# Patient Record
Sex: FEMALE | Race: BLACK OR AFRICAN AMERICAN | Employment: FULL TIME | URBAN - METROPOLITAN AREA
[De-identification: names, ages, dates, MRNs, and addresses within clinical notes are randomized per-mention and may not be internally consistent; named-entity substitution may affect disease eponyms.]

---

## 2018-01-23 ENCOUNTER — OFFICE VISIT (OUTPATIENT)
Dept: FAMILY MEDICINE CLINIC | Age: 33
End: 2018-01-23

## 2018-01-23 VITALS
OXYGEN SATURATION: 99 % | TEMPERATURE: 97.2 F | DIASTOLIC BLOOD PRESSURE: 82 MMHG | BODY MASS INDEX: 32.28 KG/M2 | RESPIRATION RATE: 14 BRPM | HEART RATE: 66 BPM | WEIGHT: 213 LBS | SYSTOLIC BLOOD PRESSURE: 128 MMHG | HEIGHT: 68 IN

## 2018-01-23 DIAGNOSIS — Z00.00 WELL WOMAN EXAM (NO GYNECOLOGICAL EXAM): ICD-10-CM

## 2018-01-23 DIAGNOSIS — Z23 ENCOUNTER FOR IMMUNIZATION: ICD-10-CM

## 2018-01-23 DIAGNOSIS — Z00.00 ROUTINE GENERAL MEDICAL EXAMINATION AT A HEALTH CARE FACILITY: Primary | ICD-10-CM

## 2018-01-23 DIAGNOSIS — E66.9 OBESITY (BMI 30.0-34.9): ICD-10-CM

## 2018-01-23 DIAGNOSIS — R53.82 CHRONIC FATIGUE: ICD-10-CM

## 2018-01-23 PROBLEM — H53.129 CONCENTRIC FADING: Status: ACTIVE | Noted: 2018-01-23

## 2018-01-23 RX ORDER — PHENTERMINE HYDROCHLORIDE 37.5 MG/1
37.5 TABLET ORAL
Qty: 30 TAB | Refills: 0 | Status: SHIPPED | OUTPATIENT
Start: 2018-01-23 | End: 2018-04-11 | Stop reason: SDUPTHER

## 2018-01-23 NOTE — MR AVS SNAPSHOT
[address] [facility] 7 [id number] [phone] Patient: Ruben Abraham MRN: [id number] OND:[id number] Visit Information Date & Time Provider Department Dept. Phone Encounter #  
 1/23/2018  9:00 AM Michelle Buck MD [address] [place] [address] OFFICE-ANNEX [phone] [id number] Follow-up Instructions Return in about 4 weeks (around 2/20/2018), or if symptoms worsen or fail to improve. Upcoming Health Maintenance Date Due  
 PAP AKA CERVICAL CYTOLOGY 4/24/2018 DTaP/Tdap/Td series (2 - Td) 6/2/2026 Allergies as of 1/23/2018  Review Complete On: 1/23/2018 By: Lisa Hernandez LPN No Known Allergies Current Immunizations  Reviewed on 10/20/2016 Name Date Influenza Vaccine (Quad) PF  Incomplete Influenza Vaccine PF 10/9/2013 Pneumococcal Polysaccharide (PPSV-23) 6/2/2016 Tdap 6/2/2016,  Deferred (Patient Refused) Not reviewed this visit You Were Diagnosed With   
  
 Codes Comments Routine general medical examination at a health care facility    -  Primary ICD-10-CM: Z00.00 ICD-9-CM: V70.0 Well woman exam (no gynecological exam)     ICD-10-CM: Z00.00 ICD-9-CM: V70.0 [V70.0] Encounter for immunization     ICD-10-CM: F97 ICD-9-CM: V03.89 Obesity (BMI 30.0-34.9)     ICD-10-CM: Y65.3 ICD-9-CM: 278.00 Chronic fatigue     ICD-10-CM: R53.82 
ICD-9-CM: 780.79 Vitals BP Pulse Temp Resp Height(growth percentile) Weight(growth percentile) 128/82 (BP 1 Location: Left arm, BP Patient Position: At rest) 66 97.2 °F (36.2 °C) (Oral) 14 5' 8\" (1.727 m) 213 lb (96.6 kg) SpO2 BMI OB Status Smoking Status 99% 32.39 kg/m2 Injection Never Smoker BMI and BSA Data Body Mass Index Body Surface Area  
 32.39 kg/m 2 2.15 m 2 Preferred Pharmacy Pharmacy Name Phone [id number] [place], [place] - [address] [facility] Cristina Valverde [phone] Your Updated Medication List  
  
   
This list is accurate as of: 1/23/18 10:34 AM.  Always use your most recent med list.  
  
  
  
  
 phentermine 37.5 mg tablet Commonly known as:  ADIPEX-P Take 1 Tab by mouth every morning. Max Daily Amount: 37.5 mg.  
  
  
  
  
Prescriptions Printed Refills  
 phentermine (ADIPEX-P) 37.5 mg tablet 0 Sig: Take 1 Tab by mouth every morning. Max Daily Amount: 37.5 mg.  
 Class: Print Route: Oral  
  
We Performed the Following CBC W/O DIFF [60009 CPT(R)] INFLUENZA VIRUS VAC QUAD,SPLIT,PRESV FREE SYRINGE IM [id number] CPT(R)] LIPID PANEL [23973 CPT(R)] METABOLIC PANEL, COMPREHENSIVE [79953 CPT(R)] TSH 3RD GENERATION [89984 CPT(R)] Follow-up Instructions Return in about 4 weeks (around 2/20/2018), or if symptoms worsen or fail to improve. Patient Instructions Well Visit, Ages 25 to 48: Care Instructions Your Care Instructions Physical exams can help you stay healthy. Your doctor has checked your overall health and may have suggested ways to take good care of yourself. He or she also may have recommended tests. At home, you can help prevent illness with healthy eating, regular exercise, and other steps. Follow-up care is a key part of your treatment and safety. Be sure to make and go to all appointments, and call your doctor if you are having problems. It's also a good idea to know your test results and keep a list of the medicines you take. How can you care for yourself at home? · Reach and stay at a healthy weight. This will lower your risk for many problems, such as obesity, diabetes, heart disease, and high blood pressure. · Get at least 30 minutes of physical activity on most days of the week. Walking is a good choice. You also may want to do other activities, such as running, swimming, cycling, or playing tennis or team sports.  Discuss any changes in your exercise program with your doctor. · Do not smoke or allow others to smoke around you. If you need help quitting, talk to your doctor about stop-smoking programs and medicines. These can increase your chances of quitting for good. · Talk to your doctor about whether you have any risk factors for sexually transmitted infections (STIs). Having one sex partner (who does not have STIs and does not have sex with anyone else) is a good way to avoid these infections. · Use birth control if you do not want to have children at this time. Talk with your doctor about the choices available and what might be best for you. · Protect your skin from too much sun. When you're outdoors from 10 a.m. to 4 p.m., stay in the shade or cover up with clothing and a hat with a wide brim. Wear sunglasses that block UV rays. Even when it's cloudy, put broad-spectrum sunscreen (SPF 30 or higher) on any exposed skin. · See a dentist one or two times a year for checkups and to have your teeth cleaned. · Wear a seat belt in the car. · Drink alcohol in moderation, if at all. That means no more than 2 drinks a day for men and 1 drink a day for women. Follow your doctor's advice about when to have certain tests. These tests can spot problems early. For everyone · Cholesterol. Have the fat (cholesterol) in your blood tested after age 21. Your doctor will tell you how often to have this done based on your age, family history, or other things that can increase your risk for heart disease. · Blood pressure. Have your blood pressure checked during a routine doctor visit. Your doctor will tell you how often to check your blood pressure based on your age, your blood pressure results, and other factors. · Vision. Talk with your doctor about how often to have a glaucoma test. 
· Diabetes. Ask your doctor whether you should have tests for diabetes. · Colon cancer. Have a test for colon cancer at age 48.  You may have one of several tests. If you are younger than 48, you may need a test earlier if you have any risk factors. Risk factors include whether you already had a precancerous polyp removed from your colon or whether your parent, brother, sister, or child has had colon cancer. For women · Breast exam and mammogram. Talk to your doctor about when you should have a clinical breast exam and a mammogram. Medical experts differ on whether and how often women under 50 should have these tests. Your doctor can help you decide what is right for you. · Pap test and pelvic exam. Begin Pap tests at age 24. A Pap test is the best way to find cervical cancer. The test often is part of a pelvic exam. Ask how often to have this test. 
· Tests for sexually transmitted infections (STIs). Ask whether you should have tests for STIs. You may be at risk if you have sex with more than one person, especially if your partners do not wear condoms. For men · Tests for sexually transmitted infections (STIs). Ask whether you should have tests for STIs. You may be at risk if you have sex with more than one person, especially if you do not wear a condom. · Testicular cancer exam. Ask your doctor whether you should check your testicles regularly. · Prostate exam. Talk to your doctor about whether you should have a blood test (called a PSA test) for prostate cancer. Experts differ on whether and when men should have this test. Some experts suggest it if you are older than 39 and are -American or have a father or brother who got prostate cancer when he was younger than 72. When should you call for help? Watch closely for changes in your health, and be sure to contact your doctor if you have any problems or symptoms that concern you. Where can you learn more? Go to http://caleb-bassam.info/. Enter P072 in the search box to learn more about \"Well Visit, Ages 25 to 48: Care Instructions. \" Current as of: May 12, 2017 Content Version: 11.4 © 2695-0224 [employer]. Care instructions adapted under license by [employer] (which disclaims liability or warranty for this information). If you have questions about a medical condition or this instruction, always ask your healthcare professional. [address] any warranty or liability for your use of this information. Learning About Low-Carbohydrate Diets for Weight Loss What is a low-carbohydrate diet? Low-carb diets avoid foods that are high in carbohydrate. These high-carb foods include pasta, bread, rice, cereal, fruits, and starchy vegetables. Instead, these diets usually have you eat foods that are high in fat and protein. Many people lose weight quickly on a low-carb diet. But the early weight loss is water. People on this diet often gain the weight back after they start eating carbs again. Not all diet plans are safe or work well. A lot of the evidence shows that low-carb diets aren't healthy. That's because these diets often don't include healthy foods like fruits and vegetables. Losing weight safely means balancing protein, fat, and carbs with every meal and snack. And low-carb diets don't always provide the vitamins, minerals, and fiber you need. If you have a serious medical condition, talk to your doctor before you try any diet. These conditions include kidney disease, heart disease, type 2 diabetes, high cholesterol, and high blood pressure. If you are pregnant, it may not be safe for your baby if you are on a low-carb diet. How can you lose weight safely? You might have heard that a diet plan helped another person lose weight. But that doesn't mean that it will work for you. It is very hard to stay on a diet that includes lots of big changes in your eating habits. If you want to get to a healthy weight and stay there, making healthy lifestyle changes will often work better than dieting. These steps can help. · Make a plan for change. Work with your doctor to create a plan that is right for you. · See a dietitian. He or she can show you how to make healthy changes in your eating habits. · Manage stress. If you have a lot of stress in your life, it can be hard to focus on making healthy changes to your daily habits. · Track your food and activity. You are likely to do better at losing weight if you keep track of what you eat and what you do. Follow-up care is a key part of your treatment and safety. Be sure to make and go to all appointments, and call your doctor if you are having problems. It's also a good idea to know your test results and keep a list of the medicines you take. Where can you learn more? Go to http://caleb-bassam.info/. Enter A121 in the search box to learn more about \"Learning About Low-Carbohydrate Diets for Weight Loss. \" Current as of: May 12, 2017 Content Version: 11.4 © 2843-0901 KelBillet. Care instructions adapted under license by [employer]wolf (which disclaims liability or warranty for this information). If you have questions about a medical condition or this instruction, always ask your healthcare professional. [address] any warranty or liability for your use of this information. Body Mass Index: Care Instructions Your Care Instructions Body mass index (BMI) can help you see if your weight is raising your risk for health problems. It uses a formula to compare how much you weigh with how tall you are. · A BMI lower than 18.5 is considered underweight. · A BMI between 18.5 and 24.9 is considered healthy. · A BMI between 25 and 29.9 is considered overweight. A BMI of 30 or higher is considered obese. If your BMI is in the normal range, it means that you have a lower risk for weight-related health problems.  If your BMI is in the overweight or obese range, you may be at increased risk for weight-related health problems, such as high blood pressure, heart disease, stroke, arthritis or joint pain, and diabetes. If your BMI is in the underweight range, you may be at increased risk for health problems such as fatigue, lower protection (immunity) against illness, muscle loss, bone loss, hair loss, and hormone problems. BMI is just one measure of your risk for weight-related health problems. You may be at higher risk for health problems if you are not active, you eat an unhealthy diet, or you drink too much alcohol or use tobacco products. Follow-up care is a key part of your treatment and safety. Be sure to make and go to all appointments, and call your doctor if you are having problems. It's also a good idea to know your test results and keep a list of the medicines you take. How can you care for yourself at home? · Practice healthy eating habits. This includes eating plenty of fruits, vegetables, whole grains, lean protein, and low-fat dairy. · If your doctor recommends it, get more exercise. Walking is a good choice. Bit by bit, increase the amount you walk every day. Try for at least 30 minutes on most days of the week. · Do not smoke. Smoking can increase your risk for health problems. If you need help quitting, talk to your doctor about stop-smoking programs and medicines. These can increase your chances of quitting for good. · Limit alcohol to 2 drinks a day for men and 1 drink a day for women. Too much alcohol can cause health problems. If you have a BMI higher than 25 · Your doctor may do other tests to check your risk for weight-related health problems. This may include measuring the distance around your waist. A waist measurement of more than 40 inches in men or 35 inches in women can increase the risk of weight-related health problems. · Talk with your doctor about steps you can take to stay healthy or improve your health.  You may need to make lifestyle changes to lose weight and stay healthy, such as changing your diet and getting regular exercise. If you have a BMI lower than 18.5 · Your doctor may do other tests to check your risk for health problems. · Talk with your doctor about steps you can take to stay healthy or improve your health. You may need to make lifestyle changes to gain or maintain weight and stay healthy, such as getting more healthy foods in your diet and doing exercises to build muscle. Where can you learn more? Go to http://caleb-bassam.info/. Enter S176 in the search box to learn more about \"Body Mass Index: Care Instructions. \" Current as of: October 13, 2016 Content Version: 11.4 © 8754-2050 [email or web address]. Care instructions adapted under license by [employer] (which disclaims liability or warranty for this information). If you have questions about a medical condition or this instruction, always ask your healthcare professional. [place] [address] any warranty or liability for your use of this information. Learning About Carbohydrates What are carbohydrates? Carbohydrates are an important nutrient you get from food. It's a great source of energy for your body and helps your brain and nervous system work properly. How does your body use carbohydrates? After you eat food with carbs in it, your body digests the carbohydrates and turns them into a kind of sugar that goes into your blood. The blood carries this sugar to the cells in your body. The cells use the sugar to give you energy. Extra sugar is stored in the cells for later use. If it isn't used, it turns into fat. Where do carbohydrates come from? The healthiest carbohydrate choices are breads, cereals, and pastas made with whole grains; brown rice; low-fat dairy products; vegetables; legumes such as peas, lentils, and beans; and fruits.  
Foods made from refined flour, including bread, pasta, doughnuts, cookies, and desserts, also contain carbohydrates. So do sweets such as candy and soda. How can you get the right kind and amount of carbs? Eating too much of anything can lead to weight gain. And that can lead to other health problems. Here are some tips to help you eat the right amount of the right kind of carbs so you have the nutrition and the energy you need: 
· Eat 3 to 8 servings of grains (breads, cereals, rice, pasta) each day. For example, a serving is 1 slice of bread, 1 cup of boxed cereal, or ½ cup of cooked rice, cooked pasta, or cooked cereal. Go to www. choosemyplate.gov to learn how many servings you need. ¨ Buy bread that lists whole wheat (or other whole grains), stone-ground wheat, or cracked wheat as the first ingredient. ¨ Eat brown rice, bulgur, or millet instead of white rice. ¨ Eat pasta and cereals made from whole grain flour instead of refined flour. · Eat several servings a day of fresh fruits and vegetables. These include raspberries, apples, figs, oranges, pears, prunes, broccoli, brussels sprouts, carrots, corn, peas, and beans. And there are lots of other fruits and vegetables to choose from. · Limit the amount of candy, desserts, and soda in your diet. Where can you learn more? Go to http://caleb-bassam.info/. Enter F199 in the search box to learn more about \"Learning About Carbohydrates. \" Current as of: May 12, 2017 Content Version: 11.4 © 2775-0246 [employer] Care instructions adapted under license by SceneShot (which disclaims liability or warranty for this information). If you have questions about a medical condition or this instruction, always ask your healthcare professional. [address] any warranty or liability for your use of this information. Learning About Low-Carbohydrate Diets for Weight Loss What is a low-carbohydrate diet? Low-carb diets avoid foods that are high in carbohydrate. These high-carb foods include pasta, bread, rice, cereal, fruits, and starchy vegetables. Instead, these diets usually have you eat foods that are high in fat and protein. Many people lose weight quickly on a low-carb diet. But the early weight loss is water. People on this diet often gain the weight back after they start eating carbs again. Not all diet plans are safe or work well. A lot of the evidence shows that low-carb diets aren't healthy. That's because these diets often don't include healthy foods like fruits and vegetables. Losing weight safely means balancing protein, fat, and carbs with every meal and snack. And low-carb diets don't always provide the vitamins, minerals, and fiber you need. If you have a serious medical condition, talk to your doctor before you try any diet. These conditions include kidney disease, heart disease, type 2 diabetes, high cholesterol, and high blood pressure. If you are pregnant, it may not be safe for your baby if you are on a low-carb diet. How can you lose weight safely? You might have heard that a diet plan helped another person lose weight. But that doesn't mean that it will work for you. It is very hard to stay on a diet that includes lots of big changes in your eating habits. If you want to get to a healthy weight and stay there, making healthy lifestyle changes will often work better than dieting. These steps can help. · Make a plan for change. Work with your doctor to create a plan that is right for you. · See a dietitian. He or she can show you how to make healthy changes in your eating habits. · Manage stress. If you have a lot of stress in your life, it can be hard to focus on making healthy changes to your daily habits. · Track your food and activity. You are likely to do better at losing weight if you keep track of what you eat and what you do. Follow-up care is a key part of your treatment and safety. Be sure to make and go to all appointments, and call your doctor if you are having problems. It's also a good idea to know your test results and keep a list of the medicines you take. Where can you learn more? Go to http://caleb-bassam.info/. Enter A121 in the search box to learn more about \"Learning About Low-Carbohydrate Diets for Weight Loss. \" Current as of: May 12, 2017 Content Version: 11.4 © 4899-2583 "[employer]". Care instructions adapted under license by [employer] (which disclaims liability or warranty for this information). If you have questions about a medical condition or this instruction, always ask your healthcare professional. [address] any warranty or liability for your use of this information. Introducing [facility]! [employer] Fisher introduces inDplay patient portal. Now you can access parts of your medical record, email your doctor's office, and request medication refills online. 1. In your internet browser, go to [email or web address]. Material [email or web address] Security 2. Click on the First Time User? Click Here link in the Sign In box. You will see the New Member Sign Up page. 3. Enter your inDplay Access Code exactly as it appears below. You will not need to use this code after youve completed the sign-up process. If you do not sign up before the expiration date, you must request a new code. · inDplay Access Code: [email or web address] Expires: 4/23/2018 10:34 AM 
 
4. Enter the last four digits of your Social Security Number (xxxx) and Date of Birth (mm/dd/yyyy) as indicated and click Submit. You will be taken to the next sign-up page. 5. Create a miLibrist ID. This will be your inDplay login ID and cannot be changed, so think of one that is secure and easy to remember. 6. Create a miLibrist password. You can change your password at any time. 7. Enter your Password Reset Question and Answer. This can be used at a later time if you forget your password. 8. Enter your e-mail address. You will receive e-mail notification when new information is available in [address] 9. Click Sign Up. You can now view and download portions of your medical record. 10. Click the Download Summary menu link to download a portable copy of your medical information. If you have questions, please visit the Frequently Asked Questions section of the Keoghs website. Remember, Keoghs is NOT to be used for urgent needs. For medical emergencies, dial 911. Now available from your iPhone and Android! Please provide this summary of care documentation to your next provider. Your primary care clinician is listed as Yolis Mehta. If you have any questions after today's visit, please call [phone].

## 2018-01-23 NOTE — PROGRESS NOTES
Subjective:   34 y.o. female for Well Woman Check. Her gyne and breast care is done elsewhere by her Ob-Gyne physician. Present for CPE, last Complete Physical exam was few years ago    , patient currently is not up todate w/ all vaccination, except for last tetanus vaccine was in   less than 5 years ago. last mammog was n never last pap smear normal and was in 2 years ago   . last colonoscopy was never  No past surgical hx,  last bone dexa scan was never     No family hx of breast cancer     no family hx of colon cancer, unfortunately mother and father with diabetic state parent healthy,++ sexaully active and uses Safe sex, has not been recently physically active,  compliant w/ meds, ++Rf needed for today for her meds. Current Outpatient Prescriptions   Medication Sig Dispense Refill    phentermine (ADIPEX-P) 37.5 mg tablet Take 1 Tab by mouth every morning. Max Daily Amount: 37.5 mg. 30 Tab 0     No Known Allergies  Past Medical History:   Diagnosis Date    ADHD (attention deficit hyperactivity disorder), combined type 2016    Bipolar 2 disorder ([facility] [address] 10/25/2016    Chlamydia infection 2014    Chronic fatigue 2018    Concentric fading 2018    Depressed 2015    Disability examination 2015    Encounter for completion of form with patient 2016    Exposure to sexually transmitted disease (STD) 12/3/2014    Fatigue 2014    Hypertension     During pregnacy    Intractable migraine with aura with status migrainosus 11/10/2016    Obesity (BMI 30.0-34. 9) 2014    Poor concentration 2014     Past Surgical History:   Procedure Laterality Date    HX APPENDECTOMY  13    Laparoscopic Appendectomy    HX  SECTION      HX DILATION AND CURETTAGE       Family History   Problem Relation Age of Onset    Cancer Mother     Neuropathy Mother     Stroke Mother      Social History   Substance Use Topics    Smoking status: Never Smoker    Smokeless tobacco: Never Used    Alcohol use No        Lab Results  Component Value Date/Time   Hemoglobin A1c 6.2 11/05/2014 05:52 PM   Hemoglobin A1c 6.2 01/20/2014 01:21 PM   Glucose 82 10/20/2016 01:25 PM   Glucose (POC) 119 10/20/2016 10:56 AM   Creatinine (POC) 0.9 08/25/2010 11:22 AM   Creatinine 0.85 10/20/2016 01:25 PM      Lab Results  Component Value Date/Time   TSH 3.150 10/25/2016 01:12 AM         Review of Systems    Constitutional: Negative for chills and fever, not obese okay body mass index for his age. HENT: Negative for ear head pain and nosebleeds. Eyes: Negative for blurred vision, pain and discharge. Respiratory: Negative for shortness of breath, wheezing cough sore throat. Cardiovascular: Negative for chest pain and leg swelling, racing heart . Gastrointestinal: Negative for constipation, diarrhea, nausea and vomiting. Genitourinary: Negative for frequency. Musculoskeletal: Negative for joint pain. Skin: Negative for itching, pimples or acne rash. Neurological: Negative for headaches. Psychiatric/Behavioral: Negative for depression has normal interest to do things and not depressed the patient is not nervous/anxious. Specific concerns today: her increased bmi and her wt needs meds. Objective: The patient appears well, alert, oriented x 3, in no distress. Visit Vitals    /82 (BP 1 Location: Left arm, BP Patient Position: At rest)    Pulse 66    Temp 97.2 °F (36.2 °C) (Oral)    Resp 14    Ht 5' 8\" (1.727 m)    Wt 213 lb (96.6 kg)    SpO2 99%    BMI 32.39 kg/m2     ENT normal.  Neck supple. No adenopathy or thyromegaly. JAZMYNE. Lungs are clear, good air entry, no wheezes, rhonchi or rales. S1 and S2 normal, no murmurs, regular rate and rhythm. Abdomen soft without tenderness, guarding, mass or organomegaly. Extremities show no edema, normal peripheral pulses. Neurological is normal, no focal findings.   Breast and Pelvic exams are deferred. Assessment/Plan:   Well Woman  lose weight, increase physical activity, follow low fat diet, follow low salt diet, continue present plan, routine labs ordered, call if any problems  Diagnoses and all orders for this visit:    1. Routine general medical examination at a health care facility  -     CBC W/O DIFF  -     METABOLIC PANEL, COMPREHENSIVE  -     TSH 3RD GENERATION    2. Well woman exam (no gynecological exam)  Comments:  [V70.0]  Orders:  -     CBC W/O DIFF  -     METABOLIC PANEL, COMPREHENSIVE  -     TSH 3RD GENERATION  -     LIPID PANEL  -     phentermine (ADIPEX-P) 37.5 mg tablet; Take 1 Tab by mouth every morning. Max Daily Amount: 37.5 mg.    3. Encounter for immunization  -     Influenza virus vaccine (QUADRIVALENT PF SYRINGE) ([id number])    4. Obesity (BMI 30.0-34.9)  -     CBC W/O DIFF  -     METABOLIC PANEL, COMPREHENSIVE  -     TSH 3RD GENERATION  -     LIPID PANEL  -     phentermine (ADIPEX-P) 37.5 mg tablet; Take 1 Tab by mouth every morning. Max Daily Amount: 37.5 mg.    5. Chronic fatigue  -     CBC W/O DIFF  -     METABOLIC PANEL, COMPREHENSIVE  -     TSH 3RD GENERATION  -     LIPID PANEL  -     phentermine (ADIPEX-P) 37.5 mg tablet; Take 1 Tab by mouth every morning. Max Daily Amount: 37.5 mg.       At this time patient was told to lose weight, so that her body mass index would get into a normal level between 20-25,  increase physical activity, limit alcohol consumption, stop secondhand tobacco exposure    In addition the patient was told to start an active life style modifications, for which includes creating a an interesting delightful to do list,  such as start of a light physical activity with a brisk daily walking 30 minutes most days of the week, most likely to total of 150 minutes per week, then the patient was told to try to avoid fatty fast foods, have a low-fat low-cholesterol diet, include seafood such as adding fatty fish such as [place], [place], [place] to the diet, increase vegetables and fruits, nuts 3-4 times per week and finally have a low-salt and K rich food intake for a good 4-6 months possibly for ever for the best outcome,   All mentioned recommendations, have to be done at least most days of the weeks for the best result,  Routine labs ordered, and the needed abnormal labs will be discussed soon and they can be repeated in 3-6 months. In addition relevant handouts were given to the patient for a better understanding,    patient was told to call if any problems.   Patient acknowledged understanding and     Patient agreed with today's recommendation

## 2018-01-23 NOTE — PATIENT INSTRUCTIONS
Well Visit, Ages 25 to 48: Care Instructions  Your Care Instructions    Physical exams can help you stay healthy. Your doctor has checked your overall health and may have suggested ways to take good care of yourself. He or she also may have recommended tests. At home, you can help prevent illness with healthy eating, regular exercise, and other steps. Follow-up care is a key part of your treatment and safety. Be sure to make and go to all appointments, and call your doctor if you are having problems. It's also a good idea to know your test results and keep a list of the medicines you take. How can you care for yourself at home? · Reach and stay at a healthy weight. This will lower your risk for many problems, such as obesity, diabetes, heart disease, and high blood pressure. · Get at least 30 minutes of physical activity on most days of the week. Walking is a good choice. You also may want to do other activities, such as running, swimming, cycling, or playing tennis or team sports. Discuss any changes in your exercise program with your doctor. · Do not smoke or allow others to smoke around you. If you need help quitting, talk to your doctor about stop-smoking programs and medicines. These can increase your chances of quitting for good. · Talk to your doctor about whether you have any risk factors for sexually transmitted infections (STIs). Having one sex partner (who does not have STIs and does not have sex with anyone else) is a good way to avoid these infections. · Use birth control if you do not want to have children at this time. Talk with your doctor about the choices available and what might be best for you. · Protect your skin from too much sun. When you're outdoors from 10 a.m. to 4 p.m., stay in the shade or cover up with clothing and a hat with a wide brim. Wear sunglasses that block UV rays. Even when it's cloudy, put broad-spectrum sunscreen (SPF 30 or higher) on any exposed skin.   · See a dentist one or two times a year for checkups and to have your teeth cleaned. · Wear a seat belt in the car. · Drink alcohol in moderation, if at all. That means no more than 2 drinks a day for men and 1 drink a day for women. Follow your doctor's advice about when to have certain tests. These tests can spot problems early. For everyone  · Cholesterol. Have the fat (cholesterol) in your blood tested after age 21. Your doctor will tell you how often to have this done based on your age, family history, or other things that can increase your risk for heart disease. · Blood pressure. Have your blood pressure checked during a routine doctor visit. Your doctor will tell you how often to check your blood pressure based on your age, your blood pressure results, and other factors. · Vision. Talk with your doctor about how often to have a glaucoma test.  · Diabetes. Ask your doctor whether you should have tests for diabetes. · Colon cancer. Have a test for colon cancer at age 48. You may have one of several tests. If you are younger than 48, you may need a test earlier if you have any risk factors. Risk factors include whether you already had a precancerous polyp removed from your colon or whether your parent, brother, sister, or child has had colon cancer. For women  · Breast exam and mammogram. Talk to your doctor about when you should have a clinical breast exam and a mammogram. Medical experts differ on whether and how often women under 50 should have these tests. Your doctor can help you decide what is right for you. · Pap test and pelvic exam. Begin Pap tests at age 24. A Pap test is the best way to find cervical cancer. The test often is part of a pelvic exam. Ask how often to have this test.  · Tests for sexually transmitted infections (STIs). Ask whether you should have tests for STIs. You may be at risk if you have sex with more than one person, especially if your partners do not wear condoms.   For men  · Tests for sexually transmitted infections (STIs). Ask whether you should have tests for STIs. You may be at risk if you have sex with more than one person, especially if you do not wear a condom. · Testicular cancer exam. Ask your doctor whether you should check your testicles regularly. · Prostate exam. Talk to your doctor about whether you should have a blood test (called a PSA test) for prostate cancer. Experts differ on whether and when men should have this test. Some experts suggest it if you are older than 39 and are -American or have a father or brother who got prostate cancer when he was younger than 72. When should you call for help? Watch closely for changes in your health, and be sure to contact your doctor if you have any problems or symptoms that concern you. Where can you learn more? Go to http://caleb-bassam.info/. Enter P072 in the search box to learn more about \"Well Visit, Ages 25 to 48: Care Instructions. \"  Current as of: May 12, 2017  Content Version: 11.4  © 2513-5758 [employer]. Care instructions adapted under license by [employer] (which disclaims liability or warranty for this information). If you have questions about a medical condition or this instruction, always ask your healthcare professional. [address] any warranty or liability for your use of this information. Learning About Low-Carbohydrate Diets for Weight Loss  What is a low-carbohydrate diet? Low-carb diets avoid foods that are high in carbohydrate. These high-carb foods include pasta, bread, rice, cereal, fruits, and starchy vegetables. Instead, these diets usually have you eat foods that are high in fat and protein. Many people lose weight quickly on a low-carb diet. But the early weight loss is water. People on this diet often gain the weight back after they start eating carbs again. Not all diet plans are safe or work well.  A lot of the evidence shows that low-carb diets aren't healthy. That's because these diets often don't include healthy foods like fruits and vegetables. Losing weight safely means balancing protein, fat, and carbs with every meal and snack. And low-carb diets don't always provide the vitamins, minerals, and fiber you need. If you have a serious medical condition, talk to your doctor before you try any diet. These conditions include kidney disease, heart disease, type 2 diabetes, high cholesterol, and high blood pressure. If you are pregnant, it may not be safe for your baby if you are on a low-carb diet. How can you lose weight safely? You might have heard that a diet plan helped another person lose weight. But that doesn't mean that it will work for you. It is very hard to stay on a diet that includes lots of big changes in your eating habits. If you want to get to a healthy weight and stay there, making healthy lifestyle changes will often work better than dieting. These steps can help. · Make a plan for change. Work with your doctor to create a plan that is right for you. · See a dietitian. He or she can show you how to make healthy changes in your eating habits. · Manage stress. If you have a lot of stress in your life, it can be hard to focus on making healthy changes to your daily habits. · Track your food and activity. You are likely to do better at losing weight if you keep track of what you eat and what you do. Follow-up care is a key part of your treatment and safety. Be sure to make and go to all appointments, and call your doctor if you are having problems. It's also a good idea to know your test results and keep a list of the medicines you take. Where can you learn more? Go to http://caleb-bassam.info/. Enter A121 in the search box to learn more about \"Learning About Low-Carbohydrate Diets for Weight Loss. \"  Current as of:  May 12, 2017  Content Version: 11.4  © 3881-1844 [employer]. Care instructions adapted under license by [employer] (which disclaims liability or warranty for this information). If you have questions about a medical condition or this instruction, always ask your healthcare professional. [address] any warranty or liability for your use of this information. Body Mass Index: Care Instructions  Your Care Instructions    Body mass index (BMI) can help you see if your weight is raising your risk for health problems. It uses a formula to compare how much you weigh with how tall you are. · A BMI lower than 18.5 is considered underweight. · A BMI between 18.5 and 24.9 is considered healthy. · A BMI between 25 and 29.9 is considered overweight. A BMI of 30 or higher is considered obese. If your BMI is in the normal range, it means that you have a lower risk for weight-related health problems. If your BMI is in the overweight or obese range, you may be at increased risk for weight-related health problems, such as high blood pressure, heart disease, stroke, arthritis or joint pain, and diabetes. If your BMI is in the underweight range, you may be at increased risk for health problems such as fatigue, lower protection (immunity) against illness, muscle loss, bone loss, hair loss, and hormone problems. BMI is just one measure of your risk for weight-related health problems. You may be at higher risk for health problems if you are not active, you eat an unhealthy diet, or you drink too much alcohol or use tobacco products. Follow-up care is a key part of your treatment and safety. Be sure to make and go to all appointments, and call your doctor if you are having problems. It's also a good idea to know your test results and keep a list of the medicines you take. How can you care for yourself at home? · Practice healthy eating habits.  This includes eating plenty of fruits, vegetables, whole grains, lean protein, and low-fat dairy. · If your doctor recommends it, get more exercise. Walking is a good choice. Bit by bit, increase the amount you walk every day. Try for at least 30 minutes on most days of the week. · Do not smoke. Smoking can increase your risk for health problems. If you need help quitting, talk to your doctor about stop-smoking programs and medicines. These can increase your chances of quitting for good. · Limit alcohol to 2 drinks a day for men and 1 drink a day for women. Too much alcohol can cause health problems. If you have a BMI higher than 25  · Your doctor may do other tests to check your risk for weight-related health problems. This may include measuring the distance around your waist. A waist measurement of more than 40 inches in men or 35 inches in women can increase the risk of weight-related health problems. · Talk with your doctor about steps you can take to stay healthy or improve your health. You may need to make lifestyle changes to lose weight and stay healthy, such as changing your diet and getting regular exercise. If you have a BMI lower than 18.5  · Your doctor may do other tests to check your risk for health problems. · Talk with your doctor about steps you can take to stay healthy or improve your health. You may need to make lifestyle changes to gain or maintain weight and stay healthy, such as getting more healthy foods in your diet and doing exercises to build muscle. Where can you learn more? Go to http://caleb-bassam.info/. Enter S176 in the search box to learn more about \"Body Mass Index: Care Instructions. \"  Current as of: October 13, 2016  Content Version: 11.4  © 5108-4558 Healthwise, Incorporated. Care instructions adapted under license by [employer] (which disclaims liability or warranty for this information).  If you have questions about a medical condition or this instruction, always ask your healthcare professional. Wanda Dennis, [facility] disclaims any warranty or liability for your use of this information. Learning About Carbohydrates  What are carbohydrates? Carbohydrates are an important nutrient you get from food. It's a great source of energy for your body and helps your brain and nervous system work properly. How does your body use carbohydrates? After you eat food with carbs in it, your body digests the carbohydrates and turns them into a kind of sugar that goes into your blood. The blood carries this sugar to the cells in your body. The cells use the sugar to give you energy. Extra sugar is stored in the cells for later use. If it isn't used, it turns into fat. Where do carbohydrates come from? The healthiest carbohydrate choices are breads, cereals, and pastas made with whole grains; brown rice; low-fat dairy products; vegetables; legumes such as peas, lentils, and beans; and fruits. Foods made from refined flour, including bread, pasta, doughnuts, cookies, and desserts, also contain carbohydrates. So do sweets such as candy and soda. How can you get the right kind and amount of carbs? Eating too much of anything can lead to weight gain. And that can lead to other health problems. Here are some tips to help you eat the right amount of the right kind of carbs so you have the nutrition and the energy you need:  · Eat 3 to 8 servings of grains (breads, cereals, rice, pasta) each day. For example, a serving is 1 slice of bread, 1 cup of boxed cereal, or ½ cup of cooked rice, cooked pasta, or cooked cereal. Go to www. choosemyplate.gov to learn how many servings you need. ¨ Buy bread that lists whole wheat (or other whole grains), stone-ground wheat, or cracked wheat as the first ingredient. ¨ Eat brown rice, bulgur, or millet instead of white rice. ¨ Eat pasta and cereals made from whole grain flour instead of refined flour. · Eat several servings a day of fresh fruits and vegetables.  These include raspberries, apples, figs, oranges, pears, prunes, broccoli, brussels sprouts, carrots, corn, peas, and beans. And there are lots of other fruits and vegetables to choose from. · Limit the amount of candy, desserts, and soda in your diet. Where can you learn more? Go to http://caleb-bassam.info/. Enter F199 in the search box to learn more about \"Learning About Carbohydrates. \"  Current as of: May 12, 2017  Content Version: 11.4  © 4712-2398 [employer]. Care instructions adapted under license by [employer] (which disclaims liability or warranty for this information). If you have questions about a medical condition or this instruction, always ask your healthcare professional. [address] any warranty or liability for your use of this information. Learning About Low-Carbohydrate Diets for Weight Loss  What is a low-carbohydrate diet? Low-carb diets avoid foods that are high in carbohydrate. These high-carb foods include pasta, bread, rice, cereal, fruits, and starchy vegetables. Instead, these diets usually have you eat foods that are high in fat and protein. Many people lose weight quickly on a low-carb diet. But the early weight loss is water. People on this diet often gain the weight back after they start eating carbs again. Not all diet plans are safe or work well. A lot of the evidence shows that low-carb diets aren't healthy. That's because these diets often don't include healthy foods like fruits and vegetables. Losing weight safely means balancing protein, fat, and carbs with every meal and snack. And low-carb diets don't always provide the vitamins, minerals, and fiber you need. If you have a serious medical condition, talk to your doctor before you try any diet. These conditions include kidney disease, heart disease, type 2 diabetes, high cholesterol, and high blood pressure.   If you are pregnant, it may not be safe for your baby if you are on a low-carb diet. How can you lose weight safely? You might have heard that a diet plan helped another person lose weight. But that doesn't mean that it will work for you. It is very hard to stay on a diet that includes lots of big changes in your eating habits. If you want to get to a healthy weight and stay there, making healthy lifestyle changes will often work better than dieting. These steps can help. · Make a plan for change. Work with your doctor to create a plan that is right for you. · See a dietitian. He or she can show you how to make healthy changes in your eating habits. · Manage stress. If you have a lot of stress in your life, it can be hard to focus on making healthy changes to your daily habits. · Track your food and activity. You are likely to do better at losing weight if you keep track of what you eat and what you do. Follow-up care is a key part of your treatment and safety. Be sure to make and go to all appointments, and call your doctor if you are having problems. It's also a good idea to know your test results and keep a list of the medicines you take. Where can you learn more? Go to http://caleb-bassam.info/. Enter A121 in the search box to learn more about \"Learning About Low-Carbohydrate Diets for Weight Loss. \"  Current as of: May 12, 2017  Content Version: 11.4  © 1949-4848 Healthwise, Incorporated. Care instructions adapted under license by [employer] (which disclaims liability or warranty for this information). If you have questions about a medical condition or this instruction, always ask your healthcare professional. [address] any warranty or liability for your use of this information.

## 2018-01-24 LAB
ALBUMIN SERPL-MCNC: 4 G/DL (ref 3.5–5.5)
ALBUMIN/GLOB SERPL: 1.4 {RATIO} (ref 1.2–2.2)
ALP SERPL-CCNC: 56 IU/L (ref 39–117)
ALT SERPL-CCNC: 17 IU/L (ref 0–32)
AST SERPL-CCNC: 25 IU/L (ref 0–40)
BILIRUB SERPL-MCNC: 0.2 MG/DL (ref 0–1.2)
BUN SERPL-MCNC: 12 MG/DL (ref 6–20)
BUN/CREAT SERPL: 15 (ref 9–23)
CALCIUM SERPL-MCNC: 9.2 MG/DL (ref 8.7–10.2)
CHLORIDE SERPL-SCNC: 102 MMOL/L (ref 96–106)
CHOLEST SERPL-MCNC: 169 MG/DL (ref 100–199)
CO2 SERPL-SCNC: 25 MMOL/L (ref 18–29)
CREAT SERPL-MCNC: 0.8 MG/DL (ref 0.57–1)
ERYTHROCYTE [DISTWIDTH] IN BLOOD BY AUTOMATED COUNT: 15.3 % (ref 12.3–15.4)
GLOBULIN SER CALC-MCNC: 2.8 G/DL (ref 1.5–4.5)
GLUCOSE SERPL-MCNC: 109 MG/DL (ref 65–99)
HCT VFR BLD AUTO: 35.5 % (ref 34–46.6)
HDLC SERPL-MCNC: 33 MG/DL
HGB BLD-MCNC: 11.5 G/DL (ref 11.1–15.9)
LDLC SERPL CALC-MCNC: 102 MG/DL (ref 0–99)
MCH RBC QN AUTO: 26.7 PG (ref 26.6–33)
MCHC RBC AUTO-ENTMCNC: 32.4 G/DL (ref 31.5–35.7)
MCV RBC AUTO: 83 FL (ref 79–97)
PLATELET # BLD AUTO: 341 X10E3/UL (ref 150–379)
POTASSIUM SERPL-SCNC: 4.7 MMOL/L (ref 3.5–5.2)
PROT SERPL-MCNC: 6.8 G/DL (ref 6–8.5)
RBC # BLD AUTO: 4.3 X10E6/UL (ref 3.77–5.28)
SODIUM SERPL-SCNC: 141 MMOL/L (ref 134–144)
TRIGL SERPL-MCNC: 168 MG/DL (ref 0–149)
TSH SERPL DL<=0.005 MIU/L-ACNC: 2.92 UIU/ML (ref 0.45–4.5)
VLDLC SERPL CALC-MCNC: 34 MG/DL (ref 5–40)
WBC # BLD AUTO: 5.9 X10E3/UL (ref 3.4–10.8)

## 2018-04-11 ENCOUNTER — OFFICE VISIT (OUTPATIENT)
Dept: FAMILY MEDICINE CLINIC | Age: 33
End: 2018-04-11

## 2018-04-11 VITALS
OXYGEN SATURATION: 100 % | DIASTOLIC BLOOD PRESSURE: 87 MMHG | HEART RATE: 78 BPM | WEIGHT: 208.8 LBS | BODY MASS INDEX: 31.64 KG/M2 | TEMPERATURE: 98.7 F | HEIGHT: 68 IN | RESPIRATION RATE: 18 BRPM | SYSTOLIC BLOOD PRESSURE: 145 MMHG

## 2018-04-11 DIAGNOSIS — E66.9 OBESITY (BMI 30.0-34.9): Primary | ICD-10-CM

## 2018-04-11 DIAGNOSIS — R53.82 CHRONIC FATIGUE: ICD-10-CM

## 2018-04-11 DIAGNOSIS — R41.840 POOR CONCENTRATION: ICD-10-CM

## 2018-04-11 RX ORDER — PHENTERMINE HYDROCHLORIDE 37.5 MG/1
37.5 TABLET ORAL
Qty: 30 TAB | Refills: 0 | Status: SHIPPED | OUTPATIENT
Start: 2018-04-11 | End: 2019-01-18

## 2018-04-11 RX ORDER — PHENTERMINE HYDROCHLORIDE 37.5 MG/1
37.5 TABLET ORAL
Qty: 30 TAB | Refills: 0 | Status: SHIPPED | OUTPATIENT
Start: 2018-06-11 | End: 2019-01-18

## 2018-04-11 RX ORDER — PHENTERMINE HYDROCHLORIDE 37.5 MG/1
37.5 TABLET ORAL
Qty: 30 TAB | Refills: 0 | Status: SHIPPED | OUTPATIENT
Start: 2018-05-11 | End: 2019-01-18

## 2018-04-11 NOTE — MR AVS SNAPSHOT
[address] [facility] 7 [id number] [phone] Patient: Femi Beach MRN: [id number] Visit Information Date & Time Provider Department Dept. Phone Encounter #  
 4/11/2018  2:45 PM Jovon Steinberg MD [address] [place] [address] OFFICE-ANNEX [phone] [id number] Follow-up Instructions Return in about 3 months (around 7/11/2018), or if symptoms worsen or fail to improve. Upcoming Health Maintenance Date Due  
 PAP AKA CERVICAL CYTOLOGY 4/24/2018 DTaP/Tdap/Td series (2 - Td) 6/2/2026 Allergies as of 4/11/2018  Review Complete On: 4/11/2018 By: Jovon Steinberg MD  
 No Known Allergies Current Immunizations  Reviewed on 1/30/2018 Name Date Influenza Vaccine (Quad) PF  Deferred (Patient Refused) Influenza Vaccine PF 10/9/2013 Pneumococcal Polysaccharide (PPSV-23) 6/2/2016 Tdap 6/2/2016,  Deferred (Patient Refused) Not reviewed this visit You Were Diagnosed With   
  
 Codes Comments Obesity (BMI 30.0-34.9)    -  Primary ICD-10-CM: X78.1 ICD-9-CM: 278.00 Chronic fatigue     ICD-10-CM: R53.82 
ICD-9-CM: 780.79 Poor concentration     ICD-10-CM: R41.840 ICD-9-CM: 799.51 Vitals BP Pulse Temp Resp Height(growth percentile) Weight(growth percentile) 145/87 (BP 1 Location: Right arm, BP Patient Position: Sitting) 78 98.7 °F (37.1 °C) (Oral) 18 5' 8\" (1.727 m) 208 lb 12.8 oz (94.7 kg) SpO2 BMI OB Status Smoking Status 100% 31.75 kg/m2 Injection Never Smoker BMI and BSA Data Body Mass Index Body Surface Area 31.75 kg/m 2 2.13 m 2 Preferred Pharmacy Pharmacy Name Phone [id number] [address] [phone] Your Updated Medication List  
  
   
This list is accurate as of 4/11/18  3:54 PM.  Always use your most recent med list.  
  
  
  
 * phentermine 37.5 mg tablet Commonly known as:  ADIPEX-P Take 1 Tab by mouth every morning. Max Daily Amount: 37.5 mg.  
  
 * phentermine 37.5 mg tablet Commonly known as:  ADIPEX-P Take 1 Tab by mouth every morning. Max Daily Amount: 37.5 mg.  
Start taking on:  5/11/2018 * phentermine 37.5 mg tablet Commonly known as:  ADIPEX-P Take 1 Tab by mouth every morning. Max Daily Amount: 37.5 mg.  
Start taking on:  6/11/2018 * Notice: This list has 3 medication(s) that are the same as other medications prescribed for you. Read the directions carefully, and ask your doctor or other care provider to review them with you. Prescriptions Printed Refills  
 phentermine (ADIPEX-P) 37.5 mg tablet 0 Starting on: 6/11/2018 Sig: Take 1 Tab by mouth every morning. Max Daily Amount: 37.5 mg.  
 Class: Print Route: Oral  
 phentermine (ADIPEX-P) 37.5 mg tablet 0 Starting on: 5/11/2018 Sig: Take 1 Tab by mouth every morning. Max Daily Amount: 37.5 mg.  
 Class: Print Route: Oral  
 phentermine (ADIPEX-P) 37.5 mg tablet 0 Sig: Take 1 Tab by mouth every morning. Max Daily Amount: 37.5 mg.  
 Class: Print Route: Oral  
  
Follow-up Instructions Return in about 3 months (around 7/11/2018), or if symptoms worsen or fail to improve. Patient Instructions Learning About Low-Carbohydrate Diets for Weight Loss What is a low-carbohydrate diet? Low-carb diets avoid foods that are high in carbohydrate. These high-carb foods include pasta, bread, rice, cereal, fruits, and starchy vegetables. Instead, these diets usually have you eat foods that are high in fat and protein. Many people lose weight quickly on a low-carb diet. But the early weight loss is water. People on this diet often gain the weight back after they start eating carbs again. Not all diet plans are safe or work well.  A lot of the evidence shows that low-carb diets aren't healthy. That's because these diets often don't include healthy foods like fruits and vegetables. Losing weight safely means balancing protein, fat, and carbs with every meal and snack. And low-carb diets don't always provide the vitamins, minerals, and fiber you need. If you have a serious medical condition, talk to your doctor before you try any diet. These conditions include kidney disease, heart disease, type 2 diabetes, high cholesterol, and high blood pressure. If you are pregnant, it may not be safe for your baby if you are on a low-carb diet. How can you lose weight safely? You might have heard that a diet plan helped another person lose weight. But that doesn't mean that it will work for you. It is very hard to stay on a diet that includes lots of big changes in your eating habits. If you want to get to a healthy weight and stay there, making healthy lifestyle changes will often work better than dieting. These steps can help. · Make a plan for change. Work with your doctor to create a plan that is right for you. · See a dietitian. He or she can show you how to make healthy changes in your eating habits. · Manage stress. If you have a lot of stress in your life, it can be hard to focus on making healthy changes to your daily habits. · Track your food and activity. You are likely to do better at losing weight if you keep track of what you eat and what you do. Follow-up care is a key part of your treatment and safety. Be sure to make and go to all appointments, and call your doctor if you are having problems. It's also a good idea to know your test results and keep a list of the medicines you take. Where can you learn more? Go to http://caleb-bassam.info/. Enter A121 in the search box to learn more about \"Learning About Low-Carbohydrate Diets for Weight Loss. \" Current as of: May 12, 2017 Content Version: 11.4 © 5636-6109 Healthwise, Incorporated. Care instructions adapted under license by [employer] (which disclaims liability or warranty for this information). If you have questions about a medical condition or this instruction, always ask your healthcare professional. [address] any warranty or liability for your use of this information. Learning About Carbohydrates What are carbohydrates? Carbohydrates are an important nutrient you get from food. It's a great source of energy for your body and helps your brain and nervous system work properly. How does your body use carbohydrates? After you eat food with carbs in it, your body digests the carbohydrates and turns them into a kind of sugar that goes into your blood. The blood carries this sugar to the cells in your body. The cells use the sugar to give you energy. Extra sugar is stored in the cells for later use. If it isn't used, it turns into fat. Where do carbohydrates come from? The healthiest carbohydrate choices are breads, cereals, and pastas made with whole grains; brown rice; low-fat dairy products; vegetables; legumes such as peas, lentils, and beans; and fruits. Foods made from refined flour, including bread, pasta, doughnuts, cookies, and desserts, also contain carbohydrates. So do sweets such as candy and soda. How can you get the right kind and amount of carbs? Eating too much of anything can lead to weight gain. And that can lead to other health problems. Here are some tips to help you eat the right amount of the right kind of carbs so you have the nutrition and the energy you need: 
· Eat 3 to 8 servings of grains (breads, cereals, rice, pasta) each day. For example, a serving is 1 slice of bread, 1 cup of boxed cereal, or ½ cup of cooked rice, cooked pasta, or cooked cereal. Go to www. choosemyplate.gov to learn how many servings you need. ¨ Buy bread that lists whole wheat (or other whole grains), stone-ground wheat, or cracked wheat as the first ingredient. ¨ Eat brown rice, bulgur, or millet instead of white rice. ¨ Eat pasta and cereals made from whole grain flour instead of refined flour. · Eat several servings a day of fresh fruits and vegetables. These include raspberries, apples, figs, oranges, pears, prunes, broccoli, brussels sprouts, carrots, corn, peas, and beans. And there are lots of other fruits and vegetables to choose from. · Limit the amount of candy, desserts, and soda in your diet. Where can you learn more? Go to http://caleb-bassam.info/. Enter F199 in the search box to learn more about \"Learning About Carbohydrates. \" Current as of: May 12, 2017 Content Version: 11.4 © 8522-9290 [employer]. Care instructions adapted under license by [employer] (which disclaims liability or warranty for this information). If you have questions about a medical condition or this instruction, always ask your healthcare professional. [place] [address] any warranty or liability for your use of this information. Introducing [facility]! Pavithra Simms introduces [employer] patient portal. Now you can access parts of your medical record, email your doctor's office, and request medication refills online. 1. In your internet browser, go to [email or web address]t 2. Click on the First Time User? Click Here link in the Sign In box. You will see the New Member Sign Up page. 3. Enter your [employer] Access Code exactly as it appears below. You will not need to use this code after youve completed the sign-up process. If you do not sign up before the expiration date, you must request a new code. · [employer] Access Code: [email or web address] Expires: 4/23/2018 11:34 AM 
 
4.  Enter the last four digits of your Social Security Number (xxxx) and Date of Birth (mm/dd/yyyy) as indicated and click Submit. You will be taken to the next sign-up page. 5. Create a AnonymAsk ID. This will be your AnonymAsk login ID and cannot be changed, so think of one that is secure and easy to remember. 6. Create a AnonymAsk password. You can change your password at any time. 7. Enter your Password Reset Question and Answer. This can be used at a later time if you forget your password. 8. Enter your e-mail address. You will receive e-mail notification when new information is available in [address] 9. Click Sign Up. You can now view and download portions of your medical record. 10. Click the Download Summary menu link to download a portable copy of your medical information. If you have questions, please visit the Frequently Asked Questions section of the AnonymAsk website. Remember, AnonymAsk is NOT to be used for urgent needs. For medical emergencies, dial 911. Now available from your iPhone and Android! Please provide this summary of care documentation to your next provider. Your primary care clinician is listed as Godfrey Cornell. If you have any questions after today's visit, please call [phone].

## 2018-04-11 NOTE — PATIENT INSTRUCTIONS
Learning About Low-Carbohydrate Diets for Weight Loss  What is a low-carbohydrate diet? Low-carb diets avoid foods that are high in carbohydrate. These high-carb foods include pasta, bread, rice, cereal, fruits, and starchy vegetables. Instead, these diets usually have you eat foods that are high in fat and protein. Many people lose weight quickly on a low-carb diet. But the early weight loss is water. People on this diet often gain the weight back after they start eating carbs again. Not all diet plans are safe or work well. A lot of the evidence shows that low-carb diets aren't healthy. That's because these diets often don't include healthy foods like fruits and vegetables. Losing weight safely means balancing protein, fat, and carbs with every meal and snack. And low-carb diets don't always provide the vitamins, minerals, and fiber you need. If you have a serious medical condition, talk to your doctor before you try any diet. These conditions include kidney disease, heart disease, type 2 diabetes, high cholesterol, and high blood pressure. If you are pregnant, it may not be safe for your baby if you are on a low-carb diet. How can you lose weight safely? You might have heard that a diet plan helped another person lose weight. But that doesn't mean that it will work for you. It is very hard to stay on a diet that includes lots of big changes in your eating habits. If you want to get to a healthy weight and stay there, making healthy lifestyle changes will often work better than dieting. These steps can help. · Make a plan for change. Work with your doctor to create a plan that is right for you. · See a dietitian. He or she can show you how to make healthy changes in your eating habits. · Manage stress. If you have a lot of stress in your life, it can be hard to focus on making healthy changes to your daily habits. · Track your food and activity.  You are likely to do better at losing weight if you keep track of what you eat and what you do. Follow-up care is a key part of your treatment and safety. Be sure to make and go to all appointments, and call your doctor if you are having problems. It's also a good idea to know your test results and keep a list of the medicines you take. Where can you learn more? Go to http://caleb-bassam.info/. Enter A121 in the search box to learn more about \"Learning About Low-Carbohydrate Diets for Weight Loss. \"  Current as of: May 12, 2017  Content Version: 11.4  © 4811-0356 [employer]. Care instructions adapted under license by [employer] (which disclaims liability or warranty for this information). If you have questions about a medical condition or this instruction, always ask your healthcare professional. [address] any warranty or liability for your use of this information. Learning About Carbohydrates  What are carbohydrates? Carbohydrates are an important nutrient you get from food. It's a great source of energy for your body and helps your brain and nervous system work properly. How does your body use carbohydrates? After you eat food with carbs in it, your body digests the carbohydrates and turns them into a kind of sugar that goes into your blood. The blood carries this sugar to the cells in your body. The cells use the sugar to give you energy. Extra sugar is stored in the cells for later use. If it isn't used, it turns into fat. Where do carbohydrates come from? The healthiest carbohydrate choices are breads, cereals, and pastas made with whole grains; brown rice; low-fat dairy products; vegetables; legumes such as peas, lentils, and beans; and fruits. Foods made from refined flour, including bread, pasta, doughnuts, cookies, and desserts, also contain carbohydrates. So do sweets such as candy and soda. How can you get the right kind and amount of carbs?   Eating too much of anything can lead to weight gain. And that can lead to other health problems. Here are some tips to help you eat the right amount of the right kind of carbs so you have the nutrition and the energy you need:  · Eat 3 to 8 servings of grains (breads, cereals, rice, pasta) each day. For example, a serving is 1 slice of bread, 1 cup of boxed cereal, or ½ cup of cooked rice, cooked pasta, or cooked cereal. Go to www. choosemyplate.gov to learn how many servings you need. ¨ Buy bread that lists whole wheat (or other whole grains), stone-ground wheat, or cracked wheat as the first ingredient. ¨ Eat brown rice, bulgur, or millet instead of white rice. ¨ Eat pasta and cereals made from whole grain flour instead of refined flour. · Eat several servings a day of fresh fruits and vegetables. These include raspberries, apples, figs, oranges, pears, prunes, broccoli, brussels sprouts, carrots, corn, peas, and beans. And there are lots of other fruits and vegetables to choose from. · Limit the amount of candy, desserts, and soda in your diet. Where can you learn more? Go to http://caleb-bassam.info/. Enter F199 in the search box to learn more about \"Learning About Carbohydrates. \"  Current as of: May 12, 2017  Content Version: 11.4  © 0824-5624 [employer]. Care instructions adapted under license by [employer] (which disclaims liability or warranty for this information). If you have questions about a medical condition or this instruction, always ask your healthcare professional. [place] [address] any warranty or liability for your use of this information.

## 2018-04-11 NOTE — PROGRESS NOTES
HISTORY OF PRESENT ILLNESS  Maranda Johnson is a 34 y.o. female. HPI     Obesity  The pt is feeling very good on this meds, feeling less tired and fatigued, becoming to be more concentrated at work and at home, getting along very well with family member and the work place, in addition the pt is becoming to be more active and having daily multiple healthy different diets,  Is more involved with the weekly routine exercises, not a fast fooder, states that the pt does not eat too much as used to do and the portion are very well controlled, likes very much to continue on this medication despite knowing that it is not a safe meds, and  needs to be monitored q3 months and if any thing of unusual, the pt was told if any needs to call us and let us know of any  concern regarding the current meds, hopefully has not had any concern so far,       Current Outpatient Prescriptions   Medication Sig Dispense Refill    [START ON 6/11/2018] phentermine (ADIPEX-P) 37.5 mg tablet Take 1 Tab by mouth every morning. Max Daily Amount: 37.5 mg. 30 Tab 0    [START ON 5/11/2018] phentermine (ADIPEX-P) 37.5 mg tablet Take 1 Tab by mouth every morning. Max Daily Amount: 37.5 mg. 30 Tab 0    phentermine (ADIPEX-P) 37.5 mg tablet Take 1 Tab by mouth every morning. Max Daily Amount: 37.5 mg. 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 9) 8/13/2014    Poor concentration 2014     Past Surgical History:   Procedure Laterality Date    HX APPENDECTOMY  13    Laparoscopic Appendectomy    HX  SECTION      HX DILATION AND CURETTAGE       Family History   Problem Relation Age of Onset    Cancer Mother     Neuropathy Mother     Stroke Mother      Social History   Substance Use Topics    Smoking status: Never Smoker    Smokeless tobacco: Never Used    Alcohol use No      Lab Results  Component Value Date/Time   WBC 5.9 2018 10:51 AM   HGB 11.5 2018 10:51 AM   Hemoglobin (POC) 14.3 2010 11:22 AM   HCT 35.5 2018 10:51 AM   Hematocrit (POC) 42 2010 11:22 AM   PLATELET 073  10:51 AM   MCV 83 2018 10:51 AM     Lab Results  Component Value Date/Time   Hemoglobin A1c 6.2 (H) 2014 05:52 PM   Hemoglobin A1c 6.2 (H) 2014 01:21 PM   Glucose 109 (H) 2018 10:51 AM   Glucose (POC) 119 (H) 10/20/2016 10:56 AM   LDL, calculated 102 (H) 2018 10:51 AM   Creatinine (POC) 0.9 2010 11:22 AM   Creatinine 0.80 2018 10:51 AM         Review of Systems   Constitutional: Positive for malaise/fatigue. Negative for chills and fever. HENT: Negative for congestion and nosebleeds. Eyes: Negative for blurred vision and pain. Respiratory: Negative for shortness of breath and wheezing. Cardiovascular: Negative for chest pain, palpitations and leg swelling. Gastrointestinal: Negative for constipation, diarrhea, nausea and vomiting. Genitourinary: Negative for dysuria and frequency. Musculoskeletal: Negative for joint pain and myalgias. Skin: Negative for itching and rash. Neurological: Positive for weakness. Negative for dizziness, loss of consciousness and headaches. Endo/Heme/Allergies: Does not bruise/bleed easily. Psychiatric/Behavioral: Positive for memory loss. Negative for depression. The patient is not nervous/anxious and does not have insomnia.     All other systems reviewed and are negative. Physical Exam   Constitutional: She is oriented to person, place, and time. She appears well-developed and well-nourished. HENT:   Head: Normocephalic and atraumatic. Eyes: Conjunctivae and EOM are normal. Pupils are equal, round, and reactive to light. Neck: No JVD present. No thyromegaly present. Cardiovascular: Normal rate, regular rhythm, normal heart sounds and intact distal pulses. Exam reveals no gallop and no friction rub. No murmur heard. Pulmonary/Chest: Effort normal and breath sounds normal. No stridor. No respiratory distress. She has no wheezes. She has no rales. Abdominal: Soft. Bowel sounds are normal. She exhibits no distension and no mass. There is no tenderness. Musculoskeletal: Normal range of motion. She exhibits no edema or tenderness. Lymphadenopathy:     She has no cervical adenopathy. Neurological: She is alert and oriented to person, place, and time. She has normal reflexes. No cranial nerve deficit. Skin: No rash noted. No erythema. Psychiatric: She has a normal mood and affect. Her behavior is normal.   Nursing note and vitals reviewed. ASSESSMENT and PLAN  Diagnoses and all orders for this visit:    1. Obesity (BMI 30.0-34.9)  -     phentermine (ADIPEX-P) 37.5 mg tablet; Take 1 Tab by mouth every morning. Max Daily Amount: 37.5 mg.  -     phentermine (ADIPEX-P) 37.5 mg tablet; Take 1 Tab by mouth every morning. Max Daily Amount: 37.5 mg.  -     phentermine (ADIPEX-P) 37.5 mg tablet; Take 1 Tab by mouth every morning. Max Daily Amount: 37.5 mg.    2. Chronic fatigue  -     phentermine (ADIPEX-P) 37.5 mg tablet; Take 1 Tab by mouth every morning. Max Daily Amount: 37.5 mg.  -     phentermine (ADIPEX-P) 37.5 mg tablet; Take 1 Tab by mouth every morning. Max Daily Amount: 37.5 mg.  -     phentermine (ADIPEX-P) 37.5 mg tablet; Take 1 Tab by mouth every morning.  Max Daily Amount: 37.5 mg.    3. Poor concentration      At this time patient was told to lose weight, so that the current body mass index would get into a close to 25 or between 20-25, patient was told that the patient can achieve this by starting an active life style modifications, working on the diet, increase physical activity, limit alcohol consumption, stop secondhand tobacco exposure,    for which includes creating a an interesting delightful to do list,  such as start of a light physical activity with a brisk daily walking 30 minutes most days of the week, most likely to total of 150 minutes per week, then the patient was told to try to avoid fatty fast foods, have a low-fat low-cholesterol diet, include seafood such as adding fatty fish such as Emiliano Hock, Mackerel, [place] to the diet, increase vegetables and fruits, nuts 3-4 times per week and finally have a low-salt and K rich food intake for a good 4-6 months possibly for ever for the best outcome, patient was told that either a DASH diet or Mediterranean diet but satisfies the need     Routine labs ordered, and the needed abnormal labs will be discussed soon and they can be repeated in 3-6 months. In addition relevant handouts were given to the patient for a better understanding,    patient was told to call if any problems. Patient acknowledged understanding and  agreed with today's recommendations.

## 2018-04-11 NOTE — PROGRESS NOTES
Kimmie Agosto is a 34 y.o. female    Chief Complaint   Patient presents with    Obesity     follow up     1. Have you been to the ER, urgent care clinic since your last visit? Hospitalized since your last visit? No    2. Have you seen or consulted any other health care providers outside of the [facility] since your last visit? Include any pap smears or colon screening.  No      Health Maintenance Due   Topic Date Due    PAP AKA CERVICAL CYTOLOGY  04/24/2018

## 2018-05-24 LAB
HBSAG, EXTERNAL: NEGATIVE
HIV, EXTERNAL: NON REACTIVE
RUBELLA, EXTERNAL: NORMAL
T. PALLIDUM, EXTERNAL: NEGATIVE

## 2018-11-13 LAB — TYPE, ABO & RH, EXTERNAL: NORMAL

## 2018-12-04 LAB — ANTIBODY SCREEN, EXTERNAL: NEGATIVE

## 2019-01-02 LAB — GRBS, EXTERNAL: POSITIVE

## 2019-01-16 ENCOUNTER — HOSPITAL ENCOUNTER (INPATIENT)
Age: 34
LOS: 2 days | Discharge: HOME | End: 2019-01-18
Attending: OBSTETRICS & GYNECOLOGY | Admitting: OBSTETRICS & GYNECOLOGY
Payer: COMMERCIAL

## 2019-01-16 ENCOUNTER — ANESTHESIA (OUTPATIENT)
Dept: LABOR AND DELIVERY | Age: 34
End: 2019-01-16
Payer: COMMERCIAL

## 2019-01-16 ENCOUNTER — ANESTHESIA EVENT (OUTPATIENT)
Dept: LABOR AND DELIVERY | Age: 34
End: 2019-01-16
Payer: COMMERCIAL

## 2019-01-16 DIAGNOSIS — G89.18 POSTOPERATIVE PAIN: Primary | ICD-10-CM

## 2019-01-16 PROBLEM — Z34.90 PREGNANCY: Status: ACTIVE | Noted: 2019-01-16

## 2019-01-16 LAB
ABO + RH BLD: NORMAL
ALBUMIN SERPL-MCNC: 2.8 G/DL (ref 3.5–5)
ALBUMIN/GLOB SERPL: 0.8 {RATIO} (ref 1.1–2.2)
ALP SERPL-CCNC: 150 U/L (ref 45–117)
ALT SERPL-CCNC: 28 U/L (ref 12–78)
ANION GAP SERPL CALC-SCNC: 12 MMOL/L (ref 5–15)
AST SERPL-CCNC: 23 U/L (ref 15–37)
BILIRUB SERPL-MCNC: 0.3 MG/DL (ref 0.2–1)
BLOOD GROUP ANTIBODIES SERPL: NORMAL
BUN SERPL-MCNC: 7 MG/DL (ref 6–20)
BUN/CREAT SERPL: 11 (ref 12–20)
CALCIUM SERPL-MCNC: 8.5 MG/DL (ref 8.5–10.1)
CHLORIDE SERPL-SCNC: 107 MMOL/L (ref 97–108)
CO2 SERPL-SCNC: 20 MMOL/L (ref 21–32)
CREAT SERPL-MCNC: 0.66 MG/DL (ref 0.55–1.02)
ERYTHROCYTE [DISTWIDTH] IN BLOOD BY AUTOMATED COUNT: 14.3 % (ref 11.5–14.5)
GLOBULIN SER CALC-MCNC: 3.7 G/DL (ref 2–4)
GLUCOSE SERPL-MCNC: 129 MG/DL (ref 65–100)
HCT VFR BLD AUTO: 37.7 % (ref 35–47)
HGB BLD-MCNC: 12.5 G/DL (ref 11.5–16)
LDH SERPL L TO P-CCNC: 184 U/L (ref 81–246)
MCH RBC QN AUTO: 28.6 PG (ref 26–34)
MCHC RBC AUTO-ENTMCNC: 33.2 G/DL (ref 30–36.5)
MCV RBC AUTO: 86.3 FL (ref 80–99)
NRBC # BLD: 0 K/UL (ref 0–0.01)
NRBC BLD-RTO: 0 PER 100 WBC
PLATELET # BLD AUTO: 233 K/UL (ref 150–400)
PMV BLD AUTO: 12.5 FL (ref 8.9–12.9)
POTASSIUM SERPL-SCNC: 4 MMOL/L (ref 3.5–5.1)
PROT SERPL-MCNC: 6.5 G/DL (ref 6.4–8.2)
RBC # BLD AUTO: 4.37 M/UL (ref 3.8–5.2)
SODIUM SERPL-SCNC: 139 MMOL/L (ref 136–145)
SPECIMEN EXP DATE BLD: NORMAL
URATE SERPL-MCNC: 3.9 MG/DL (ref 2.6–6)
WBC # BLD AUTO: 6.3 K/UL (ref 3.6–11)

## 2019-01-16 PROCEDURE — 77030007866 HC KT SPN ANES BBMI -B: Performed by: ANESTHESIOLOGY

## 2019-01-16 PROCEDURE — 77030031139 HC SUT VCRL2 J&J -A

## 2019-01-16 PROCEDURE — 77030018836 HC SOL IRR NACL ICUM -A

## 2019-01-16 PROCEDURE — 80053 COMPREHEN METABOLIC PANEL: CPT

## 2019-01-16 PROCEDURE — 83615 LACTATE (LD) (LDH) ENZYME: CPT

## 2019-01-16 PROCEDURE — 86900 BLOOD TYPING SEROLOGIC ABO: CPT

## 2019-01-16 PROCEDURE — 76060000078 HC EPIDURAL ANESTHESIA: Performed by: OBSTETRICS & GYNECOLOGY

## 2019-01-16 PROCEDURE — 77030032490 HC SLV COMPR SCD KNE COVD -B

## 2019-01-16 PROCEDURE — 84550 ASSAY OF BLOOD/URIC ACID: CPT

## 2019-01-16 PROCEDURE — 74011250636 HC RX REV CODE- 250/636

## 2019-01-16 PROCEDURE — 74011250636 HC RX REV CODE- 250/636: Performed by: OBSTETRICS & GYNECOLOGY

## 2019-01-16 PROCEDURE — 77030011255 HC DSG AQUACEL AG BMS -A

## 2019-01-16 PROCEDURE — 77030018846 HC SOL IRR STRL H20 ICUM -A

## 2019-01-16 PROCEDURE — 65410000002 HC RM PRIVATE OB

## 2019-01-16 PROCEDURE — 36415 COLL VENOUS BLD VENIPUNCTURE: CPT

## 2019-01-16 PROCEDURE — 76010000391 HC C SECN FIRST 1 HR: Performed by: OBSTETRICS & GYNECOLOGY

## 2019-01-16 PROCEDURE — 77030008467 HC STPLR SKN COVD -B

## 2019-01-16 PROCEDURE — 85027 COMPLETE CBC AUTOMATED: CPT

## 2019-01-16 PROCEDURE — 75410000003 HC RECOV DEL/VAG/CSECN EA 0.5 HR: Performed by: OBSTETRICS & GYNECOLOGY

## 2019-01-16 PROCEDURE — 76010000392 HC C SECN EA ADDL 0.5 HR: Performed by: OBSTETRICS & GYNECOLOGY

## 2019-01-16 PROCEDURE — 74011000250 HC RX REV CODE- 250

## 2019-01-16 RX ORDER — SODIUM CHLORIDE, SODIUM LACTATE, POTASSIUM CHLORIDE, CALCIUM CHLORIDE 600; 310; 30; 20 MG/100ML; MG/100ML; MG/100ML; MG/100ML
INJECTION, SOLUTION INTRAVENOUS
Status: DISCONTINUED | OUTPATIENT
Start: 2019-01-16 | End: 2019-01-16 | Stop reason: HOSPADM

## 2019-01-16 RX ORDER — SODIUM CHLORIDE, SODIUM LACTATE, POTASSIUM CHLORIDE, CALCIUM CHLORIDE 600; 310; 30; 20 MG/100ML; MG/100ML; MG/100ML; MG/100ML
125 INJECTION, SOLUTION INTRAVENOUS CONTINUOUS
Status: DISCONTINUED | OUTPATIENT
Start: 2019-01-16 | End: 2019-01-18 | Stop reason: HOSPADM

## 2019-01-16 RX ORDER — MORPHINE SULFATE 0.5 MG/ML
INJECTION, SOLUTION EPIDURAL; INTRATHECAL; INTRAVENOUS AS NEEDED
Status: DISCONTINUED | OUTPATIENT
Start: 2019-01-16 | End: 2019-01-16 | Stop reason: HOSPADM

## 2019-01-16 RX ORDER — NALOXONE HYDROCHLORIDE 0.4 MG/ML
0.4 INJECTION, SOLUTION INTRAMUSCULAR; INTRAVENOUS; SUBCUTANEOUS AS NEEDED
Status: DISCONTINUED | OUTPATIENT
Start: 2019-01-16 | End: 2019-01-18 | Stop reason: HOSPADM

## 2019-01-16 RX ORDER — PROPOFOL 10 MG/ML
INJECTION, EMULSION INTRAVENOUS AS NEEDED
Status: DISCONTINUED | OUTPATIENT
Start: 2019-01-16 | End: 2019-01-16 | Stop reason: HOSPADM

## 2019-01-16 RX ORDER — ONDANSETRON 4 MG/1
4 TABLET, ORALLY DISINTEGRATING ORAL
Status: DISCONTINUED | OUTPATIENT
Start: 2019-01-16 | End: 2019-01-18 | Stop reason: HOSPADM

## 2019-01-16 RX ORDER — ONDANSETRON 2 MG/ML
4 INJECTION INTRAMUSCULAR; INTRAVENOUS
Status: ACTIVE | OUTPATIENT
Start: 2019-01-16 | End: 2019-01-17

## 2019-01-16 RX ORDER — DIPHENHYDRAMINE HCL 25 MG
50 CAPSULE ORAL
Status: DISCONTINUED | OUTPATIENT
Start: 2019-01-16 | End: 2019-01-18 | Stop reason: HOSPADM

## 2019-01-16 RX ORDER — CEFAZOLIN SODIUM/WATER 2 G/20 ML
2 SYRINGE (ML) INTRAVENOUS ONCE
Status: DISCONTINUED | OUTPATIENT
Start: 2019-01-16 | End: 2019-01-16 | Stop reason: HOSPADM

## 2019-01-16 RX ORDER — OXYTOCIN/RINGER'S LACTATE 20/1000 ML
999 PLASTIC BAG, INJECTION (ML) INTRAVENOUS AS NEEDED
Status: DISCONTINUED | OUTPATIENT
Start: 2019-01-16 | End: 2019-01-18 | Stop reason: HOSPADM

## 2019-01-16 RX ORDER — CEFAZOLIN SODIUM 1 G/3ML
INJECTION, POWDER, FOR SOLUTION INTRAMUSCULAR; INTRAVENOUS AS NEEDED
Status: DISCONTINUED | OUTPATIENT
Start: 2019-01-16 | End: 2019-01-16 | Stop reason: HOSPADM

## 2019-01-16 RX ORDER — HYDROCORTISONE 1 %
CREAM (GRAM) TOPICAL AS NEEDED
Status: DISCONTINUED | OUTPATIENT
Start: 2019-01-16 | End: 2019-01-18 | Stop reason: HOSPADM

## 2019-01-16 RX ORDER — SODIUM CHLORIDE 0.9 % (FLUSH) 0.9 %
5-40 SYRINGE (ML) INJECTION AS NEEDED
Status: DISCONTINUED | OUTPATIENT
Start: 2019-01-16 | End: 2019-01-18 | Stop reason: HOSPADM

## 2019-01-16 RX ORDER — MORPHINE SULFATE 10 MG/ML
6 INJECTION, SOLUTION INTRAMUSCULAR; INTRAVENOUS
Status: ACTIVE | OUTPATIENT
Start: 2019-01-16 | End: 2019-01-17

## 2019-01-16 RX ORDER — AMMONIA 15 % (W/V)
1 AMPUL (EA) INHALATION AS NEEDED
Status: DISCONTINUED | OUTPATIENT
Start: 2019-01-16 | End: 2019-01-18 | Stop reason: HOSPADM

## 2019-01-16 RX ORDER — ZOLPIDEM TARTRATE 5 MG/1
5 TABLET ORAL
Status: DISCONTINUED | OUTPATIENT
Start: 2019-01-16 | End: 2019-01-18 | Stop reason: HOSPADM

## 2019-01-16 RX ORDER — BUPIVACAINE HYDROCHLORIDE 7.5 MG/ML
INJECTION, SOLUTION EPIDURAL; RETROBULBAR AS NEEDED
Status: DISCONTINUED | OUTPATIENT
Start: 2019-01-16 | End: 2019-01-16 | Stop reason: HOSPADM

## 2019-01-16 RX ORDER — KETOROLAC TROMETHAMINE 30 MG/ML
30 INJECTION, SOLUTION INTRAMUSCULAR; INTRAVENOUS
Status: DISPENSED | OUTPATIENT
Start: 2019-01-16 | End: 2019-01-17

## 2019-01-16 RX ORDER — OXYCODONE AND ACETAMINOPHEN 5; 325 MG/1; MG/1
2 TABLET ORAL
Status: DISCONTINUED | OUTPATIENT
Start: 2019-01-16 | End: 2019-01-18 | Stop reason: HOSPADM

## 2019-01-16 RX ORDER — SIMETHICONE 80 MG
80 TABLET,CHEWABLE ORAL
Status: DISCONTINUED | OUTPATIENT
Start: 2019-01-16 | End: 2019-01-18 | Stop reason: HOSPADM

## 2019-01-16 RX ORDER — OXYTOCIN/RINGER'S LACTATE 20/1000 ML
125-1000 PLASTIC BAG, INJECTION (ML) INTRAVENOUS
Status: DISCONTINUED | OUTPATIENT
Start: 2019-01-16 | End: 2019-01-18 | Stop reason: HOSPADM

## 2019-01-16 RX ORDER — OXYTOCIN/RINGER'S LACTATE 20/1000 ML
PLASTIC BAG, INJECTION (ML) INTRAVENOUS
Status: DISCONTINUED | OUTPATIENT
Start: 2019-01-16 | End: 2019-01-16

## 2019-01-16 RX ORDER — IBUPROFEN 400 MG/1
800 TABLET ORAL EVERY 8 HOURS
Status: DISCONTINUED | OUTPATIENT
Start: 2019-01-16 | End: 2019-01-18 | Stop reason: HOSPADM

## 2019-01-16 RX ORDER — KETOROLAC TROMETHAMINE 30 MG/ML
INJECTION, SOLUTION INTRAMUSCULAR; INTRAVENOUS AS NEEDED
Status: DISCONTINUED | OUTPATIENT
Start: 2019-01-16 | End: 2019-01-16 | Stop reason: HOSPADM

## 2019-01-16 RX ORDER — OXYCODONE AND ACETAMINOPHEN 5; 325 MG/1; MG/1
1 TABLET ORAL
Status: DISCONTINUED | OUTPATIENT
Start: 2019-01-16 | End: 2019-01-18 | Stop reason: HOSPADM

## 2019-01-16 RX ORDER — SODIUM CHLORIDE 0.9 % (FLUSH) 0.9 %
5-40 SYRINGE (ML) INJECTION EVERY 8 HOURS
Status: DISCONTINUED | OUTPATIENT
Start: 2019-01-16 | End: 2019-01-18 | Stop reason: HOSPADM

## 2019-01-16 RX ORDER — ACETAMINOPHEN 325 MG/1
650 TABLET ORAL
Status: DISCONTINUED | OUTPATIENT
Start: 2019-01-16 | End: 2019-01-18 | Stop reason: HOSPADM

## 2019-01-16 RX ORDER — NALBUPHINE HYDROCHLORIDE 10 MG/ML
2.5 INJECTION, SOLUTION INTRAMUSCULAR; INTRAVENOUS; SUBCUTANEOUS
Status: ACTIVE | OUTPATIENT
Start: 2019-01-16 | End: 2019-01-17

## 2019-01-16 RX ORDER — ACETAMINOPHEN 10 MG/ML
INJECTION, SOLUTION INTRAVENOUS AS NEEDED
Status: DISCONTINUED | OUTPATIENT
Start: 2019-01-16 | End: 2019-01-16 | Stop reason: HOSPADM

## 2019-01-16 RX ORDER — DOCUSATE SODIUM 100 MG/1
100 CAPSULE, LIQUID FILLED ORAL
Status: DISCONTINUED | OUTPATIENT
Start: 2019-01-16 | End: 2019-01-18 | Stop reason: HOSPADM

## 2019-01-16 RX ORDER — KETAMINE HYDROCHLORIDE 10 MG/ML
INJECTION, SOLUTION INTRAMUSCULAR; INTRAVENOUS AS NEEDED
Status: DISCONTINUED | OUTPATIENT
Start: 2019-01-16 | End: 2019-01-16 | Stop reason: HOSPADM

## 2019-01-16 RX ORDER — MORPHINE SULFATE 10 MG/ML
10 INJECTION, SOLUTION INTRAMUSCULAR; INTRAVENOUS
Status: ACTIVE | OUTPATIENT
Start: 2019-01-16 | End: 2019-01-17

## 2019-01-16 RX ADMIN — PROPOFOL 20 MG: 10 INJECTION, EMULSION INTRAVENOUS at 13:50

## 2019-01-16 RX ADMIN — PROPOFOL 30 MG: 10 INJECTION, EMULSION INTRAVENOUS at 13:41

## 2019-01-16 RX ADMIN — PROPOFOL 30 MG: 10 INJECTION, EMULSION INTRAVENOUS at 13:52

## 2019-01-16 RX ADMIN — PROPOFOL 30 MG: 10 INJECTION, EMULSION INTRAVENOUS at 13:29

## 2019-01-16 RX ADMIN — MORPHINE SULFATE 200 MCG: 0.5 INJECTION, SOLUTION EPIDURAL; INTRATHECAL; INTRAVENOUS at 13:13

## 2019-01-16 RX ADMIN — ACETAMINOPHEN 1000 MG: 10 INJECTION, SOLUTION INTRAVENOUS at 13:45

## 2019-01-16 RX ADMIN — PROPOFOL 20 MG: 10 INJECTION, EMULSION INTRAVENOUS at 13:43

## 2019-01-16 RX ADMIN — PROPOFOL 20 MG: 10 INJECTION, EMULSION INTRAVENOUS at 13:40

## 2019-01-16 RX ADMIN — BUPIVACAINE HYDROCHLORIDE 12 MG: 7.5 INJECTION, SOLUTION EPIDURAL; RETROBULBAR at 13:13

## 2019-01-16 RX ADMIN — PROPOFOL 20 MG: 10 INJECTION, EMULSION INTRAVENOUS at 13:28

## 2019-01-16 RX ADMIN — PROPOFOL 30 MG: 10 INJECTION, EMULSION INTRAVENOUS at 13:44

## 2019-01-16 RX ADMIN — CEFAZOLIN SODIUM 2 G: 1 INJECTION, POWDER, FOR SOLUTION INTRAMUSCULAR; INTRAVENOUS at 13:24

## 2019-01-16 RX ADMIN — SODIUM CHLORIDE, SODIUM LACTATE, POTASSIUM CHLORIDE, CALCIUM CHLORIDE: 600; 310; 30; 20 INJECTION, SOLUTION INTRAVENOUS at 13:04

## 2019-01-16 RX ADMIN — Medication: at 13:42

## 2019-01-16 RX ADMIN — SODIUM CHLORIDE, SODIUM LACTATE, POTASSIUM CHLORIDE, AND CALCIUM CHLORIDE 125 ML/HR: 600; 310; 30; 20 INJECTION, SOLUTION INTRAVENOUS at 15:02

## 2019-01-16 RX ADMIN — KETAMINE HYDROCHLORIDE 20 MG: 10 INJECTION, SOLUTION INTRAMUSCULAR; INTRAVENOUS at 13:29

## 2019-01-16 RX ADMIN — KETOROLAC TROMETHAMINE 30 MG: 30 INJECTION, SOLUTION INTRAMUSCULAR; INTRAVENOUS at 13:52

## 2019-01-16 NOTE — H&P
[address] [place], [address] Phone: [phone], Fax: [phone] Date: 2019 Pregnancy Problems: 
?  rupture of membranes ? Group B Streptococcus carrier - rx in labor ? Genital herpes simplex ? Threatened premature labor - not delivered - U/S cervical length__ 
? Finding related to pregnancy - Advanced paternal age_repeat u/s at 28wks efw 61%, nl growth_ rpt 32wk EFW 56th%ile ? Uterine scar from previous surgery affecting pregnancy - Prev LTCS desires repeat/desires BTL at [facility], consent received ? Pre-existing type 2 diabetes mellitus in pregnancy - AODM;fetalscnw/echo_, mo growth_, weekly bpp>32wks_. Needs diabetic counseling, start 4-6u NPH at night () ? Genital herpes simplex in mother complicating pregnancy - HSV-Valtrex @ 39 wks___declines rx 18 ? Chronic hypertension complicating AND/OR reason for care during pregnancy - CHTN on baby aspirin growth & surv___. 24hr prot () 415; repeat prot__ 
? High risk pregnancy - Start: 2018 History of Present Illness: 
patient presented to office this am with ? LOF. ROM confirmed by sterile spec exam. 
 
pregnancy c/b type 2 DM - no meds. +/- control CHTN- taking ASA HSV- declined taking valtrex for suppression 
 
no contractions at this time Assessment/Plan 1. Type 2 diabetes mellitus - 
no meds E11.9: Type 2 diabetes mellitus without complications 2. High risk pregnancy O09.93: Supervision of high risk pregnancy, unspecified, third trimester 3. Genital herpes simplex in mother complicating pregnancy A60.09: Herpesviral infection of other urogenital tract 4. Uterine scar from previous surgery affecting pregnancy - 
discussed w patient, desires repeat CS, NOT TOLAC 
- risks of CS reviewed - bleeding/transfusion, infection, injury to internal organs, need for further surgery to repair, all future deliveries should be CS. questions answered. O34.29: Maternal care due to uterine scar from other previous surgery 5. Chronic hypertension complicating AND/OR reason for care during pregnancy - 
no meds O16.9: Unspecified maternal hypertension, unspecified trimester Return to Office ? [id number] NST [address] 1 [facility] [facility] on 01/16/2019 at 09:30 AM 
? for Return OB at [facility] [facility] on or around 01/21/2019 
? for Return OB at [facility] [facility] on or around 01/21/2019 
? for Ultrasound Testing at [facility] [facility] on or around 01/21/2019 ? Temo Espinoza MD for Surgery at [facility] [place] () on 01/24/2019 at 07:00 AM 
? for Return OB at [facility] [facility] on or around 01/28/2019 
? for Ultrasound Testing at [facility] [facility] on or around 01/28/2019 Prenatal Flowsheet: 
Fundus Pres FHR FM PLS Cervix Exam BP Wt Edema Glucose Protein Leukocytes Nitrite Ketones 09/12/2018     [id number]                    
20 cm  154 Yes   122/80 208 lbs none none neg Comments: 24hr urine instructed for next visit. Patient reports fasting sugars in 80's and 1hr in 120-140 range. DM well controlled with diet only. U/S fetal scan today after visit Recommended low carbohydrate/low salt diet with frequent high protein meals. Encouraged hydration. Patient desires Tubal Ligation, consent obtained in office today. Patient advised to call if worsening headache or vision changes. 10/11/2018     [id number]                    
24 cm  148    120/82 210 lbs  none neg Comments: US prior to apt. EFW is at the 48th percentile and BRANDON is normal. AODM. Pt did not bring in her glucos logs_Discussed serious risks associated with AODM in pregnacy including fetal death. Strongly encouraged compliance Denies any headaches, n/v, bleeding, spotting or change in discharge. Feels fatigues, doesn't get enough sleep. Still needs to get her 24 hr urine jug. No evidence of PIH Asked and reassured ok to drink a small cup of coffee/day. 10/11/2018                        
  148       none neg     
11/08/2018     [id number]                    
               
11/08/2018                        
  144 Yes   112/80 209 lbs trace none neg Comments: third trimester labs - no glucola flu and tdap declined per patient 12/04/2018     [id number]                    
  147 Yes  1cm / 60% / -3 126/86 212 lbs trace none neg Comments: AODM_on no meds. Pt did not bring in her BS logs. Has not monitored for last 2 days. Reports previous fasting in 100's, 1hr pp in 120's_Rec to start Insulin at night (4-6u NPH at night), needs diabetic teaching ASAP. CHTN_ BP today 126/86. Advised to monitor BP at home. Pt on daily low dose ASA. Advised patient should be out of work for high risk pregnancy, pre-term labor risk, CHTN, DM and initiating insulin. Work note given. Plan for NST BRANDON this week and diabetic teaching this week. NST and BPP weekly. Reports swelling in ankles and vagina_ On exam, engorged veins Pt left prior to being seen at last visit. Will order 28wk labs today, no glucola. 12/06/2018     [email or web address]                    
32 cm  144 Yes   120/80 212 lbs none none neg Comments: Feeling well overall. Has not yet started insulin. States fasting BG still 98-low 100s. PP BG wnl. Has met with diabetic educator earlier in pregnancy. Will schedule another visit. BP wnl. GS today EFW 2083, 56rd%ile, BRANDON 14.6 cm. BPP 8/8. CL 42.6 mm. NST with baseline 140, moderate variability, one accel noted, several variable decels noted. 2 contractions on toco with palpable contractions on exam. Discussed recommendation to send for prolonged monitoring and r/o PTL at [facility] (preferred delivery hospital), patient amenable. L&D made aware. Deck physician (Dr. Cl Zuñiga) notified. 12/13/2018     [id number]                                        
  137 Yes   120/78 213 lbs none none neg Comments: Missed appt Tuesday. Reports she has to pay $300 out of pocket for NST/BRANDON. Aware of rec for weekly  testing. Seen in L+D last week and d/c'd home w/o limitations. Fastings are in early 90s, 80, 93. Used to be > 100. Pt has made some diet changes that have been helpful. Increase protein. Urged compliance with snacks. Reports all PP in range. + FM. Denies pressure or cxns. URI sx x 2w. Denies fever, sinusitis. Rx antix, f/u in 1w, needs weekly testing. [address] Sou[address] advised. No insulin prescribed. (A1C 6.3%). Bring log and f/u next week. 2018 Vertex 161 Yes   120/78 214 lbs none none neg Comments: U/S today prior to visit reveals brandon nl and BPP 8/8. NST today, reactive AODM. Fastings 90, 93, 99 and 102. 1hr -120, 140 x 2, 1hr lunch 120-140's. Cut fruit, increase snacks. Out of work since  due to risk factors. Reports HA yesterday that relieved with her ASA. Mild nausea with Z-gilbert for URI, completed course yesterday. URI sx are improved. Denies vomiting, discharge/bleeding/cramping. 2018     [id number]                                        
               
2019                        
  133 Yes  0cm / 50% / -3 122/82 220 lbs trace none neg     
2019     [id number]                                        
  130 Yes Comments: BPP 10/10. Doing well. BG in range. GBS collected. Keep weekly f/u. [address], precautions advised. 2019     [id number]                                        
  132 Yes  1cm / 80% / -1 140/92 222 lbs trace none neg Comments: Possible LOF. Gush this morning at 5am. Confirmed on exam, sterile spec exam, sterile vaginal exam. L+D notified, GBS +, non compliant with valtrex. Admit for likely C/S, however  consent signed if spon labor. Cx changing 2019     [email or web address] Allergies: Allergies not reviewed (last reviewed 2019) NKDA Medications: Reviewed Medications Adult Aspirin Regimen 81 mg tablet,delayed release Take 1 tablet(s) every day by oral route. 09/11/18   entered FreeStyle Freedom Lite kit 06/07/18   filled FreeStyle Lancets 28 gauge 08/13/18   filled FreeStyle Lite Strips 
test 4x daily 09/24/18   filled PNV-DHA 27 mg iron-1 mg-300 mg capsule 09/24/18   filled   
valACYclovir 500 mg tablet Take 1 tablet twice daily by oral route. 01/02/19   prescribed Valtrex 1 gram tablet Take 1 tablet(s) every 12 hours by oral route. 12/20/18   entered Vital Signs: 
None recorded Problems: 
Reviewed Problems ? Genital herpes simplex ? Type 2 diabetes mellitus - Onset: 05/05/2016 - Entered By: Timbo CACERESigned By: Timbo HOOK Description: Diabetes Mellitus Type II code: 250.00 ? Pre-existing type 2 diabetes mellitus in pregnancy - AODM;fetalscnw/echo_, mo growth_, weekly bpp>32wks_. Needs diabetic counseling, start 4-6u NPH at night (12/4) ? Vitamin D deficiency - Onset: 05/05/2016 - Entered By: Timbo CACERESigned By: Timbo HOOK Description: VitD deficiency code: 268.9 ? Obesity - Onset: 08/05/2015 - Entered By: Sourav NGUYENigned By: Timbo HOOK Description: Obesity (BMI > 29.99) code: 278.00 
? Premenstrual tension syndrome - Onset: 09/02/2014 - Entered By: Sourav Hill MDSigned By: Sourav Hill MD Description: PMS-PMDD code: 891.2 ? Threatened premature labor - not delivered - U/S cervical length__ 
? Uterine scar from previous surgery affecting pregnancy - Prev LTCS desires repeat/desires BTL at [facility], consent received ? Family history of malignant neoplasm of breast - Onset: 07/29/2016 - Entered By: Reymundo Heart LPNSigned By: Timbo HOOK Description: Family History of Breast Cancer code: V17.4 ? Pregnant - Onset: 08/22/2018 ? Finding related to pregnancy - Advanced paternal age_repeat u/s at 28wks efw 61%, nl growth_ rpt 32wk EFW 56th%ile ? High risk pregnancy - Onset: 05/24/2018 
? Disorder of glucose regulation - Onset: 05/24/2018 - Entered By: Kaur Lu By: Andreina Garcia Description: AODM;fetalscnw/echo_, mo growth_, weekly bpp>32wks_ code: 56.46 
? Chronic hypertension complicating AND/OR reason for care during pregnancy - CHTN on baby aspirin growth & surv___. 24hr prot (11/18) 415; repeat prot__ ? Genital herpes simplex in mother complicating pregnancy - HSV-Valtrex @ 39 wks___declines rx 12/20/18 RLTCS and BTL 1/24/19 7am [facility] Hickman by facility Past Medical History Discussed Past Medical History Anxiety Disorder: Anam Lazo Depression: Y Diabetes Mellitus: Y - gestational 
 
Family History: 
Discussed Family History Maternal Grandmother                  - Malignant neoplasm of uterus Mother - Diabetes mellitus - Malignant tumor of breast  
  - Cerebrovascular accident Paternal Grandmother - Diabetes mellitus Father - Heart disease Social History: 
Discussed Social History OB/GYN Social History Smoking Status: Never smoker Marital status: Single (Notes: New relationship- Chetan) Occupation: [employer]- stressful Stressful relationship with BENI- [place]. Works with [employer]. Physical Exam 
Patient is a 41-year-old female. Constitutional:General Appearance: well developed and nourished and pleasant. Level of Distress: no acute distress. Ambulation: ambulating normally. Head:Head: normocephalic. Eyes:Extraocular Movements extraocular movements intact. Ears, Nose, Mouth, Throat:Ears normal hearing. Nose: no external nose lesions. Lungs / Chest:Respiratory effort: unlabored. Auscultation: no wheezing. Cardiovascular:Rate And Rhythm: regular. Heart Sounds: no murmurs. Neurologic:Gait and Station: normal gait. Sensation: grossly intact. Motor: grossly intact. Mental Status Exam:Orientation oriented to person, place, and time. OB Labs Result Value Ref. Range Date Collected Date Reviewed Reviewed By                    Note Initial Labs Blood Type O n/a n/a n/a n/a entered manually on 11/13/2018  
    D (Rh) Type Positive n/a n/a n/a n/a entered manually on 11/13/2018 Antibody Screen Negative NEGATIVE 12/04/2018 12/06/2018 [id number] HCT - Initial        
    HGB - Initial        
    MCV - Initial        
    PLT - Initial        
    VDRL - Initial        
    Urine Culture/Screen HBsAg HIV Counseling/Testing Chlamydia - Initial        
    Gonorrhea - Initial        
    Varicella Rubella Optional Labs HGB Electrophoresis PPD/Quanta Pap Test        
    Cystic Fibrosis HPV Nathen-Sac Familial Dysautonomia Genetic Screening Tests NST        
    TSH Drug screen HCV Ab        
    HCV RNA Urinalysis Rhogam Injection 8-20 Week Labs Ultrasound - Initial        
    1st Trimester Aneuploidy Risk Assessment MSAFP/Multiple Markers Report  09/12/2018 09/15/2018 [id number]   
    2nd Trimester Serum Screening Negative  09/12/2018 09/15/2018 [id number] Amnio/CVS        
    Karyotype Amniotic Fluid (AFP) 24-28 Week Labs HCT - 24-28 Weeks 35.9 % 34.0-46.6 12/04/2018 12/06/2018 [id number] HGB - 24-28 Weeks 11.6 g/dL                     11.1-15.9 12/04/2018 12/06/2018 [id number] MCV - 24-28 Weeks PLT - 24-28 Weeks 270 x10E3/uL 150-379 12/04/2018 12/06/2018 [id number] Diabetes Screen 6.3 % 4.8-5.6 12/04/2018 12/06/2018 [id number] GTT (If Screen Abnormal) D (Rh) Antibody Screen 32-36 Week Labs HCT - 32-36 Weeks HGB - 32-36 Weeks MCV - 32-36 Weeks PLT - 32-36 Weeks Ultrasound - 32-36 Weeks   09/12/2018 09/16/2018 [id number] HIV (When Indicated) VDRL - 32-36 Weeks   12/04/2018 12/06/2018 [id number] Gonorrhea - 32-36 Weeks Chlamydia - 32-36 Weeks Depression screening (when indicated) 35-37 Week Labs Group B Strep Positive NEGATIVE 01/02/2019 01/04/2019 [id number] Resistance testing if penicillin allergic Other Labs Other - PROTEIN:CREATININE RATIO, 24-HOUR URINE 24.4 mg/dL NOT ESTAB. 11/08/2018 11/09/2018 [id number] Other - HBA1C (HEMOGLOBIN A1C), BLOOD 6.3 % 4.8-5.6 12/04/2018 12/06/2018 [id number] Other - URIC ACID, SERUM OR PLASMA   12/04/2018 12/06/2018 [id number] Vaccines Vaccines not reviewed (last reviewed 01/02/2019) Vaccine Type                     Date Amt. Route Site [address] Lot # Mfr. Exp. Date Date 
on VIS                     VIS Given Vaccinator Hepatitis B Hep B 09/18/08

## 2019-01-16 NOTE — ANESTHESIA PREPROCEDURE EVALUATION
Anesthetic History No history of anesthetic complications Review of Systems / Medical History Patient summary reviewed, nursing notes reviewed and pertinent labs reviewed Pulmonary Within defined limits Neuro/Psych Psychiatric history Cardiovascular Hypertension GI/Hepatic/Renal 
Within defined limits Endo/Other Within defined limits Other Findings Physical Exam 
 
Airway Mallampati: II 
TM Distance: > 6 cm Neck ROM: normal range of motion Mouth opening: Normal 
 
 Cardiovascular Regular rate and rhythm,  S1 and S2 normal,  no murmur, click, rub, or gallop Dental 
No notable dental hx Pulmonary Breath sounds clear to auscultation Abdominal 
GI exam deferred Other Findings Anesthetic Plan ASA: 2 Anesthesia type: spinal 
 
 
Post-op pain plan if not by surgeon: intrathecal opiates Anesthetic plan and risks discussed with: Patient

## 2019-01-16 NOTE — ANESTHESIA POSTPROCEDURE EVALUATION
Procedure(s):  SECTION. <BSHSIANPOST> Visit Vitals /85 Pulse 94 Temp 36.4 °C (97.5 °F) Resp 22 Ht 5' 8\" (1.727 m) Wt 100.7 kg (222 lb) SpO2 99% Breastfeeding? Unknown BMI 33.75 kg/m²

## 2019-01-16 NOTE — L&D DELIVERY NOTE
Delivery Summary  Patient: Kimmie Agosto             Circumcision:   NA-? Additional Delivery Comments - repeat CS for SROM      Information for the patient's :  Nisreen Tong [[id number]]       Labor Events:    Labor: No   Rupture Date: 2019   Rupture Time: 5:00 AM   Rupture Type SROM   Amniotic Fluid Volume: Moderate    Amniotic Fluid Description: Clear None   Induction: None       Augmentation: None   Labor Events: None     Cervical Ripening:     None     Delivery Events:  Episiotomy: None   Laceration(s): None     Repaired:      Number of Repair Packets:     Suture Type and Size:       Estimated Blood Loss (ml):  ml       Delivery Date: 2019    Delivery Time: 1:38 PM  Delivery Type: , Low Transverse  Sex:  Male     Gestational Age: 36w4d   Delivery Clinician:  Iola Libman  Living Status: Living   Delivery Location: L&D OR 2          APGARS  One minute Five minutes Ten minutes   Skin color: 1   1        Heart rate: 2   2        Grimace: 2   2        Muscle tone: 2   2        Breathin   2        Totals: 9   9            Presentation: Vertex    Position:        Resuscitation Method:  Tactile Stimulation;Suctioning-bulb     Meconium Stained: None      Cord Information: 3 Vessels  Complications: None  Cord around:    Delayed cord clamping? Yes  Cord clamped date/time:2019  1:39 PM  Disposition of Cord Blood: Lab    Blood Gases Sent?: No    Placenta:  Date/Time: 2019  1:39 PM  Removal: Expressed      Appearance: Normal      Measurements:  Birth Weight:        Birth Length:        Head Circumference:        Chest Circumference:       Abdominal Girth: Other Providers:   EARL KRUSE;MORTEZA CALERO;Juma WILLIAM;, Obstetrician;Primary Nurse;Primary [place] Nurse; Anesthesiologist;Crna           Cord pH:  none    Episiotomy: None   Laceration(s): None     Estimated Blood Loss (ml):     Labor Events  Method: None Augmentation: None   Cervical Ripening:       None        Hospital Problems  Date Reviewed: 2018    None          Operative Vaginal Delivery - none    Group B Strep:   Lab Results   Component Value Date/Time    GrBStrep, External positive 2019     Information for the patient's :  Connor Wagn [[id number]]   No results found for: ABORH, PCTABR, PCTDIG, BILI, ABORHEXT, ABORH    No results found for: APH, APCO2, APO2, AHCO3, ABEC, ABDC, O2ST, EPHV, PCO2V, PO2V, HCO3V, EBEV, EBDV, SITE, RSCOM

## 2019-01-16 NOTE — PROGRESS NOTES
TRANSFER - IN REPORT: 
 
Verbal report received from JANUSZ Velázquez rn(name) on Kasey Marie  being received from L&D(unit) for routine progression of care Report consisted of patients Situation, Background, Assessment and  
Recommendations(SBAR). Information from the following report(s) SBAR was reviewed with the receiving nurse. Opportunity for questions and clarification was provided. Assessment completed upon patients arrival to unit and care assumed.

## 2019-01-16 NOTE — OP NOTES
Operative Note    Name: Marcio Record Number: [id number]   YOB: 1985  Today's Date: 2019      Pre-operative Diagnosis: Prior Uterine Surgery    Post-operative Diagnosis: same but delivered     Operation: low transverse  section Procedure(s):   SECTION    Surgeon(s):  MD Rodney Malik MD    Anesthesia: Spinal    Prophylactic Antibiotics: Ancef  DVT Prophylaxis: Sequential Compression Devices         Fetal Description: ritter     Birth Information:   Information for the patient's :  Chasidy Rosa [[id number]]   Delivery of a   male infant on 2019 at 1:38 PM. Apgars were 9  and 9 . Umbilical Cord: 3 Vessels     Umbilical Cord Events: None     Placenta: Expressed removal with Normal appearance. Amniotic Fluid Volume: Moderate     Amniotic Fluid Description:  Clear        Umbilical Cord: 3 vessels present    Placenta:  manual removal    Estimated Blood Loss (ml):  700    Specimens: none    Implants: none           Complications:  none    Procedure Detail:      After proper patient identification and consent, the patient was taken to the operating room, where spinal anesthesia was administered and found to be adequate. Mendez catheter had been placed using sterile technique. The patient was prepped and draped in the normal sterile fashion. The abdomen was entered using the Pfannenstiel technique. The peritoneum was entered well superior to the bladder without any apparent injury. The bladder flap was created without difficulty. A low transverse uterine incision was made with the scalpel and extended. Amniotomy was performed and the fluid was clear. The babys head was then delivered atraumatically. The nose and mouth were suctioned. The cord was clamped and cut and the baby was handed off to Nursing staff in attendance. Placenta was then removed from the uterus.  The uterus was curettaged with a moist lap pad and cleared of all clots and debris. The uterine incision was closed with 0 Vicryl  in running locking fashion with good hemostasis assured. A second imbricating layer was placed. Good hemostasis was again reassured and the uterus was returned to the abdomen. The pelvis was irrigated with warm normal saline and good hemostasis was again reassured throughout. The fascia was closed with 0 Vicryl in a running fashion. Good hemostasis was assured. The skin was closed with a insorb closure. The patient tolerated the procedure well. Sponge, lap, and needle counts were correct times three and the patient and baby were taken to recovery/postpartum room in stable condition.     Sharee Whittington MD  January 16, 2019  2:08 PM

## 2019-01-16 NOTE — PROGRESS NOTES
1540- SBAR and OR summary from GLADYS Donaldson RN  
 
7053-Updated MIU on about 10 minutes on transferring pt to unit  
 
1700- Updated Dr. Bonny Gosselin on most recent care with last BP's during my care. At this time ok to transfer to the floor with orders received. Prior appropriate labs had been completed and reviewed. No signs of headache, blurred vision or left sided abdominal pain at this time, 300 ml of clear urine over the last 3 hours. 1725- TRANSFER - OUT REPORT: 
 
Verbal report given to EL Chamberlain RN (name) on [place]el [place]  being transferred to  (unit) for routine progression of care Report consisted of patients Situation, Background, Assessment and  
Recommendations(SBAR). Information from the following report(s) SBAR, Intake/Output, MAR, Accordion, Recent Results and Med Rec Status was reviewed with the receiving nurse. Lines:  
Peripheral IV 01/16/19 Anterior; Left Forearm (Active) Site Assessment Clean, dry, & intact 1/16/2019  4:03 PM  
Phlebitis Assessment 0 1/16/2019  4:03 PM  
Infiltration Assessment 0 1/16/2019  4:03 PM  
Dressing Status Clean, dry, & intact 1/16/2019  4:03 PM  
Dressing Type Transparent 1/16/2019  4:03 PM  
Hub Color/Line Status Patent 1/16/2019  4:03 PM  
  
 
Opportunity for questions and clarification was provided. Patient transported with: 
 Registered Nurse

## 2019-01-17 LAB
BASOPHILS # BLD: 0 K/UL (ref 0–0.1)
BASOPHILS NFR BLD: 0 % (ref 0–1)
DIFFERENTIAL METHOD BLD: ABNORMAL
EOSINOPHIL # BLD: 0.2 K/UL (ref 0–0.4)
EOSINOPHIL NFR BLD: 2 % (ref 0–7)
ERYTHROCYTE [DISTWIDTH] IN BLOOD BY AUTOMATED COUNT: 14.2 % (ref 11.5–14.5)
HCT VFR BLD AUTO: 29.9 % (ref 35–47)
HGB BLD-MCNC: 10 G/DL (ref 11.5–16)
IMM GRANULOCYTES # BLD AUTO: 0 K/UL
IMM GRANULOCYTES NFR BLD AUTO: 0 %
LYMPHOCYTES # BLD: 1.6 K/UL (ref 0.8–3.5)
LYMPHOCYTES NFR BLD: 19 % (ref 12–49)
MCH RBC QN AUTO: 28.9 PG (ref 26–34)
MCHC RBC AUTO-ENTMCNC: 33.4 G/DL (ref 30–36.5)
MCV RBC AUTO: 86.4 FL (ref 80–99)
MONOCYTES # BLD: 0.5 K/UL (ref 0–1)
MONOCYTES NFR BLD: 6 % (ref 5–13)
NEUTS SEG # BLD: 5.9 K/UL (ref 1.8–8)
NEUTS SEG NFR BLD: 73 % (ref 32–75)
NRBC # BLD: 0 K/UL (ref 0–0.01)
NRBC BLD-RTO: 0 PER 100 WBC
PLATELET # BLD AUTO: 183 K/UL (ref 150–400)
PMV BLD AUTO: 11.8 FL (ref 8.9–12.9)
RBC # BLD AUTO: 3.46 M/UL (ref 3.8–5.2)
RBC MORPH BLD: ABNORMAL
WBC # BLD AUTO: 8.2 K/UL (ref 3.6–11)
WBC MORPH BLD: ABNORMAL

## 2019-01-17 PROCEDURE — 74011250636 HC RX REV CODE- 250/636: Performed by: ANESTHESIOLOGY

## 2019-01-17 PROCEDURE — 65410000002 HC RM PRIVATE OB

## 2019-01-17 PROCEDURE — 36415 COLL VENOUS BLD VENIPUNCTURE: CPT

## 2019-01-17 PROCEDURE — 74011250637 HC RX REV CODE- 250/637: Performed by: OBSTETRICS & GYNECOLOGY

## 2019-01-17 PROCEDURE — 85025 COMPLETE CBC W/AUTO DIFF WBC: CPT

## 2019-01-17 RX ADMIN — Medication 10 ML: at 07:13

## 2019-01-17 RX ADMIN — SIMETHICONE CHEW TAB 80 MG 80 MG: 80 TABLET ORAL at 14:04

## 2019-01-17 RX ADMIN — OXYCODONE AND ACETAMINOPHEN 1 TABLET: 5; 325 TABLET ORAL at 21:04

## 2019-01-17 RX ADMIN — KETOROLAC TROMETHAMINE 30 MG: 30 INJECTION, SOLUTION INTRAMUSCULAR at 07:13

## 2019-01-17 RX ADMIN — OXYCODONE AND ACETAMINOPHEN 1 TABLET: 5; 325 TABLET ORAL at 21:54

## 2019-01-17 RX ADMIN — KETOROLAC TROMETHAMINE 30 MG: 30 INJECTION, SOLUTION INTRAMUSCULAR at 00:12

## 2019-01-17 RX ADMIN — Medication 10 ML: at 00:12

## 2019-01-17 RX ADMIN — KETOROLAC TROMETHAMINE 30 MG: 30 INJECTION, SOLUTION INTRAMUSCULAR at 14:04

## 2019-01-17 RX ADMIN — IBUPROFEN 800 MG: 400 TABLET, FILM COATED ORAL at 21:01

## 2019-01-17 RX ADMIN — Medication 10 ML: at 14:04

## 2019-01-17 NOTE — PROGRESS NOTES
Post-Operative  Day 1 Mario Alberto Wyatt Assessment:  
Hospital Problems  Date Reviewed: 2018 Codes Class Noted POA Pregnancy ICD-10-CM: Z34.90 ICD-9-CM: V22.2  2019 Unknown Post-Op day 1, stable Doing well. BPs wnl. Plan:   1. Routine post-operative care 2. The risks and benefits of the circumcision  procedure and anesthesia including: bleeding, infection, variability of cosmetic results were discussed at length with the mother. She is aware that future repeat procedures may be necessary. She gives informed consent to proceed as noted and her questions are answered. Information for the patient's :  Herrera Gates [[id number]] , Low Transverse Patient doing well without significant complaint. Nausea and vomiting resolved, tolerating liquids, no flatus, red in place. Current Facility-Administered Medications Medication Dose Route Frequency  oxytocin (PITOCIN) 20 units/1000 ml LR  125-1,000 mL/hr IntraVENous TITRATE  lactated Ringers infusion  125 mL/hr IntraVENous CONTINUOUS  
 sodium chloride (NS) flush 5-40 mL  5-40 mL IntraVENous Q8H  
 ibuprofen (MOTRIN) tablet 800 mg  800 mg Oral Q8H  
 measles, mumps & rubella Vacc (PF) (M-M-R II) injection 0.5 mL  0.5 mL SubCUTAneous PRIOR TO DISCHARGE Vitals: 
Visit Vitals /84 (BP 1 Location: Left arm, BP Patient Position: At rest;Sitting) Pulse 87 Temp 98.2 °F (36.8 °C) Resp 16 Ht 5' 8\" (1.727 m) Wt 100.7 kg (222 lb) SpO2 100% Breastfeeding? Unknown BMI 33.75 kg/m² Temp (24hrs), Av.3 °F (36.8 °C), Min:97.5 °F (36.4 °C), Max:98.8 °F (37.1 °C) Last 24hr Input/Output: 
 
Intake/Output Summary (Last 24 hours) at 2019 1012 Last data filed at 2019 2130 Gross per 24 hour Intake  Output 1575 ml Net -1575 ml Exam:   
    Patient without distress. Lungs clear. Abdomen, bowel sounds present, soft, expected tenderness, fundus firm Wound dressing intact Perineum normal lochia noted Lower extremities are negative for swelling, cords or tenderness. Labs:  
Lab Results Component Value Date/Time WBC 8.2 01/17/2019 05:39 AM  
 WBC 6.3 01/16/2019 11:44 AM  
 WBC 5.9 01/23/2018 10:51 AM  
 HGB 10.0 (L) 01/17/2019 05:39 AM  
 HGB 12.5 01/16/2019 11:44 AM  
 HGB 11.5 01/23/2018 10:51 AM  
 HCT 29.9 (L) 01/17/2019 05:39 AM  
 HCT 37.7 01/16/2019 11:44 AM  
 HCT 35.5 01/23/2018 10:51 AM  
 PLATELET 517 86/62/2000 05:39 AM  
 PLATELET 250 44/52/5110 11:44 AM  
 PLATELET 436 25/96/8894 10:51 AM  
 
 
Recent Results (from the past 24 hour(s)) CBC W/O DIFF Collection Time: 01/16/19 11:44 AM  
Result Value Ref Range WBC 6.3 3.6 - 11.0 K/uL  
 RBC 4.37 3.80 - 5.20 M/uL  
 HGB 12.5 11.5 - 16.0 g/dL HCT 37.7 35.0 - 47.0 % MCV 86.3 80.0 - 99.0 FL  
 MCH 28.6 26.0 - 34.0 PG  
 MCHC 33.2 30.0 - 36.5 g/dL  
 RDW 14.3 11.5 - 14.5 % PLATELET 226 840 - 059 K/uL MPV 12.5 8.9 - 12.9 FL  
 NRBC 0.0 0  WBC ABSOLUTE NRBC 0.00 0.00 - 0.01 K/uL  
TYPE & SCREEN Collection Time: 01/16/19 11:44 AM  
Result Value Ref Range Crossmatch Expiration 01/19/2019 ABO/Rh(D) O POSITIVE Antibody screen NEG METABOLIC PANEL, COMPREHENSIVE Collection Time: 01/16/19 11:44 AM  
Result Value Ref Range Sodium 139 136 - 145 mmol/L Potassium 4.0 3.5 - 5.1 mmol/L Chloride 107 97 - 108 mmol/L  
 CO2 20 (L) 21 - 32 mmol/L Anion gap 12 5 - 15 mmol/L Glucose 129 (H) 65 - 100 mg/dL BUN 7 6 - 20 MG/DL Creatinine 0.66 0.55 - 1.02 MG/DL  
 BUN/Creatinine ratio 11 (L) 12 - 20 GFR est AA >60 >60 ml/min/1.73m2 GFR est non-AA >60 >60 ml/min/1.73m2 Calcium 8.5 8.5 - 10.1 MG/DL Bilirubin, total 0.3 0.2 - 1.0 MG/DL  
 ALT (SGPT) 28 12 - 78 U/L  
 AST (SGOT) 23 15 - 37 U/L Alk.  phosphatase 150 (H) 45 - 117 U/L  
 Protein, total 6.5 6.4 - 8.2 g/dL Albumin 2.8 (L) 3.5 - 5.0 g/dL Globulin 3.7 2.0 - 4.0 g/dL A-G Ratio 0.8 (L) 1.1 - 2.2 LD Collection Time: 01/16/19 11:44 AM  
Result Value Ref Range  81 - 246 U/L  
URIC ACID Collection Time: 01/16/19 11:44 AM  
Result Value Ref Range Uric acid 3.9 2.6 - 6.0 MG/DL  
CBC WITH AUTOMATED DIFF Collection Time: 01/17/19  5:39 AM  
Result Value Ref Range WBC 8.2 3.6 - 11.0 K/uL  
 RBC 3.46 (L) 3.80 - 5.20 M/uL  
 HGB 10.0 (L) 11.5 - 16.0 g/dL HCT 29.9 (L) 35.0 - 47.0 % MCV 86.4 80.0 - 99.0 FL  
 MCH 28.9 26.0 - 34.0 PG  
 MCHC 33.4 30.0 - 36.5 g/dL  
 RDW 14.2 11.5 - 14.5 % PLATELET 920 861 - 837 K/uL MPV 11.8 8.9 - 12.9 FL  
 NRBC 0.0 0  WBC ABSOLUTE NRBC 0.00 0.00 - 0.01 K/uL NEUTROPHILS 73 32 - 75 % LYMPHOCYTES 19 12 - 49 % MONOCYTES 6 5 - 13 % EOSINOPHILS 2 0 - 7 % BASOPHILS 0 0 - 1 % IMMATURE GRANULOCYTES 0 %  
 ABS. NEUTROPHILS 5.9 1.8 - 8.0 K/UL  
 ABS. LYMPHOCYTES 1.6 0.8 - 3.5 K/UL  
 ABS. MONOCYTES 0.5 0.0 - 1.0 K/UL  
 ABS. EOSINOPHILS 0.2 0.0 - 0.4 K/UL  
 ABS. BASOPHILS 0.0 0.0 - 0.1 K/UL  
 ABS. IMM. GRANS. 0.0 K/UL  
 DF MANUAL    
 RBC COMMENTS ANISOCYTOSIS 
1+  WBC COMMENTS ATYPICAL LYMPHOCYTES PRESENT

## 2019-01-17 NOTE — LACTATION NOTE
This note was copied from a baby's chart. Initial Lactation Consultation - Baby born by  this afternoon to a  mom at 45 2/7 weeks gestation. Mom states she nursed her other 2 children for about 3 months. She says this baby has been latching and nursing frequently since birth. Mom has a history of gestational diabetes that was diet controlled. Baby's blood sugar was 82. Feeding Plan: Mother will keep baby skin to skin as often as possible, feed on demand, respond to feeding cues, obtain latch, listen for audible swallowing, be aware of signs of oxytocin release/ cramping, thirst and sleepiness while breastfeeding. Mom will not limit the time the baby is at the breast. She will allow the baby to completely finish one breast and then offer the second breast at each feeding.

## 2019-01-17 NOTE — ROUTINE PROCESS
0730: Bedside shift change report given to TONY Valdez RN (oncoming nurse) by Lesly Patino RN (offgoing nurse). Report included the following information SBAR.

## 2019-01-17 NOTE — LACTATION NOTE
This note was copied from a baby's chart. Mom requesting assistance with latching infant. Mom states he has been fussy and wants to eat all the time. She is concerned that she doesn't have enough milk for baby. I demonstrated manual expression and obtained 4 drops which was given to infant via spoon. He then latched deeply, nursing well on both breasts. I encouraged mom to incorporate breast massage into the feeding regimen. She agrees.

## 2019-01-17 NOTE — LACTATION NOTE
This note was copied from a baby's chart. Baby nursing well today,  deep latch obtained, mother is comfortable, baby feeding vigorously with rhythmic suck, swallow, breathe pattern, audible swallowing, and evident milk transfer, both breasts offered, baby is asleep following feeding. Mom said baby has been wanting to nurse frequently.

## 2019-01-17 NOTE — ROUTINE PROCESS
Bedside shift change report given to Olivia Valencia RN (oncoming nurse) by eLxy Kumar RN (offgoing nurse). Report included the following information SBAR, Kardex, Intake/Output, MAR, Accordion and Recent Results.

## 2019-01-17 NOTE — PROGRESS NOTES
Anesthesia Post Operative Day 1 Patient is s/p spinal / epidural DuraMorph for Cesearean Section. The patient relates mild pain, no itching and no  nausea. All symptoms were treated with protocol meds with good results. Patient is up and ambulating without complaints. Plan: Continue Duramorph protocol as needed.

## 2019-01-17 NOTE — PROGRESS NOTES
Bedside shift change report given to Adam Kelley RN (oncoming nurse) by TONY Valdez RN (offgoing nurse). Report included the following information SBAR.

## 2019-01-18 VITALS
BODY MASS INDEX: 33.65 KG/M2 | SYSTOLIC BLOOD PRESSURE: 145 MMHG | HEIGHT: 68 IN | HEART RATE: 90 BPM | TEMPERATURE: 98.7 F | DIASTOLIC BLOOD PRESSURE: 84 MMHG | OXYGEN SATURATION: 100 % | WEIGHT: 222 LBS | RESPIRATION RATE: 16 BRPM

## 2019-01-18 PROCEDURE — 74011250637 HC RX REV CODE- 250/637: Performed by: OBSTETRICS & GYNECOLOGY

## 2019-01-18 RX ORDER — LABETALOL 100 MG/1
100 TABLET, FILM COATED ORAL EVERY 12 HOURS
Status: DISCONTINUED | OUTPATIENT
Start: 2019-01-18 | End: 2019-01-18 | Stop reason: HOSPADM

## 2019-01-18 RX ORDER — OXYCODONE AND ACETAMINOPHEN 5; 325 MG/1; MG/1
1 TABLET ORAL
Qty: 40 TAB | Refills: 0 | Status: SHIPPED | OUTPATIENT
Start: 2019-01-18 | End: 2019-12-12 | Stop reason: ALTCHOICE

## 2019-01-18 RX ORDER — IBUPROFEN 600 MG/1
600 TABLET ORAL
Qty: 40 TAB | Refills: 0 | Status: SHIPPED | OUTPATIENT
Start: 2019-01-18 | End: 2019-12-12

## 2019-01-18 RX ORDER — LABETALOL 100 MG/1
100 TABLET, FILM COATED ORAL 2 TIMES DAILY
Qty: 60 TAB | Refills: 0 | Status: SHIPPED | OUTPATIENT
Start: 2019-01-18 | End: 2019-12-12

## 2019-01-18 RX ADMIN — SIMETHICONE CHEW TAB 80 MG 80 MG: 80 TABLET ORAL at 05:38

## 2019-01-18 RX ADMIN — IBUPROFEN 800 MG: 400 TABLET, FILM COATED ORAL at 06:39

## 2019-01-18 RX ADMIN — OXYCODONE AND ACETAMINOPHEN 2 TABLET: 5; 325 TABLET ORAL at 01:58

## 2019-01-18 RX ADMIN — OXYCODONE AND ACETAMINOPHEN 2 TABLET: 5; 325 TABLET ORAL at 06:39

## 2019-01-18 RX ADMIN — ACETAMINOPHEN 650 MG: 325 TABLET ORAL at 12:18

## 2019-01-18 RX ADMIN — LABETALOL HYDROCHLORIDE 100 MG: 100 TABLET, FILM COATED ORAL at 10:49

## 2019-01-18 NOTE — ROUTINE PROCESS
Bedside shift change report given to TAMAR Brooks RN (oncoming nurse) by Toni Loja RN (offgoing nurse). Report included the following information SBAR, Kardex, Intake/Output, MAR and Recent Results.

## 2019-01-18 NOTE — PROGRESS NOTES
Post-Operative  Day 2 [place] Assessment: Post-Op day 2, doing well Plan: 1. Routine post-operative care 2. CHTN: normotensive to mildly hypertensive, no meds. 3. Discharge today per patient request 
 
Information for the patient's :  Johnie Centeno [[id number]] , Low Transverse Subjective: 
Patient doing well without significant complaint. Nausea and vomiting resolved, tolerating liquids, passing flatus, voiding and ambulating without difficulty. Wants to go home today. Vitals: 
Visit Vitals /70 (BP 1 Location: Right arm, BP Patient Position: At rest) Pulse 81 Temp 98.2 °F (36.8 °C) Resp 16 Ht 5' 8\" (1.727 m) Wt 100.7 kg (222 lb) SpO2 100% Breastfeeding? Unknown BMI 33.75 kg/m² Temp (24hrs), Av.2 °F (36.8 °C), Min:98 °F (36.7 °C), Max:98.4 °F (36.9 °C) Exam:   
    Patient without distress. Abdomen, bowel sounds present, soft, expected tenderness, fundus firm Wound incision clean, dry and intact Lower extremities are negative for swelling, cords or tenderness. Labs:  
Lab Results Component Value Date/Time  WBC 8.2 2019 05:39 AM  
 WBC 6.3 2019 11:44 AM  
 WBC 5.9 2018 10:51 AM  
 WBC 6.1 10/20/2016 01:25 PM  
 WBC 6.1 2015 01:35 PM  
 WBC 5.9 2014 02:04 PM  
 WBC 5.0 2014 01:21 PM  
 WBC 6.0 2013 05:51 PM  
 WBC 11.9 (H) 2013 12:30 PM  
 WBC 11.5 (H) 2010 08:49 AM  
 WBC 5.8 2010 12:12 PM  
 WBC 5.6 2010 09:57 AM  
 HGB 10.0 (L) 2019 05:39 AM  
 HGB 12.5 2019 11:44 AM  
 HGB 11.5 2018 10:51 AM  
 HGB 14.1 10/20/2016 01:25 PM  
 HGB 13.0 2015 01:35 PM  
 HGB 12.3 2014 02:04 PM  
 HGB 12.7 2014 01:21 PM  
 HGB 13.3 2013 05:51 PM  
 HGB 11.8 2013 12:30 PM  
 HGB 11.9 2010 08:49 AM  
 HGB 10.8 (L) 2010 12:12 PM  
 HGB 11.5 05/26/2010 09:57 AM  
 HCT 29.9 (L) 01/17/2019 05:39 AM  
 HCT 37.7 01/16/2019 11:44 AM  
 HCT 35.5 01/23/2018 10:51 AM  
 HCT 42.2 10/20/2016 01:25 PM  
 HCT 39.1 09/21/2015 01:35 PM  
 HCT 37.1 08/31/2014 02:04 PM  
 HCT 38.4 01/20/2014 01:21 PM  
 HCT 38.9 09/16/2013 05:51 PM  
 HCT 35.4 02/12/2013 12:30 PM  
 HCT 34.6 (L) 05/28/2010 08:49 AM  
 HCT 31.3 (L) 05/27/2010 12:12 PM  
 HCT 33.6 (L) 05/26/2010 09:57 AM  
 PLATELET 741 66/54/2342 05:39 AM  
 PLATELET 118 96/91/3727 11:44 AM  
 PLATELET 780 14/83/8832 10:51 AM  
 PLATELET 163 16/28/4373 01:25 PM  
 PLATELET 932 36/38/1913 01:35 PM  
 PLATELET 260 84/86/8690 02:04 PM  
 PLATELET 908 96/21/5113 01:21 PM  
 PLATELET 002 61/76/7345 05:51 PM  
 PLATELET 572 32/29/2205 12:30 PM  
 PLATELET 981 41/48/4701 08:49 AM  
 PLATELET 682 73/20/0064 12:12 PM  
 PLATELET 269 19/38/8207 09:57 AM  
 
 
No results found for this or any previous visit (from the past 24 hour(s)).

## 2019-01-18 NOTE — DISCHARGE SUMMARY
Gynecology Discharge Summary     Patient ID:  Femi Beach  [id number]  12 y.o.  1985    Admit date: 1/16/2019    Discharge date: 1/18/2019     Admission Diagnoses:   Patient Active Problem List   Diagnosis Code    Acute appendicitis K35.80    Headache(784.0) R46    Insomnia G47.00    Elevated BP [id number]    Depression, acute F32.9    Nauseated R11.0    Lightheadedness R42    High risk sexual behavior Z72.51    Prediabetes R73.03    Chlamydia infection A74.9    Contact dermatitis L25.9    Fatigue R53.83    Poor concentration R41.840    Obesity (BMI 30.0-34. 9) E66.9    Bilateral posterior neck pain M54.2    Exposure to sexually transmitted disease (STD) Z20.2    Disability examination Z02.71    Depressed F32.9    ADHD (attention deficit hyperactivity disorder), combined type F90.2    Encounter for completion of form with patient Z02.89    Depression with anxiety F41.8    Bipolar 2 disorder ([facility] [address] F31.81    Intractable migraine with aura with status migrainosus G43. 111    Concentric fading H53.129    Chronic fatigue R53.82    Pregnancy Z34.90       Discharge Diagnoses: There are no discharge diagnoses documented for the most recent discharge. Patient Active Problem List   Diagnosis Code    Acute appendicitis K35.80    Headache(784.0) R46    Insomnia G47.00    Elevated BP [id number]    Depression, acute F32.9    Nauseated R11.0    Lightheadedness R42    High risk sexual behavior Z72.51    Prediabetes R73.03    Chlamydia infection A74.9    Contact dermatitis L25.9    Fatigue R53.83    Poor concentration R41.840    Obesity (BMI 30.0-34. 9) E66.9    Bilateral posterior neck pain M54.2    Exposure to sexually transmitted disease (STD) Z20.2    Disability examination Z02.71    Depressed F32.9    ADHD (attention deficit hyperactivity disorder), combined type F90.2    Encounter for completion of form with patient Z36.80    Depression with anxiety F41.8    Bipolar 2 disorder ([facility] F31.81    Intractable migraine with aura with status migrainosus G43. 111    Concentric fading H53.129    Chronic fatigue R53.82    Pregnancy Z34.90       Procedures for this admission: Procedure(s):   Road Course: Patient underwent a repeat  and was discharged POD 2. She was started on Labetalol 100mg PO BID for mild range BPs. Disposition: Home or self care    Discharged Condition: stable            Patient Instructions:   Current Discharge Medication List      START taking these medications    Details   oxyCODONE-acetaminophen (PERCOCET) 5-325 mg per tablet Take 1 Tab by mouth every four (4) hours as needed for Pain. Max Daily Amount: 6 Tabs. Qty: 40 Tab, Refills: 0    Associated Diagnoses: Postoperative pain      Labetalol 100mg PO BID  Ibuprofen 600mg PO every 6 hrs prn     STOP taking these medications       phentermine (ADIPEX-P) 37.5 mg tablet Comments:   Reason for Stopping:         phentermine (ADIPEX-P) 37.5 mg tablet Comments:   Reason for Stopping:         phentermine (ADIPEX-P) 37.5 mg tablet Comments:   Reason for Stopping:             Activity: Activity as tolerated  Diet: Regular Diet  Wound Care: Keep wound clean and dry    Follow-up with Dr. Srinath Roach in 1 week for a BP check.     Signed:  Dmitriy Barnes MD  2019  9:28 AM

## 2019-01-18 NOTE — LACTATION NOTE
This note was copied from a baby's chart. Baby nursing well and has improved throughout post partum stay, deep latch maintained, mother is comfortable, milk is in transition, baby feeding vigorously with rhythmic suck, swallow, breathe pattern, with audible swallowing, and evident milk transfer, both breasts offerd, baby is asleep following feeding. Baby is feeding on demand, voiding and stools present as appropriate over the last 24 hours. Infant weight loss 6.9%. Breasts may become engorged when milk \"comes in\". How milk is made / normal phases of milk production, supply and demand discussed. Taught care of engorged breasts - frequent breastfeeding encouraged, warm compresses and breast massage ac. Then nurse the baby or pump. Apply cold compresses pc x 15 minutes a few times a day for swelling or discomfort. May need to do this care for a couple of days. Discussed prevention and treatment of mastitis. Breastfeeding support group information provided to mom.

## 2019-01-18 NOTE — DISCHARGE INSTRUCTIONS
Patient Education         Section: What to Expect at Home  Your Recovery    A  section, or , is surgery to deliver your baby through a cut, called an incision, that the doctor makes in your lower belly and uterus. You may have some pain in your lower belly and need pain medicine for 1 to 2 weeks. You can expect some vaginal bleeding for several weeks. You will probably need about 6 weeks to fully recover. It is important to take it easy while the incision is healing. Avoid heavy lifting, strenuous activities, or exercises that strain the belly muscles while you are recovering. Ask a family member or friend for help with housework, cooking, and shopping. This care sheet gives you a general idea about how long it will take for you to recover. But each person recovers at a different pace. Follow the steps below to get better as quickly as possible. How can you care for yourself at home? Activity    · Rest when you feel tired. Getting enough sleep will help you recover.     · Try to walk each day. Start by walking a little more than you did the day before. Bit by bit, increase the amount you walk. Walking boosts blood flow and helps prevent pneumonia, constipation, and blood clots.     · Avoid strenuous activities, such as bicycle riding, jogging, weightlifting, and aerobic exercise, for 6 weeks or until your doctor says it is okay.     · Until your doctor says it is okay, do not lift anything heavier than your baby.     · Do not do sit-ups or other exercises that strain the belly muscles for 6 weeks or until your doctor says it is okay.     · Hold a pillow over your incision when you cough or take deep breaths. This will support your belly and decrease your pain.     · You may shower as usual. Pat the incision dry when you are done.     · You will have some vaginal bleeding. Wear sanitary pads.  Do not douche or use tampons until your doctor says it is okay.     · Ask your doctor when you can drive again.     · You will probably need to take at least 6 weeks off work. It depends on the type of work you do and how you feel.     · Ask your doctor when it is okay for you to have sex. Diet    · You can eat your normal diet. If your stomach is upset, try bland, low-fat foods like plain rice, broiled chicken, toast, and yogurt.     · Drink plenty of fluids (unless your doctor tells you not to).     · You may notice that your bowel movements are not regular right after your surgery. This is common. Try to avoid constipation and straining with bowel movements. You may want to take a fiber supplement every day. If you have not had a bowel movement after a couple of days, ask your doctor about taking a mild laxative.     · If you are breastfeeding, limit alcohol. Alcohol can cause a lack of energy and other health problems for the baby when a breastfeeding woman drinks heavily. It can also get in the way of a mom's ability to feed her baby or to care for the child in other ways. There isn't a lot of research about exactly how much alcohol can harm a baby. Having no alcohol is the safest choice for your baby. If you choose to have a drink now and then, have only one drink, and limit the number of occasions that you have a drink. Wait to breastfeed at least 2 hours after you have a drink to reduce the amount of alcohol the baby may get in the milk. Medicines    · Your doctor will tell you if and when you can restart your medicines. He or she will also give you instructions about taking any new medicines.     · If you take blood thinners, such as warfarin (Coumadin), clopidogrel (Plavix), or aspirin, be sure to talk to your doctor. He or she will tell you if and when to start taking those medicines again. Make sure that you understand exactly what your doctor wants you to do.     · Take pain medicines exactly as directed. ? If the doctor gave you a prescription medicine for pain, take it as prescribed. ?  If you are not taking a prescription pain medicine, ask your doctor if you can take an over-the-counter medicine.     · If you think your pain medicine is making you sick to your stomach:  ? Take your medicine after meals (unless your doctor has told you not to). ? Ask your doctor for a different pain medicine.     · If your doctor prescribed antibiotics, take them as directed. Do not stop taking them just because you feel better. You need to take the full course of antibiotics. Incision care    · If you have strips of tape on the incision, leave the tape on for a week or until it falls off.     · Wash the area daily with warm, soapy water, and pat it dry. Don't use hydrogen peroxide or alcohol, which can slow healing. You may cover the area with a gauze bandage if it weeps or rubs against clothing. Change the bandage every day.     · Keep the area clean and dry. Other instructions    · If you breastfeed your baby, you may be more comfortable while you are healing if you place the baby so that he or she is not resting on your belly. Try tucking your baby under your arm, with his or her body along the side you will be feeding on. Support your baby's upper body with your arm. With that hand you can control your baby's head to bring his or her mouth to your breast. This is sometimes called the football hold. Follow-up care is a key part of your treatment and safety. Be sure to make and go to all appointments, and call your doctor if you are having problems. It's also a good idea to know your test results and keep a list of the medicines you take. When should you call for help? Call 911 anytime you think you may need emergency care.  For example, call if:    · You passed out (lost consciousness).     · You have chest pain, are short of breath, or cough up blood.    Call your doctor now or seek immediate medical care if:    · You have pain that does not get better after you take pain medicine.     · You have severe vaginal bleeding.     · You are dizzy or lightheaded, or you feel like you may faint.     · You have new or worse pain in your belly or pelvis.     · You have loose stitches, or your incision comes open.     · You have symptoms of infection, such as:  ? Increased pain, swelling, warmth, or redness. ? Red streaks leading from the incision. ? Pus draining from the incision. ? A fever.     · You have symptoms of a blood clot in your leg (called a deep vein thrombosis), such as:  ? Pain in your calf, back of the knee, thigh, or groin. ? Redness and swelling in your leg or groin.    Watch closely for changes in your health, and be sure to contact your doctor if:    · You do not get better as expected. Where can you learn more? Go to http://caleb-bassam.info/. Enter M806 in the search box to learn more about \" Section: What to Expect at Home. \"  Current as of: 2017  Content Version: 11.8  © 5289-7725 [employer]. Care instructions adapted under license by [employer] (which disclaims liability or warranty for this information). If you have questions about a medical condition or this instruction, always ask your healthcare professional. [place] [address] any warranty or liability for your use of this information. Breastfeeding Support Group  [employer] [facility] meets the  and  of each month in the [facility] from 10:00-11:00, (located behind [facility] on the first floor) Meetings are facilitated by board certified lactation consultants and all breastfeeding moms and their infants are invited.

## 2019-12-12 ENCOUNTER — OFFICE VISIT (OUTPATIENT)
Dept: FAMILY MEDICINE CLINIC | Age: 34
End: 2019-12-12

## 2019-12-12 VITALS
HEIGHT: 68 IN | DIASTOLIC BLOOD PRESSURE: 90 MMHG | WEIGHT: 205.6 LBS | HEART RATE: 75 BPM | TEMPERATURE: 97.1 F | RESPIRATION RATE: 20 BRPM | BODY MASS INDEX: 31.16 KG/M2 | SYSTOLIC BLOOD PRESSURE: 140 MMHG | OXYGEN SATURATION: 100 %

## 2019-12-12 DIAGNOSIS — E66.9 OBESITY (BMI 30.0-34.9): Primary | ICD-10-CM

## 2019-12-12 RX ORDER — PHENTERMINE HYDROCHLORIDE 37.5 MG/1
37.5 TABLET ORAL
Qty: 30 TAB | Refills: 0 | Status: SHIPPED | OUTPATIENT
Start: 2020-01-12 | End: 2021-03-29 | Stop reason: SDUPTHER

## 2019-12-12 RX ORDER — PHENTERMINE HYDROCHLORIDE 37.5 MG/1
37.5 TABLET ORAL
Qty: 30 TAB | Refills: 0 | Status: SHIPPED | OUTPATIENT
Start: 2019-12-12 | End: 2021-03-29 | Stop reason: SDUPTHER

## 2019-12-12 NOTE — PROGRESS NOTES
HISTORY OF PRESENT ILLNESS  Dori Mae is a 27 y.o. female. HPI     Obesity  The pt is feeling very good on this meds, feeling less tired and fatigued, becoming to be more concentrated at work and at home, getting along very well with family member and the work place, in addition the pt is becoming to be more active and having daily multiple healthy different diets,  Is more involved with the weekly routine exercises, not a fast fooder, states that the pt does not eat too much as used to do and the portion are very well controlled, likes very much to continue on this medication despite knowing that it is not a safe meds, and  needs to be monitored q3 months and if any thing of unusual, the pt was told if any needs to call us and let us know of any  concern regarding the current meds, hopefully has not had any concern so far,     HTN  Today pt present for Bp check and the patient stating that so far there has not been a Compliancy w/ the bp meds, ptdeveloped GHT but never took any bp meds, having had the low salt diet and try to the best of pt's knowledge to avoid smoked meats, the patient has been active life style and does do the daily walking, in addition pt states that patien does obtain the bp at home few times a week and the average of 120/70 , today the pt denies Chest Pain, has no legs swelling no lightheadedness,the pat has not been feeling anxious, and  Has not been feeling stressed out, otherwise feeling better since the last visit      Current Outpatient Medications   Medication Sig Dispense Refill    oxyCODONE-acetaminophen (PERCOCET) 5-325 mg per tablet Take 1 Tab by mouth every four (4) hours as needed for Pain. Max Daily Amount: 6 Tabs. 40 Tab 0    labetalol (NORMODYNE) 100 mg tablet Take 1 Tab by mouth two (2) times a day. 60 Tab 0    ibuprofen (MOTRIN) 600 mg tablet Take 1 Tab by mouth every six (6) hours as needed for Pain.  40 Tab 0     No Known Allergies  Past Medical History: Diagnosis Date    Abnormal Papanicolaou smear of cervix     follow-up normal    ADHD (attention deficit hyperactivity disorder), combined type 2016    Bipolar 2 disorder ([facility] [address] 10/25/2016    Chlamydia infection 2014    Chronic fatigue 2018    Concentric fading 2018    Depressed 2015    Diabetes ([facility] [address]     patient states pre-diabetes    Disability examination 2015    Encounter for completion of form with patient 2016    Exposure to sexually transmitted disease (STD) 12/3/2014    Fatigue 2014    Genital herpes     no current outbreaks    Hypertension     During pregnacy    Intractable migraine with aura with status migrainosus 11/10/2016    Obesity (BMI 30.0-34. 9) 2014    Poor concentration 2014     Past Surgical History:   Procedure Laterality Date    HX APPENDECTOMY  13    Laparoscopic Appendectomy    HX  SECTION      ,     HX DILATION AND CURETTAGE       Family History   Problem Relation Age of Onset    Cancer Mother     Neuropathy Mother     Stroke Mother      Social History     Tobacco Use    Smoking status: Never Smoker    Smokeless tobacco: Never Used   Substance Use Topics    Alcohol use: No      Lab Results   Component Value Date/Time    WBC 8.2 2019 05:39 AM    HGB 10.0 (L) 2019 05:39 AM    Hemoglobin (POC) 14.3 2010 11:22 AM    HCT 29.9 (L) 2019 05:39 AM    Hematocrit (POC) 42 2010 11:22 AM    PLATELET 840 52/15/9464 05:39 AM    MCV 86.4 2019 05:39 AM     Lab Results   Component Value Date/Time    TSH 2.920 2018 10:51 AM         Review of Systems   Constitutional: Positive for malaise/fatigue. Negative for chills and fever. HENT: Negative for ear pain and nosebleeds. Eyes: Negative for blurred vision, pain and discharge. Respiratory: Negative for shortness of breath. Cardiovascular: Negative for chest pain and leg swelling.    Gastrointestinal: Negative for constipation, diarrhea, nausea and vomiting. Genitourinary: Negative for frequency. Musculoskeletal: Negative for joint pain. Skin: Negative for itching and rash. Neurological: Positive for weakness. Negative for headaches. Psychiatric/Behavioral: Negative for depression. The patient is not nervous/anxious. Physical Exam  Vitals signs and nursing note reviewed. Constitutional:       Appearance: She is well-developed. HENT:      Head: Normocephalic and atraumatic. Eyes:      Conjunctiva/sclera: Conjunctivae normal.   Neck:      Musculoskeletal: Normal range of motion and neck supple. Cardiovascular:      Rate and Rhythm: Normal rate and regular rhythm. Heart sounds: Normal heart sounds. No murmur. Pulmonary:      Effort: Pulmonary effort is normal.      Breath sounds: Normal breath sounds. Abdominal:      General: Bowel sounds are normal. There is no distension. Palpations: Abdomen is soft. Musculoskeletal: Normal range of motion. Skin:     Findings: No erythema. Neurological:      Mental Status: She is alert and oriented to person, place, and time. Psychiatric:         Behavior: Behavior normal.         ASSESSMENT and PLAN  Diagnoses and all orders for this visit:    1. Obesity (BMI 30.0-34.9)  -     phentermine (ADIPEX-P) 37.5 mg tablet; Take 1 Tab by mouth every morning. Max Daily Amount: 37.5 mg.  -     phentermine (ADIPEX-P) 37.5 mg tablet; Take 1 Tab by mouth every morning. Max Daily Amount: 37.5 mg.     the appropriate diet discussed. lifelong compliance to reduce risk is stressed. A regular exercise program,  maintain normal body weight, fitness,  to avoid fast food Advised, recheck for in 3 months rtc fasting, meds side effect and compliance advised.

## 2019-12-12 NOTE — PROGRESS NOTES
Name and  verified      Chief Complaint   Patient presents with    Blood Pressure Check    Foot Pain     Left (Patient denies injury)         Health Maintenance reviewed-discussed with patient. 1. Have you been to the ER, urgent care clinic since your last visit? Hospitalized since your last visit? Yes, Childbirth 2019.    2. Have you seen or consulted any other health care providers outside of the [address] [place] [address] since your last visit? Include any pap smears or colon screening.  no

## 2020-03-27 ENCOUNTER — TELEPHONE (OUTPATIENT)
Dept: FAMILY MEDICINE CLINIC | Age: 35
End: 2020-03-27

## 2020-03-27 NOTE — TELEPHONE ENCOUNTER
Patient phoned and screen for COVID-19 due to upcoming appointment-nausea, vomiting, diarrhea, SOB, sore throat, cough, fever, chill, body aches loss of taste/smell  and traveling patient denies.

## 2020-08-27 ENCOUNTER — APPOINTMENT (OUTPATIENT)
Dept: CT IMAGING | Age: 35
End: 2020-08-27
Attending: EMERGENCY MEDICINE

## 2020-08-27 ENCOUNTER — HOSPITAL ENCOUNTER (EMERGENCY)
Age: 35
Discharge: HOME | End: 2020-08-27
Attending: EMERGENCY MEDICINE

## 2020-08-27 ENCOUNTER — APPOINTMENT (OUTPATIENT)
Dept: GENERAL RADIOLOGY | Age: 35
End: 2020-08-27
Attending: EMERGENCY MEDICINE

## 2020-08-27 VITALS
WEIGHT: 194.22 LBS | OXYGEN SATURATION: 100 % | BODY MASS INDEX: 29.44 KG/M2 | RESPIRATION RATE: 17 BRPM | HEIGHT: 68 IN | HEART RATE: 78 BPM | DIASTOLIC BLOOD PRESSURE: 105 MMHG | SYSTOLIC BLOOD PRESSURE: 168 MMHG | TEMPERATURE: 98.4 F

## 2020-08-27 DIAGNOSIS — R03.0 ELEVATED BLOOD PRESSURE READING: ICD-10-CM

## 2020-08-27 DIAGNOSIS — R20.0 LEFT FACIAL NUMBNESS: Primary | ICD-10-CM

## 2020-08-27 DIAGNOSIS — F41.1 ANXIETY STATE: ICD-10-CM

## 2020-08-27 LAB
ALBUMIN SERPL-MCNC: 3.4 G/DL (ref 3.5–5)
ALBUMIN/GLOB SERPL: 0.9 {RATIO} (ref 1.1–2.2)
ALP SERPL-CCNC: 67 U/L (ref 45–117)
ALT SERPL-CCNC: 35 U/L (ref 12–78)
ANION GAP SERPL CALC-SCNC: 1 MMOL/L (ref 5–15)
AST SERPL-CCNC: 34 U/L (ref 15–37)
BASOPHILS # BLD: 0.1 K/UL (ref 0–0.1)
BASOPHILS NFR BLD: 2 % (ref 0–1)
BILIRUB SERPL-MCNC: 0.3 MG/DL (ref 0.2–1)
BUN SERPL-MCNC: 9 MG/DL (ref 6–20)
BUN/CREAT SERPL: 12 (ref 12–20)
CALCIUM SERPL-MCNC: 8.6 MG/DL (ref 8.5–10.1)
CHLORIDE SERPL-SCNC: 104 MMOL/L (ref 97–108)
CO2 SERPL-SCNC: 32 MMOL/L (ref 21–32)
CREAT SERPL-MCNC: 0.77 MG/DL (ref 0.55–1.02)
DIFFERENTIAL METHOD BLD: ABNORMAL
EOSINOPHIL # BLD: 0.1 K/UL (ref 0–0.4)
EOSINOPHIL NFR BLD: 1 % (ref 0–7)
ERYTHROCYTE [DISTWIDTH] IN BLOOD BY AUTOMATED COUNT: 13.4 % (ref 11.5–14.5)
GLOBULIN SER CALC-MCNC: 4 G/DL (ref 2–4)
GLUCOSE SERPL-MCNC: 102 MG/DL (ref 65–100)
HCT VFR BLD AUTO: 37.3 % (ref 35–47)
HGB BLD-MCNC: 12.2 G/DL (ref 11.5–16)
IMM GRANULOCYTES # BLD AUTO: 0 K/UL (ref 0–0.04)
IMM GRANULOCYTES NFR BLD AUTO: 0 % (ref 0–0.5)
LYMPHOCYTES # BLD: 2.2 K/UL (ref 0.8–3.5)
LYMPHOCYTES NFR BLD: 33 % (ref 12–49)
MAGNESIUM SERPL-MCNC: 1.9 MG/DL (ref 1.6–2.4)
MCH RBC QN AUTO: 27.5 PG (ref 26–34)
MCHC RBC AUTO-ENTMCNC: 32.7 G/DL (ref 30–36.5)
MCV RBC AUTO: 84 FL (ref 80–99)
METAMYELOCYTES NFR BLD MANUAL: 1 %
MONOCYTES # BLD: 0.4 K/UL (ref 0–1)
MONOCYTES NFR BLD: 6 % (ref 5–13)
NEUTS SEG # BLD: 3.8 K/UL (ref 1.8–8)
NEUTS SEG NFR BLD: 57 % (ref 32–75)
NRBC # BLD: 0 K/UL (ref 0–0.01)
NRBC BLD-RTO: 0 PER 100 WBC
PLATELET # BLD AUTO: 306 K/UL (ref 150–400)
PMV BLD AUTO: 11.6 FL (ref 8.9–12.9)
POTASSIUM SERPL-SCNC: 3.7 MMOL/L (ref 3.5–5.1)
PROT SERPL-MCNC: 7.4 G/DL (ref 6.4–8.2)
RBC # BLD AUTO: 4.44 M/UL (ref 3.8–5.2)
RBC MORPH BLD: ABNORMAL
SODIUM SERPL-SCNC: 137 MMOL/L (ref 136–145)
TROPONIN I SERPL-MCNC: <0.05 NG/ML
WBC # BLD AUTO: 6.7 K/UL (ref 3.6–11)
WBC MORPH BLD: ABNORMAL

## 2020-08-27 PROCEDURE — 36415 COLL VENOUS BLD VENIPUNCTURE: CPT

## 2020-08-27 PROCEDURE — 80053 COMPREHEN METABOLIC PANEL: CPT

## 2020-08-27 PROCEDURE — 99283 EMERGENCY DEPT VISIT LOW MDM: CPT

## 2020-08-27 PROCEDURE — 84484 ASSAY OF TROPONIN QUANT: CPT

## 2020-08-27 PROCEDURE — 74011250637 HC RX REV CODE- 250/637: Performed by: EMERGENCY MEDICINE

## 2020-08-27 PROCEDURE — 85025 COMPLETE CBC W/AUTO DIFF WBC: CPT

## 2020-08-27 PROCEDURE — 83735 ASSAY OF MAGNESIUM: CPT

## 2020-08-27 RX ORDER — HYDROXYZINE 25 MG/1
25 TABLET, FILM COATED ORAL
Qty: 20 TAB | Refills: 0 | Status: SHIPPED | OUTPATIENT
Start: 2020-08-27 | End: 2020-09-06

## 2020-08-27 RX ORDER — AMLODIPINE BESYLATE 2.5 MG/1
2.5 TABLET ORAL DAILY
Qty: 30 TAB | Refills: 0 | Status: SHIPPED | OUTPATIENT
Start: 2020-08-27 | End: 2021-05-03

## 2020-08-27 RX ORDER — AMLODIPINE BESYLATE 2.5 MG/1
2.5 TABLET ORAL
Status: COMPLETED | OUTPATIENT
Start: 2020-08-27 | End: 2020-08-27

## 2020-08-27 RX ADMIN — AMLODIPINE BESYLATE 2.5 MG: 2.5 TABLET ORAL at 08:44

## 2020-08-27 NOTE — LETTER
[address] 
[facility] EMERGENCY DEPT 
[address] [place] [address] [place] [id number] 
[phone] Work/School Note Date: 8/27/2020 To Whom It May concern: 
 
Luzmaria Bentley was seen and treated today in the emergency room by the following provider(s): 
Attending Provider: Libia Camacho MD. Luzmaria Bentley may return to work on 8/31/2020. Sincerely, Vanna Dupont MD

## 2020-08-27 NOTE — DISCHARGE INSTRUCTIONS
Patient Education        Anxiety Disorder: Care Instructions  Your Care Instructions     Anxiety is a normal reaction to stress. Difficult situations can cause you to have symptoms such as sweaty palms and a nervous feeling. In an anxiety disorder, the symptoms are far more severe. Constant worry, muscle tension, trouble sleeping, nausea and diarrhea, and other symptoms can make normal daily activities difficult or impossible. These symptoms may occur for no reason, and they can affect your work, school, or social life. Medicines, counseling, and self-care can all help. Follow-up care is a key part of your treatment and safety. Be sure to make and go to all appointments, and call your doctor if you are having problems. It's also a good idea to know your test results and keep a list of the medicines you take. How can you care for yourself at home? · Take medicines exactly as directed. Call your doctor if you think you are having a problem with your medicine. · Go to your counseling sessions and follow-up appointments. · Recognize and accept your anxiety. Then, when you are in a situation that makes you anxious, say to yourself, \"This is not an emergency. I feel uncomfortable, but I am not in danger. I can keep going even if I feel anxious. \"  · Be kind to your body:  ? Relieve tension with exercise or a massage. ? Get enough rest.  ? Avoid alcohol, caffeine, nicotine, and illegal drugs. They can increase your anxiety level and cause sleep problems. ? Learn and do relaxation techniques. See below for more about these techniques. · Engage your mind. Get out and do something you enjoy. Go to a funny movie, or take a walk or hike. Plan your day. Having too much or too little to do can make you anxious. · Keep a record of your symptoms. Discuss your fears with a good friend or family member, or join a support group for people with similar problems. Talking to others sometimes relieves stress.   · Get involved in social groups, or volunteer to help others. Being alone sometimes makes things seem worse than they are. · Get at least 30 minutes of exercise on most days of the week to relieve stress. Walking is a good choice. You also may want to do other activities, such as running, swimming, cycling, or playing tennis or team sports. Relaxation techniques  Do relaxation exercises 10 to 20 minutes a day. You can play soothing, relaxing music while you do them, if you wish. · Tell others in your house that you are going to do your relaxation exercises. Ask them not to disturb you. · Find a comfortable place, away from all distractions and noise. · Lie down on your back, or sit with your back straight. · Focus on your breathing. Make it slow and steady. · Breathe in through your nose. Breathe out through either your nose or mouth. · Breathe deeply, filling up the area between your navel and your rib cage. Breathe so that your belly goes up and down. · Do not hold your breath. · Breathe like this for 5 to 10 minutes. Notice the feeling of calmness throughout your whole body. As you continue to breathe slowly and deeply, relax by doing the following for another 5 to 10 minutes:  · Tighten and relax each muscle group in your body. You can begin at your toes and work your way up to your head. · Imagine your muscle groups relaxing and becoming heavy. · Empty your mind of all thoughts. · Let yourself relax more and more deeply. · Become aware of the state of calmness that surrounds you. · When your relaxation time is over, you can bring yourself back to alertness by moving your fingers and toes and then your hands and feet and then stretching and moving your entire body. Sometimes people fall asleep during relaxation, but they usually wake up shortly afterward. · Always give yourself time to return to full alertness before you drive a car or do anything that might cause an accident if you are not fully alert.  Never play a relaxation tape while you drive a car. When should you call for help? APNY648 anytime you think you may need emergency care. For example, call if:  · You feel you cannot stop from hurting yourself or someone else. Keep the numbers for these national suicide hotlines: 1-310-907-TALK (8-401.453.1244) and 0-740-BHKGVOY (5-857.595.7644). If you or someone you know talks about suicide or feeling hopeless, get help right away. Watch closely for changes in your health, and be sure to contact your doctor if:  · You have anxiety or fear that affects your life. · You have symptoms of anxiety that are new or different from those you had before. Where can you learn more? Go to http://caleb3D FUTURE VISION IIbassam.info/  Enter P754 in the search box to learn more about \"Anxiety Disorder: Care Instructions. \"  Current as of: January 31, 2020               Content Version: 12.5  © 2006-2020 [employer]. Care instructions adapted under license by [employer] (which disclaims liability or warranty for this information). If you have questions about a medical condition or this instruction, always ask your healthcare professional. [place] [address] any warranty or liability for your use of this information. Patient Education        Elevated Blood Pressure: Care Instructions  Your Care Instructions    Blood pressure is a measure of how hard the blood pushes against the walls of your arteries. It's normal for blood pressure to go up and down throughout the day. But if it stays up over time, you have high blood pressure. Two numbers tell you your blood pressure. The first number is the systolic pressure. It shows how hard the blood pushes when your heart is pumping. The second number is the diastolic pressure. It shows how hard the blood pushes between heartbeats, when your heart is relaxed and filling with blood.  An ideal blood pressure in adults is less than 120/80 (say \"120 over 80\"). High blood pressure is 140/90 or higher. You have high blood pressure if your top number is 140 or higher or your bottom number is 90 or higher, or both. The main test for high blood pressure is simple, fast, and painless. To diagnose high blood pressure, your doctor will test your blood pressure at different times. After testing your blood pressure, your doctor may ask you to test it again when you are home. If you are diagnosed with high blood pressure, you can work with your doctor to make a long-term plan to manage it. Follow-up care is a key part of your treatment and safety. Be sure to make and go to all appointments, and call your doctor if you are having problems. It's also a good idea to know your test results and keep a list of the medicines you take. How can you care for yourself at home? · Do not smoke. Smoking increases your risk for heart attack and stroke. If you need help quitting, talk to your doctor about stop-smoking programs and medicines. These can increase your chances of quitting for good. · Stay at a healthy weight. · Try to limit how much sodium you eat to less than 2,300 milligrams (mg) a day. Your doctor may ask you to try to eat less than 1,500 mg a day. · Be physically active. Get at least 30 minutes of exercise on most days of the week. Walking is a good choice. You also may want to do other activities, such as running, swimming, cycling, or playing tennis or team sports. · Avoid or limit alcohol. Talk to your doctor about whether you can drink any alcohol. · Eat plenty of fruits, vegetables, and low-fat dairy products. Eat less saturated and total fats. · Learn how to check your blood pressure at home. When should you call for help? Call your doctor now or seek immediate medical care if:  ? · Your blood pressure is much higher than normal (such as 180/110 or higher).    ? · You think high blood pressure is causing symptoms such as:  ¨ Severe headache. ¨ Blurry vision. ? Watch closely for changes in your health, and be sure to contact your doctor if:  ? · You do not get better as expected. Where can you learn more? Go to http://www.gray.com/. Enter I485 in the search box to learn more about \"Elevated Blood Pressure: Care Instructions. \"  Current as of: September 21, 2016  Content Version: 11.4  © 9218-9119 [employer]. Care instructions adapted under license by [employer] (which disclaims liability or warranty for this information). If you have questions about a medical condition or this instruction, always ask your healthcare professional. [address] any warranty or liability for your use of this information. Patient Education        Numbness and Tingling: Care Instructions  Your Care Instructions     Many things can cause numbness or tingling. Swelling may put pressure on a nerve. This could cause you to lose feeling or have a pins-and-needles sensation on part of your body. Nerves may be damaged from trauma, toxins, or diseases, such as diabetes or multiple sclerosis (MS). Sometimes, though, the cause is not clear. If there is no clear reason for your symptoms, and you are not having any other symptoms, your doctor may suggest watching and waiting for a while to see if the numbness or tingling goes away on its own. Your doctor may want you to have blood or nerve tests to find the cause of your symptoms. Follow-up care is a key part of your treatment and safety. Be sure to make and go to all appointments, and call your doctor if you are having problems. It's also a good idea to know your test results and keep a list of the medicines you take. How can you care for yourself at home? · If your doctor prescribes medicine, take it exactly as directed. Call your doctor if you think you are having a problem with your medicine.   · If you have any swelling, put ice or a cold pack on the area for 10 to 20 minutes at a time. Put a thin cloth between the ice and your skin. When should you call for help? [id number] anytime you think you may need emergency care. For example, call if:  · You have weakness, numbness, or tingling in both legs. · You lose bowel or bladder control. · You have symptoms of a stroke. These may include:  ? Sudden numbness, tingling, weakness, or loss of movement in your face, arm, or leg, especially on only one side of your body. ? Sudden vision changes. ? Sudden trouble speaking. ? Sudden confusion or trouble understanding simple statements. ? Sudden problems with walking or balance. ? A sudden, severe headache that is different from past headaches. Watch closely for changes in your health, and be sure to contact your doctor if you have any problems, or if:  · You do not get better as expected. Where can you learn more? Go to http://caleb-bassam.info/  Enter U128 in the search box to learn more about \"Numbness and Tingling: Care Instructions. \"  Current as of: November 20, 2019               Content Version: 12.5  © 4078-7490 [employer]. Care instructions adapted under license by [employer] (which disclaims liability or warranty for this information). If you have questions about a medical condition or this instruction, always ask your healthcare professional. [address] any warranty or liability for your use of this information.

## 2020-08-27 NOTE — ED PROVIDER NOTES
EMERGENCY DEPARTMENT HISTORY AND PHYSICAL EXAM      Date: 8/27/2020  Patient Name: Bruno Sesay    History of Presenting Illness     Chief Complaint   Patient presents with    Numbness     Pt reports left sided facial numbness intermittent for several weeks. She also states she \"feels weird. \" but isn't able to explain it       History Provided By: Patient    HPI: Bruno Sesay, 27 y.o. female presents to the ED with cc of numbness. Patient states that she has had intermittent left facial numbness for 2 weeks. Symptoms only last for seconds to minutes. She denies having left arm or left leg numbness with the facial numbness, but states occasionally her left pinky has felt numb. She denies headache, chest pain, fever or cough. She has had intermittent shortness of breath for the last few days. She thought this might be due to wearing a mask. She has had chills intermittently. She denies abdominal pain or back pain. She denies heavy menstrual cycles, vomiting or diarrhea. There are no other complaints, changes, or physical findings at this time. PCP: Winona Angelucci, MD    No current facility-administered medications on file prior to encounter. Current Outpatient Medications on File Prior to Encounter   Medication Sig Dispense Refill    phentermine (ADIPEX-P) 37.5 mg tablet Take 1 Tab by mouth every morning. Max Daily Amount: 37.5 mg. 30 Tab 0    phentermine (ADIPEX-P) 37.5 mg tablet Take 1 Tab by mouth every morning.  Max Daily Amount: 37.5 mg. 30 Tab 0       Past History     Past Medical History:  Past Medical History:   Diagnosis Date    Abnormal Papanicolaou smear of cervix     follow-up normal    ADHD (attention deficit hyperactivity disorder), combined type 6/2/2016    Bipolar 2 disorder ([facility] [address] 10/25/2016    Chlamydia infection 1/28/2014    Chronic fatigue 1/23/2018    Concentric fading 1/23/2018    Depressed 5/8/2015    Diabetes ([facility] [address]     patient states pre-diabetes    Disability examination 2015    Encounter for completion of form with patient 2016    Exposure to sexually transmitted disease (STD) 12/3/2014    Fatigue 2014    Genital herpes     no current outbreaks    Hypertension     During pregnacy    Intractable migraine with aura with status migrainosus 11/10/2016    Obesity (BMI 30.0-34. 9) 2014    Poor concentration 2014       Past Surgical History:  Past Surgical History:   Procedure Laterality Date    HX APPENDECTOMY  13    Laparoscopic Appendectomy    HX  SECTION      ,     HX DILATION AND CURETTAGE         Family History:  Family History   Problem Relation Age of Onset    Cancer Mother     Neuropathy Mother     Stroke Mother        Social History:  Social History     Tobacco Use    Smoking status: Never Smoker    Smokeless tobacco: Never Used   Substance Use Topics    Alcohol use: No    Drug use: No       Allergies:  No Known Allergies      Review of Systems   Review of Systems   Constitutional: Positive for chills. HENT: Negative for congestion. Eyes: Negative. Respiratory: Positive for shortness of breath. Cardiovascular: Negative for chest pain. Gastrointestinal: Negative for abdominal pain. Endocrine: Negative for heat intolerance. Genitourinary: Negative. Musculoskeletal: Negative for back pain. Skin: Negative for rash. Allergic/Immunologic: Negative for immunocompromised state. Neurological: Positive for numbness. Hematological: Does not bruise/bleed easily. Psychiatric/Behavioral: Negative. All other systems reviewed and are negative. Physical Exam   Physical Exam  Vitals signs and nursing note reviewed. Constitutional:       General: She is not in acute distress. Appearance: She is well-developed. HENT:      Head: Normocephalic. Eyes:      Extraocular Movements: Extraocular movements intact. Pupils: Pupils are equal, round, and reactive to light. Neck:      Musculoskeletal: Normal range of motion and neck supple. Cardiovascular:      Rate and Rhythm: Normal rate and regular rhythm. Heart sounds: Normal heart sounds. Pulmonary:      Effort: Pulmonary effort is normal.      Breath sounds: Normal breath sounds. Abdominal:      General: Bowel sounds are normal.      Palpations: Abdomen is soft. Tenderness: There is no abdominal tenderness. Musculoskeletal: Normal range of motion. Skin:     General: Skin is warm and dry. Neurological:      General: No focal deficit present. Mental Status: She is alert and oriented to person, place, and time. Psychiatric:         Mood and Affect: Mood normal.         Behavior: Behavior normal.         Diagnostic Study Results     Labs -     Recent Results (from the past 12 hour(s))   CBC WITH AUTOMATED DIFF    Collection Time: 08/27/20  7:36 AM   Result Value Ref Range    WBC 6.7 3.6 - 11.0 K/uL    RBC 4.44 3.80 - 5.20 M/uL    HGB 12.2 11.5 - 16.0 g/dL    HCT 37.3 35.0 - 47.0 %    MCV 84.0 80.0 - 99.0 FL    MCH 27.5 26.0 - 34.0 PG    MCHC 32.7 30.0 - 36.5 g/dL    RDW 13.4 11.5 - 14.5 %    PLATELET 305 804 - 131 K/uL    MPV 11.6 8.9 - 12.9 FL    NRBC 0.0 0  WBC    ABSOLUTE NRBC 0.00 0.00 - 0.01 K/uL    NEUTROPHILS 57 32 - 75 %    LYMPHOCYTES 33 12 - 49 %    MONOCYTES 6 5 - 13 %    EOSINOPHILS 1 0 - 7 %    BASOPHILS 2 (H) 0 - 1 %    METAMYELOCYTES 1 %    IMMATURE GRANULOCYTES 0 0.0 - 0.5 %    ABS. NEUTROPHILS 3.8 1.8 - 8.0 K/UL    ABS. LYMPHOCYTES 2.2 0.8 - 3.5 K/UL    ABS. MONOCYTES 0.4 0.0 - 1.0 K/UL    ABS. EOSINOPHILS 0.1 0.0 - 0.4 K/UL    ABS. BASOPHILS 0.1 0.0 - 0.1 K/UL    ABS. IMM.  GRANS. 0.0 0.00 - 0.04 K/UL    DF SMEAR SCANNED      RBC COMMENTS NORMOCYTIC, NORMOCHROMIC      WBC COMMENTS SMUDGE CELLS SEEN     METABOLIC PANEL, COMPREHENSIVE    Collection Time: 08/27/20  7:36 AM   Result Value Ref Range    Sodium 137 136 - 145 mmol/L    Potassium 3.7 3.5 - 5.1 mmol/L    Chloride 104 97 - 108 mmol/L    CO2 32 21 - 32 mmol/L    Anion gap 1 (L) 5 - 15 mmol/L    Glucose 102 (H) 65 - 100 mg/dL    BUN 9 6 - 20 MG/DL    Creatinine 0.77 0.55 - 1.02 MG/DL    BUN/Creatinine ratio 12 12 - 20      GFR est AA >60 >60 ml/min/1.73m2    GFR est non-AA >60 >60 ml/min/1.73m2    Calcium 8.6 8.5 - 10.1 MG/DL    Bilirubin, total 0.3 0.2 - 1.0 MG/DL    ALT (SGPT) 35 12 - 78 U/L    AST (SGOT) 34 15 - 37 U/L    Alk. phosphatase 67 45 - 117 U/L    Protein, total 7.4 6.4 - 8.2 g/dL    Albumin 3.4 (L) 3.5 - 5.0 g/dL    Globulin 4.0 2.0 - 4.0 g/dL    A-G Ratio 0.9 (L) 1.1 - 2.2     MAGNESIUM    Collection Time: 08/27/20  7:36 AM   Result Value Ref Range    Magnesium 1.9 1.6 - 2.4 mg/dL   TROPONIN I    Collection Time: 08/27/20  7:36 AM   Result Value Ref Range    Troponin-I, Qt. <0.05 <0.05 ng/mL       Radiologic Studies -   XR CHEST PORT    (Results Pending)   CT HEAD WO CONT    (Results Pending)     CT Results  (Last 48 hours)    None        CXR Results  (Last 48 hours)    None          Medical Decision Making   I am the first provider for this patient. I reviewed the vital signs, available nursing notes, past medical history, past surgical history, family history and social history. Vital Signs-Reviewed the patient's vital signs. Patient Vitals for the past 12 hrs:   Temp Pulse Resp BP SpO2   08/27/20 0640 98.4 °F (36.9 °C) 78 17 (!) 175/105 100 %           Records Reviewed: Nursing Notes, Old Medical Records, Previous Radiology Studies and Previous Laboratory Studies    Provider Notes (Medical Decision Making):   Electrolyte abnormality, TIA, anemia, anxiety,    ED Course:   Initial assessment performed. The patients presenting problems have been discussed, and they are in agreement with the care plan formulated and outlined with them. I have encouraged them to ask questions as they arise throughout their visit.     Progress note:    Patient declined her CT and chest x-ray, because her insurance does not kick in until next week. She also would like to be placed on blood pressure medicine, and she believes her shortness of breath is due to anxiety. She does have a history of anxiety, and she has requested some medication for that. She is advised to follow-up and return to ER if worse           Critical Care Time:   0    Disposition:  home    DISCHARGE PLAN:  1. Discharge Medication List as of 8/27/2020  8:27 AM      START taking these medications    Details   hydrOXYzine HCL (ATARAX) 25 mg tablet Take 1 Tab by mouth every six (6) hours as needed for Anxiety for up to 10 days. , Normal, Disp-20 Tab,R-0      amLODIPine (Norvasc) 2.5 mg tablet Take 1 Tab by mouth daily. , Normal, Disp-30 Tab,R-0         CONTINUE these medications which have NOT CHANGED    Details   ! ! phentermine (ADIPEX-P) 37.5 mg tablet Take 1 Tab by mouth every morning. Max Daily Amount: 37.5 mg., Print, Disp-30 Tab, R-0      !! phentermine (ADIPEX-P) 37.5 mg tablet Take 1 Tab by mouth every morning. Max Daily Amount: 37.5 mg., Print, Disp-30 Tab, R-0       !! - Potential duplicate medications found. Please discuss with provider. 2.   Follow-up Information     Follow up With Specialties Details Why Contact Info    Elvin Mendez MD Family Medicine Call in 1 day Make an appointment for blood pressure recheck next week [address]       [facility] EMERGENCY DEPT Emergency Medicine  If symptoms worsen [address] [facility] [address]   [address] [place] [address]    Es Aguero MD Neurology  As needed [address]  [address] [facility]  [address] [facility] [address]  [phone]          3. Return to ED if worse     Diagnosis     Clinical Impression:   1. Left facial numbness    2. Elevated blood pressure reading    3. Anxiety state        Attestations:    Radha Freire MD    Please note that this dictation was completed with [employer], the [employer] voice recognition software.   Quite often unanticipated grammatical, syntax, homophones, and other interpretive errors are inadvertently transcribed by the computer software. Please disregard these errors. Please excuse any errors that have escaped final proofreading. Thank you.

## 2021-03-29 ENCOUNTER — VIRTUAL VISIT (OUTPATIENT)
Dept: FAMILY MEDICINE CLINIC | Age: 36
End: 2021-03-29
Payer: COMMERCIAL

## 2021-03-29 DIAGNOSIS — E78.00 HYPERCHOLESTEREMIA: ICD-10-CM

## 2021-03-29 DIAGNOSIS — Z83.3 FAMILY HISTORY OF DIABETES MELLITUS IN MOTHER: ICD-10-CM

## 2021-03-29 DIAGNOSIS — R53.83 OTHER FATIGUE: ICD-10-CM

## 2021-03-29 DIAGNOSIS — R53.83 LOW ENERGY: ICD-10-CM

## 2021-03-29 DIAGNOSIS — E66.9 OBESITY (BMI 30.0-34.9): ICD-10-CM

## 2021-03-29 DIAGNOSIS — Z00.00 WELL WOMAN EXAM (NO GYNECOLOGICAL EXAM): Primary | ICD-10-CM

## 2021-03-29 DIAGNOSIS — Z23 ENCOUNTER FOR IMMUNIZATION: ICD-10-CM

## 2021-03-29 DIAGNOSIS — Z71.89 ADVICE GIVEN ABOUT COVID-19 VIRUS INFECTION: ICD-10-CM

## 2021-03-29 DIAGNOSIS — G56.02 CARPAL TUNNEL SYNDROME OF LEFT WRIST: ICD-10-CM

## 2021-03-29 PROCEDURE — 99213 OFFICE O/P EST LOW 20 MIN: CPT | Performed by: FAMILY MEDICINE

## 2021-03-29 PROCEDURE — 99395 PREV VISIT EST AGE 18-39: CPT | Performed by: FAMILY MEDICINE

## 2021-03-29 RX ORDER — PHENTERMINE HYDROCHLORIDE 37.5 MG/1
37.5 TABLET ORAL
Qty: 30 TAB | Refills: 0 | Status: SHIPPED | OUTPATIENT
Start: 2021-03-29 | End: 2021-12-10 | Stop reason: ALTCHOICE

## 2021-03-29 NOTE — PROGRESS NOTES
Chief Complaint   Patient presents with    Complete Physical     1. Have you been to the ER, urgent care clinic since your last visit? Hospitalized since your last visit? No    2. Have you seen or consulted any other health care providers outside of the [address] [place] [address] since your last visit? Include any pap smears or colon screening. No    Call placed to pt. Verified patient with two type of identifiers.    Requesting assistance with weight loss

## 2021-03-29 NOTE — PATIENT INSTRUCTIONS
Learning About Cervical Cancer Screening What is a cervical cancer screening test? 
  
The cervix is the lower part of the uterus that opens into the vagina. Cervical cancer screening tests check the cells on the cervix for changes that could lead to cancer. Two tests can be used to screen for cervical cancer. They may be used alone or together. A Pap test.  
This test looks for changes in the cells of the cervix. Some of these cell changes could lead to cancer. A human papillomavirus (HPV) test.  
This test looks for the HPV virus. Some high-risk types of HPV can cause cell changes that could lead to cervical cancer. The HPV test may be used with the Pap test in women ages 27 and older. When should you have a screening test? 
If you have a cervix and are at average risk for cervical cancer, your doctor will likely suggest starting screening at age 24. Ages 24 to 34 If you're in this age group, your doctor will likely suggest that you get a Pap test. If your Pap test results are normal, you can wait 3 years to have another test. 
Ages 27 to 59 Screening options for this age group may include: · A Pap test. If your results are normal, you can wait 3 years to have another test. 
· An HPV test. If your results are normal, you can wait 5 years to have another test. 
· A Pap test and an HPV test. If your results are normal, you can wait 5 years to be tested again. Ages 72 and older If you are age 72 or older, you may no longer need this screening test. Talk to your doctor. What do the results of cervical cancer screening mean? Your test results may be normal. Or the results may show minor or serious changes to the cells on your cervix. Minor changes may go away on their own, especially if you are younger than 30. You may have an abnormal test because you have an infection of the vagina or cervix or because you have low estrogen levels after menopause that are causing the cells to change.  
If you have a high-risk type of human papillomavirus (HPV) or cell changes that could turn into cancer, you may need more tests. The next step may be a colposcopy or treatment to remove or destroy the abnormal cells. If you have a Pap test, an HPV test, or both, your doctor will recommend a follow-up plan based on your results and your age. Follow-up care is a key part of your treatment and safety. Be sure to make and go to all appointments, and call your doctor if you are having problems. It's also a good idea to know your test results and keep a list of the medicines you take. Where can you learn more? Go to http://www.ndiaye.com/ Enter P919 in the search box to learn more about \"Learning About Cervical Cancer Screening. \" Current as of: April 29, 2020               Content Version: 12.6 © 9620-8531 [employer]. Care instructions adapted under license by [employer] (which disclaims liability or warranty for this information). If you have questions about a medical condition or this instruction, always ask your healthcare professional. [address] any warranty or liability for your use of this information.

## 2021-03-29 NOTE — PROGRESS NOTES
Subjective:   38 y.o. female for Well Woman Check. Her gyne and breast care is done elsewhere by her Ob-Gyne physician. Patient able to drive no recent accident, Patient compare self health the same to others with the same age,   patient with normal hearing normal vision able to do all ADL currently working full-time, having had no fall and no incontinence having normal appetite no weight loss since last seen eating fruits and vegetables every day does not do exercises denies any substance abuse,     Unfortunately patient has couple other problems and concerns which were not included in annual wellness exam visit,     Last pap was many yrs ago nl reading  Last mammogram was also normal was last year  Last DEXA scan was never   Immunization not uptodate offered but opted        Current Outpatient Medications   Medication Sig Dispense Refill    phentermine (ADIPEX-P) 37.5 mg tablet Take 1 Tab by mouth every morning. Max Daily Amount: 37.5 mg. 30 Tab 0    amLODIPine (Norvasc) 2.5 mg tablet Take 1 Tab by mouth daily. 30 Tab 0     No Known Allergies  Past Medical History:   Diagnosis Date    Abnormal Papanicolaou smear of cervix     follow-up normal    ADHD (attention deficit hyperactivity disorder), combined type 6/2/2016    Bipolar 2 disorder ([facility] [address] 10/25/2016    Chlamydia infection 1/28/2014    Chronic fatigue 1/23/2018    Concentric fading 1/23/2018    Depressed 5/8/2015    Diabetes ([facility] [address]     patient states pre-diabetes    Disability examination 4/29/2015    Encounter for completion of form with patient 7/13/2016    Exposure to sexually transmitted disease (STD) 12/3/2014    Fatigue 8/13/2014    Genital herpes     no current outbreaks    Hypertension     During pregnacy    Intractable migraine with aura with status migrainosus 11/10/2016    Obesity (BMI 30.0-34. 9) 8/13/2014    Poor concentration 8/13/2014     Past Surgical History:   Procedure Laterality Date    HX APPENDECTOMY  2/12/13 Laparoscopic Appendectomy    HX  SECTION      ,     HX DILATION AND CURETTAGE       Family History   Problem Relation Age of Onset    Cancer Mother     Neuropathy Mother     Stroke Mother      Social History     Tobacco Use    Smoking status: Never Smoker    Smokeless tobacco: Never Used   Substance Use Topics    Alcohol use: No        Lab Results   Component Value Date/Time    Hemoglobin A1c 6.2 (H) 2014 05:52 PM    Hemoglobin A1c 6.2 (H) 2014 01:21 PM    Glucose 102 (H) 2020 07:36 AM    Glucose (POC) 119 (H) 10/20/2016 10:56 AM    LDL, calculated 102 (H) 2018 10:51 AM    Creatinine (POC) 0.9 2010 11:22 AM    Creatinine 0.77 2020 07:36 AM      Lab Results   Component Value Date/Time    Cholesterol, total 169 2018 10:51 AM    HDL Cholesterol 33 (L) 2018 10:51 AM    LDL, calculated 102 (H) 2018 10:51 AM    Triglyceride 168 (H) 2018 10:51 AM        ROS: Feeling generally well. No TIA's or unusual headaches, no dysphagia. No prolonged cough. No dyspnea or chest pain on exertion. No abdominal pain, change in bowel habits, black or bloody stools. No urinary tract symptoms. No new or unusual musculoskeletal symptoms.     Specific concerns today:     Obesity  The pt is feeling very good on this meds, feeling less tired and fatigued, becoming to be more concentrated at work and at home, getting along very well with family member and the work place, in addition the pt is becoming to be more active and having daily multiple healthy different diets,  Is more involved with the weekly routine exercises, not a fast fooder, states that the pt does not eat too much as used to do and the portion are very well controlled, likes very much to continue on this medication despite knowing that it is not a safe meds, and  needs to be monitored q3 months and if any thing of unusual, the pt was told if any needs to call us and let us know of any  concern regarding the current meds, hopefully has not had any concern so far,       Wrist Pain    The history is provided by the patient. This is a recurrent problem. The current episode started more than few months ago. The problem occurs constantly. The problem has not changed since onset. The pain is present in the left wrist. The quality of the pain is described as dull. The pain is at a severity of 0/10. Associated symptoms include numbness, limited range of motion, stiffness, tingling and itching. The symptoms are aggravated by movement. There has been no history of extremity trauma (but alot of office work/typing). no phx of /no Family history for rheumatoid arthritis and gout. Objective: The patient appears well, alert, oriented x 3, in no distress. There were no vitals taken for this visit. ENT normal.  Neck supple. No adenopathy or thyromegaly. JAZMYNE. Lungs are clear, good air entry, no wheezes, rhonchi or rales. S1 and S2 normal, no murmurs, regular rate and rhythm. Abdomen soft without tenderness, guarding, mass or organomegaly. Extremities show no edema, normal peripheral pulses. Neurological is normal, no focal findings. Breast and Pelvic exams are deferred. Assessment/Plan:   Well Woman  lose weight, increase physical activity, limit alcohol consumption, follow low fat diet, follow low salt diet, continue present plan, routine labs ordered, call if any problems    Diagnoses and all orders for this visit:    1. Well woman exam (no gynecological exam)  Comments:  [V70.0]    2. Obesity (BMI 30.0-34.9)  -     phentermine (ADIPEX-P) 37.5 mg tablet; Take 1 Tab by mouth every morning. Max Daily Amount: 37.5 mg.  -     HEMOGLOBIN A1C WITH EAG; Future  -     METABOLIC PANEL, COMPREHENSIVE; Future  -     CBC W/O DIFF; Future  -     TSH 3RD GENERATION; Future  -     LIPID PANEL; Future    3.  Encounter for immunization  -     INFLUENZA VIRUS VACCINE, PRESERVATIVE FREE SYRINGE, 3 YRS AND OLDER  -     LIPID PANEL; Future    4. Hypercholesteremia  -     HEMOGLOBIN A1C WITH EAG; Future  -     METABOLIC PANEL, COMPREHENSIVE; Future  -     CBC W/O DIFF; Future  -     TSH 3RD GENERATION; Future  -     LIPID PANEL; Future    5. Family history of diabetes mellitus in mother  -     HEMOGLOBIN A1C WITH EAG; Future  -     METABOLIC PANEL, COMPREHENSIVE; Future  -     CBC W/O DIFF; Future  -     TSH 3RD GENERATION; Future  -     LIPID PANEL; Future    6. Carpal tunnel syndrome of left wrist  -     REFERRAL TO PHYSICAL THERAPY    7. Low energy  -     phentermine (ADIPEX-P) 37.5 mg tablet; Take 1 Tab by mouth every morning. Max Daily Amount: 37.5 mg.  -     TSH 3RD GENERATION; Future    8. Other fatigue  -     phentermine (ADIPEX-P) 37.5 mg tablet; Take 1 Tab by mouth every morning. Max Daily Amount: 37.5 mg.  -     HEMOGLOBIN A1C WITH EAG; Future  -     TSH 3RD GENERATION; Future    9.  Advice given about COVID-19 virus infection        Patient has no contraindication to use this medication no history of malabsorption syndrome patient has no history of eating disorder patient has tried various of weight loss treatment plan unfortunately failed nutritional counseling exercise regimen and calorie and fat restricted diet regardless patient was advised to continue with nutritional counseling exercise regimen calorie and fat restricted diet for the better outcome    Patient advised to have the mask on most of the time, social distance and handwashing avoid crowded area pursuant to the emergency declaration under the [address] and [address] authority and the [employer] Act, this Virtual Visit was conducted, with patient's consent, to reduce the patient's risk of exposure to COVID-19 and provide continuity of care for an established patient  Services were provided through a Video synchronous discussion virtually to substitute for in-person appointment.

## 2021-05-03 ENCOUNTER — VIRTUAL VISIT (OUTPATIENT)
Dept: FAMILY MEDICINE CLINIC | Age: 36
End: 2021-05-03
Payer: COMMERCIAL

## 2021-05-03 DIAGNOSIS — Z71.89 ADVICE GIVEN ABOUT COVID-19 VIRUS INFECTION: ICD-10-CM

## 2021-05-03 DIAGNOSIS — Z02.89 ENCOUNTER TO OBTAIN EXCUSE FROM WORK: ICD-10-CM

## 2021-05-03 DIAGNOSIS — F43.20 BEREAVEMENT REACTION: Primary | ICD-10-CM

## 2021-05-03 DIAGNOSIS — Z63.4 BEREAVEMENT REACTION: Primary | ICD-10-CM

## 2021-05-03 DIAGNOSIS — R11.2 INTRACTABLE VOMITING WITH NAUSEA, UNSPECIFIED VOMITING TYPE: ICD-10-CM

## 2021-05-03 PROCEDURE — 99213 OFFICE O/P EST LOW 20 MIN: CPT | Performed by: FAMILY MEDICINE

## 2021-05-03 SDOH — SOCIAL STABILITY - SOCIAL INSECURITY: DISSAPEARANCE AND DEATH OF FAMILY MEMBER: Z63.4

## 2021-05-03 NOTE — PROGRESS NOTES
HISTORY OF PRESENT ILLNESS  Alexandru Curran is a 38 y.o. female. Pt's main concerns were provided on virtual visit, a telemed format,  pt is w/ comorbid medical history and unaware of been exposed to covid-19 individual, Pt Have been staying at home for couple of wks,  pt has no fever no cough no dyspnea, no ha,     N/V/ no D and lost family member 4/26/2021 till 4/28/2021, lost the in law,    Started couple days no traveling, no recent hospital or nursing home visit, no party, no restaurant food++ vomitus, ++ nausea feeling, no watery stool, none bloody, no new meds, no family member with the same problem, with some abd cramps 4/10 today pain is 0 out of 10 patient denies any nausea vomiting stating that debridement getting better denies any suicidal homicidal ideation they are willing to go back to work patient states that she needs a letter for work    Current Outpatient Medications   Medication Sig Dispense Refill    phentermine (ADIPEX-P) 37.5 mg tablet Take 1 Tab by mouth every morning. Max Daily Amount: 37.5 mg. 30 Tab 0     No Known Allergies  Past Medical History:   Diagnosis Date    Abnormal Papanicolaou smear of cervix     follow-up normal    ADHD (attention deficit hyperactivity disorder), combined type 6/2/2016    Bipolar 2 disorder ([facility] 10/25/2016    Chlamydia infection 1/28/2014    Chronic fatigue 1/23/2018    Concentric fading 1/23/2018    Depressed 5/8/2015    Diabetes ([facility]     patient states pre-diabetes    Disability examination 4/29/2015    Encounter for completion of form with patient 7/13/2016    Exposure to sexually transmitted disease (STD) 12/3/2014    Fatigue 8/13/2014    Genital herpes     no current outbreaks    Hypertension     During pregnacy    Intractable migraine with aura with status migrainosus 11/10/2016    Obesity (BMI 30.0-34. 9) 8/13/2014    Poor concentration 8/13/2014     Past Surgical History:   Procedure Laterality Date    HX APPENDECTOMY  2/12/13 Laparoscopic Appendectomy    HX  SECTION      ,     HX DILATION AND CURETTAGE       Family History   Problem Relation Age of Onset    Cancer Mother     Neuropathy Mother     Stroke Mother      Social History     Tobacco Use    Smoking status: Never Smoker    Smokeless tobacco: Never Used   Substance Use Topics    Alcohol use: No      Lab Results   Component Value Date/Time    ALT (SGPT) 35 2020 07:36 AM    Alk. phosphatase 67 2020 07:36 AM    Bilirubin, total 0.3 2020 07:36 AM    Albumin 3.4 (L) 2020 07:36 AM    Protein, total 7.4 2020 07:36 AM    PLATELET 578  07:36 AM     Lab Results   Component Value Date/Time    TSH 2.920 2018 10:51 AM         Review of Systems   Constitutional: Negative for chills, fever and malaise/fatigue. HENT: Negative for nosebleeds. Eyes: Negative for pain. Respiratory: Negative for cough and wheezing. Cardiovascular: Negative for chest pain and leg swelling. Gastrointestinal: Negative for constipation, diarrhea and nausea. Genitourinary: Negative for frequency. Musculoskeletal: Negative for joint pain and myalgias. Skin: Negative for rash. Neurological: Negative for loss of consciousness and headaches. Endo/Heme/Allergies: Does not bruise/bleed easily. Psychiatric/Behavioral: Negative for depression. The patient is not nervous/anxious and does not have insomnia. All other systems reviewed and are negative. Physical Exam  Constitutional:       Appearance: She is not ill-appearing or toxic-appearing. HENT:      Head: Normocephalic and atraumatic. Mouth/Throat:      Mouth: Mucous membranes are moist.   Neurological:      Mental Status: She is alert and oriented to person, place, and time. Psychiatric:         Mood and Affect: Mood normal.         Behavior: Behavior normal.         Thought Content:  Thought content normal.         Judgment: Judgment normal.         ASSESSMENT and PLAN  Diagnoses and all orders for this visit:    1. Bereavement reaction    2. Intractable vomiting with nausea, unspecified vomiting type    3. Advice given about COVID-19 virus infection    4. Encounter to obtain excuse from work            Secondary to the patient acute medical conditions, a letter on the pt's behalf was completed, pt's multiple questions answered to the best knowledge,  will keep a copy in the chart for further correspondence, patient was informed about the safety and  regulations regarding this condition patient was also advised to follow-up with HR for further guidelines patient acknowledged understanding and agreed with today's recommendations    Patient advised to have the mask on most of the time, social distance and handwashing avoid crowded area pursuant to the emergency declaration under the [employer] [facility] waiver authority and the [employer] and [employer] Act, this Virtual Visit was conducted, with patient's consent, to reduce the patient's risk of exposure to COVID-19 and provide continuity of care for an established patient  Services were provided through a Video synchronous discussion virtually to substitute for in-person appointment.

## 2021-05-03 NOTE — PROGRESS NOTES
Chief Complaint   Patient presents with    Nausea     1. Have you been to the ER, urgent care clinic since your last visit? Hospitalized since your last visit? No    2. Have you seen or consulted any other health care providers outside of the [address] [place] [address] since your last visit? Include any pap smears or colon screening. No    verified patient with two type of identifiers.   c/o nausea and vomiting x 3 days last week, decreased appetite,  Was  Out of work,  Feeling better, needs letter to return to work

## 2021-05-03 NOTE — LETTER
NOTIFICATION RETURN TO WORK / SCHOOL 
 
 
 
 
5/3/2021 3:49 PM 
 
Ms. Jenny Marsh [address] [facility] [id number] To Whom It May Concern: 
 
Jenny Marsh is currently under the care of [address] [facility]. She will be excused from attending work from 4/26/2021 till 5/4/2021 due to the current illness and she  
 
will be able to return to work on 5/4/2021 in a stable condition. If there are questions or concerns please have the patient contact our office.  
 
 
 
Sincerely, 
 
 
Chester Granger MD

## 2021-12-10 ENCOUNTER — VIRTUAL VISIT (OUTPATIENT)
Dept: FAMILY MEDICINE CLINIC | Age: 36
End: 2021-12-10
Payer: COMMERCIAL

## 2021-12-10 DIAGNOSIS — J20.9 ACUTE BRONCHITIS, UNSPECIFIED ORGANISM: Primary | ICD-10-CM

## 2021-12-10 DIAGNOSIS — Z20.822 ENCOUNTER BY TELEHEALTH FOR SUSPECTED COVID-19: ICD-10-CM

## 2021-12-10 DIAGNOSIS — Z71.89 ADVICE GIVEN ABOUT COVID-19 VIRUS INFECTION: ICD-10-CM

## 2021-12-10 DIAGNOSIS — Z71.85 IMMUNIZATION COUNSELING: ICD-10-CM

## 2021-12-10 PROCEDURE — 99213 OFFICE O/P EST LOW 20 MIN: CPT | Performed by: FAMILY MEDICINE

## 2021-12-10 RX ORDER — PROMETHAZINE HYDROCHLORIDE AND CODEINE PHOSPHATE 6.25; 1 MG/5ML; MG/5ML
5 SOLUTION ORAL
Qty: 120 ML | Refills: 0 | Status: SHIPPED | OUTPATIENT
Start: 2021-12-10 | End: 2021-12-17

## 2021-12-10 RX ORDER — AZITHROMYCIN 250 MG/1
TABLET, FILM COATED ORAL
Qty: 6 TABLET | Refills: 0 | Status: SHIPPED | OUTPATIENT
Start: 2021-12-10 | End: 2022-01-10 | Stop reason: ALTCHOICE

## 2021-12-10 RX ORDER — ALBUTEROL SULFATE 90 UG/1
1 AEROSOL, METERED RESPIRATORY (INHALATION)
Qty: 1 EACH | Refills: 3 | Status: SHIPPED | OUTPATIENT
Start: 2021-12-10

## 2021-12-10 NOTE — PROGRESS NOTES
HISTORY OF PRESENT ILLNESS  Randi Gonzalez is a 39 y.o. female. Today's COVID-19 unvaccinated pt main concerns were provided on virtual visit and Video telemed format,  Present on VV for the concern of the current medical conditions,  pt is w/ comorbid history and  the pt does not know if has been exposed to covid-19 individual, and has not been tested yet, currently not working  patient currently have hoarseness having cough mostly dry, does not have shortness of breath and patient is not feeling lightheadedness and Dz for last couple of days,  pt has no low grade fever,  nl smell nl taste, patient also not complaining of some nausea feeling , no vomiting diarrhea, no body ache , and no orbital pain, no rash, patient also states of having a good family support at this time            No Known Allergies  Past Medical History:   Diagnosis Date    Abnormal Papanicolaou smear of cervix     follow-up normal    ADHD (attention deficit hyperactivity disorder), combined type 2016    [facility] 10/25/2016    Chlamydia infection 2014    Chronic fatigue 2018    Concentric fading 2018    Depressed 2015    Diabetes ([facility]     patient states pre-diabetes    Disability examination 2015    Encounter for completion of form with patient 2016    Exposure to sexually transmitted disease (STD) 12/3/2014    Fatigue 2014    Genital herpes     no current outbreaks    Hypertension     During pregnacy    Intractable migraine with aura with status migrainosus 11/10/2016    Obesity (BMI 30.0-34. 9) 2014    Poor concentration 2014     Past Surgical History:   Procedure Laterality Date    HX APPENDECTOMY  13    Laparoscopic Appendectomy    HX  SECTION      ,     HX DILATION AND CURETTAGE       Family History   Problem Relation Age of Onset    Cancer Mother     Neuropathy Mother     Stroke Mother      Social History     Tobacco Use    Smoking status: Never Smoker    Smokeless tobacco: Never Used   Substance Use Topics    Alcohol use: No      Lab Results   Component Value Date/Time    WBC 6.7 08/27/2020 07:36 AM    HGB 12.2 08/27/2020 07:36 AM    Hemoglobin (POC) 14.3 08/25/2010 11:22 AM    HCT 37.3 08/27/2020 07:36 AM    Hematocrit (POC) 42 08/25/2010 11:22 AM    PLATELET 991 84/71/5793 07:36 AM    MCV 84.0 08/27/2020 07:36 AM     Lab Results   Component Value Date/Time    Hemoglobin A1c 6.2 (H) 11/05/2014 05:52 PM    Hemoglobin A1c 6.2 (H) 01/20/2014 01:21 PM    Glucose 102 (H) 08/27/2020 07:36 AM    Glucose (POC) 119 (H) 10/20/2016 10:56 AM    LDL, calculated 102 (H) 01/23/2018 10:51 AM    Creatinine (POC) 0.9 08/25/2010 11:22 AM    Creatinine 0.77 08/27/2020 07:36 AM         Review of Systems   Constitutional: Positive for malaise/fatigue. Negative for chills and fever. HENT: Positive for ear pain, sinus pain and sore throat. Negative for congestion and nosebleeds. Eyes: Positive for redness. Negative for blurred vision, pain and discharge. Respiratory: Positive for sputum production. Negative for cough, shortness of breath and wheezing. Cardiovascular: Negative for chest pain and leg swelling. Gastrointestinal: Negative for constipation, diarrhea, nausea and vomiting. Genitourinary: Negative for dysuria and frequency. Musculoskeletal: Negative for joint pain and myalgias. Skin: Negative for itching and rash. Neurological: Negative for dizziness, loss of consciousness and headaches. Psychiatric/Behavioral: Negative for depression. The patient is not nervous/anxious and does not have insomnia. Physical Exam  Constitutional:       Appearance: She is not ill-appearing or toxic-appearing. HENT:      Head: Normocephalic and atraumatic. Mouth/Throat:      Mouth: Mucous membranes are moist.   Neurological:      Mental Status: She is alert and oriented to person, place, and time.    Psychiatric:         Mood and Affect: Mood normal.         Behavior: Behavior normal.         Thought Content: Thought content normal.         Judgment: Judgment normal.         ASSESSMENT and PLAN  Diagnoses and all orders for this visit:    1. Acute bronchitis, unspecified organism    2. Encounter by telehealth for suspected COVID-19  -     promethazine-codeine (PHENERGAN with CODEINE) 6.25-10 mg/5 mL syrup; Take 5 mL by mouth four (4) times daily as needed for Cough for up to 7 days. Max Daily Amount: 20 mL. 3. Advice given about COVID-19 virus infection    4. Immunization counseling    Other orders  -     azithromycin (ZITHROMAX) 250 mg tablet; 2 first day then one tab daily till finished  -     albuterol (PROVENTIL HFA, VENTOLIN HFA, PROAIR HFA) 90 mcg/actuation inhaler; Take 1 Puff by inhalation every four (4) hours as needed for Wheezing. Concern abdout COVID-19 addressed and detailed, pt was told that the best way to prevent illness is by vaccination and protection, to Wear a facemask , having social distance, and to get tested and re-tested, with contact tracing, in addition patient was recently advised to get tested today for better outcome  Pt was told that currently,there is no antiviral medication which is recommended to treat COVID-19. Current treatment is aimed to relieve sxs such as pain relievers no ibuprofen but mostly acetaminophen, anti Cough medication , plenty of Rest and a lot of oral hydration. Isolation and Contact tracing at this time     Also was advised to stay in isolation for 10-14 days at this time with cold sxs presentation, Pt was also told if develop dyspnea needs to call 911 or go to er, call for gloria advise, pt agreed with today's visit.    Pursuant to the emergency declaration under the [address] and [facility], [facility] waiver authority and the [employer] and [employer] Act, this Virtual Visit was conducted, with patient's consent, to reduce the patient's risk of exposure to COVID-19 and provide continuity of care for an established patient. Today's recommendations, Services were provided through a Video synchronous discussion virtually to substitute for in-person appointment.

## 2021-12-10 NOTE — PROGRESS NOTES
Chief Complaint   Patient presents with    Cough     1. Have you been to the ER, urgent care clinic since your last visit? Hospitalized since your last visit? No    2. Have you seen or consulted any other health care providers outside of the [address] [place] [address] since your last visit? Include any pap smears or colon screening. No    Call placed to pt. Verified patient with two type of identifiers. c/o dry cough x 1 week,  No other symptoms,   At home testing for covid negative on 12/6/21,  Denies covid exposure or sick contacts,  Taking delsym with no relief.

## 2022-01-10 ENCOUNTER — VIRTUAL VISIT (OUTPATIENT)
Dept: FAMILY MEDICINE CLINIC | Age: 37
End: 2022-01-10
Payer: COMMERCIAL

## 2022-01-10 DIAGNOSIS — Z71.89 ADVICE GIVEN ABOUT COVID-19 VIRUS INFECTION: ICD-10-CM

## 2022-01-10 DIAGNOSIS — R03.0 BLOOD PRESSURE ELEVATED WITHOUT HISTORY OF HTN: Primary | ICD-10-CM

## 2022-01-10 PROCEDURE — 99213 OFFICE O/P EST LOW 20 MIN: CPT | Performed by: FAMILY MEDICINE

## 2022-01-10 NOTE — PROGRESS NOTES
HISTORY OF PRESENT ILLNESS  Lori Zhu is a 39 y.o. female. Today Pt's main concerns were provided on virtual video format visit,  , COVID-19 unvaccinated pt is w/ comorbid medical history and unaware of been exposed to covid-19 individual, Pt Have been trying to stay safe  ,          HTN recently seen the dentis for root canals, was checked was told to f/u with pcp  Today pt present for Bp check and the patient stating that so far there has not been a Compliancy w/ low salt diet and  the patient has been active life style and does do the daily walking, in addition pt states that patien does obtain the bp at home few times a week and the average of 128/87,   today the pt denies Chest Pain, has no legs swelling no lightheadedness,  the pat has not been feeling anxious, and  Has not been feeling stressed out,   otherwise feeling better since the last visit        Current Outpatient Medications   Medication Sig Dispense Refill    albuterol (PROVENTIL HFA, VENTOLIN HFA, PROAIR HFA) 90 mcg/actuation inhaler Take 1 Puff by inhalation every four (4) hours as needed for Wheezing. (Patient not taking: Reported on 1/10/2022) 1 Each 3     No Known Allergies  Past Medical History:   Diagnosis Date    Abnormal Papanicolaou smear of cervix     follow-up normal    ADHD (attention deficit hyperactivity disorder), combined type 6/2/2016    Bipolar 2 disorder ([address] 10/25/2016    Chlamydia infection 1/28/2014    Chronic fatigue 1/23/2018    Concentric fading 1/23/2018    Depressed 5/8/2015    Diabetes ([facility]     patient states pre-diabetes    Disability examination 4/29/2015    Encounter for completion of form with patient 7/13/2016    Exposure to sexually transmitted disease (STD) 12/3/2014    Fatigue 8/13/2014    Genital herpes     no current outbreaks    Hypertension     During pregnacy    Intractable migraine with aura with status migrainosus 11/10/2016    Obesity (BMI 30.0-34. 9) 8/13/2014    Poor concentration 2014     Past Surgical History:   Procedure Laterality Date    HX APPENDECTOMY  13    Laparoscopic Appendectomy    HX  SECTION      ,     HX DILATION AND CURETTAGE       Family History   Problem Relation Age of Onset    Cancer Mother     Neuropathy Mother     Stroke Mother      Social History     Tobacco Use    Smoking status: Never Smoker    Smokeless tobacco: Never Used   Substance Use Topics    Alcohol use: No      Lab Results   Component Value Date/Time    Hemoglobin A1c 6.2 (H) 2014 05:52 PM    Hemoglobin A1c 6.2 (H) 2014 01:21 PM    Glucose 102 (H) 2020 07:36 AM    Glucose (POC) 119 (H) 10/20/2016 10:56 AM    LDL, calculated 102 (H) 2018 10:51 AM    Creatinine (POC) 0.9 2010 11:22 AM    Creatinine 0.77 2020 07:36 AM      Lab Results   Component Value Date/Time    Cholesterol, total 169 2018 10:51 AM    HDL Cholesterol 33 (L) 2018 10:51 AM    LDL, calculated 102 (H) 2018 10:51 AM    Triglyceride 168 (H) 2018 10:51 AM        Review of Systems   Constitutional: Negative for chills and fever. HENT: Negative for congestion and nosebleeds. Eyes: Negative for blurred vision and pain. Respiratory: Negative for cough, shortness of breath and wheezing. Cardiovascular: Negative for chest pain and leg swelling. Gastrointestinal: Negative for constipation, diarrhea, nausea and vomiting. Genitourinary: Negative for dysuria and frequency. Musculoskeletal: Negative for joint pain and myalgias. Skin: Negative for itching and rash. Neurological: Negative for dizziness, loss of consciousness and headaches. Psychiatric/Behavioral: Negative for depression. The patient is not nervous/anxious and does not have insomnia. Physical Exam  Vitals and nursing note reviewed. Constitutional:       Appearance: She is well-developed. HENT:      Head: Normocephalic and atraumatic.    Eyes: Conjunctiva/sclera: Conjunctivae normal.      Pupils: Pupils are equal, round, and reactive to light. Neck:      Thyroid: No thyromegaly. Vascular: No JVD. Cardiovascular:      Rate and Rhythm: Normal rate and regular rhythm. Pulmonary:      Effort: Pulmonary effort is normal.   Skin:     Findings: No erythema. Neurological:      Mental Status: She is alert and oriented to person, place, and time. Cranial Nerves: No cranial nerve deficit. Deep Tendon Reflexes: Reflexes are normal and symmetric. Psychiatric:         Mood and Affect: Mood normal.         Behavior: Behavior normal.         Thought Content: Thought content normal.         Judgment: Judgment normal.         ASSESSMENT and PLAN  Diagnoses and all orders for this visit:    1. Blood pressure elevated without history of HTN    2. Advice given about COVID-19 virus infection          Pt was talked to in detail about life style mods which could bring his bp lower than the current reading,  Was told to try to Manage Stress, Increase your level of activity by starting with light aerobic exercises such as walking, using a treadmill, swimming, climbing stairs, or jogging.  Exercising for at least 30 minutes daily helps lower diastolic blood pressure, lower the prepped food, Salt and caffien, Lose Extra Weight,  Excess fat makes the heart work harder, which leads to increased blood pressure, have a diet rich in fruits, vegetables, whole grains, and low-fat dairy        Patient advised to have the [address] and the mask on most of the time, social distance and handwashing avoid crowded area pursuant to the emergency declaration under the [employer] and [facility], [facility] waiver authority and the [employer] and [employer] Act, this Virtual Visit was conducted, with patient's consent, to reduce the patient's risk of exposure to COVID-19 and provide continuity of care for an established patient Services were provided through a Video synchronous discussion virtually to substitute for in-person appointment. [facility] DIVISION OF KIDNEY DISEASES AND HYPERTENSION -- INITIAL CONSULT NOTE,    PPD#12 s/p spontaneous vaginal delivery     S:  The patient has no complaints.    Vital Signs Last 24 Hrs  T(C): 36 (2018 03:46), Max: 37.1 (2018 14:01)  T(F): 96.8 (2018 03:46), Max: 98.8 (2018 14:01)  HR: 80 (2018 03:46) (71 - 99)  BP: 136/73 (2018 03:46) (93/54 - 181/83)  BP(mean): --  RR: 16 (2018 03:46) (15 - 20)  SpO2: 97% (2018 16:00) (97% - 100%)    ( 10 Days ago )    T(C): 36.6 (18 @ 19:30), Max: 36.9 (18 @ 07:53)  HR: 80 (18 @ 19:30) (80 - 84)  BP: 109/67 (18 @ 19:30) (109/67 - 113/73)  RR: 18 (18 @ 19:30) (18 - 20)    Abdomen:  soft, non-tender, non-distended, +bowel sounds.  Uterus:  Fundus firm below umbilicus  VE:  +lochia  Ext:  Non-tender. Edema,                       10.7   14.0  )-----------( 198      ( 2018 08:07 )             32.9      PPD# 12 s/p . S/P tubal ligation,     (normal spontaneous vaginal delivery)      PAST HISTORY : Neg,  --------------------------------------------------------------------------------  PAST MEDICAL & SURGICAL HISTORY: Neg,    FAMILY HISTORY: No ESRD,    PAST SOCIAL HISTORY: Non Smoker,    ALLERGIES & MEDICATIONS  --------------------------------------------------------------------------------  Allergies    No Known Allergies    Standing Inpatient Medications  acetaminophen  IVPB. 1000 milliGRAM(s) IV Intermittent once  furosemide   Injectable 40 milliGRAM(s) IV Push every 8 hours  labetalol 200 milliGRAM(s) Oral four times a day  NIFEdipine IR 20 milliGRAM(s) Oral three times a day  potassium chloride    Tablet ER 20 milliEquivalent(s) Oral three times a day  sodium chloride 0.9% lock flush 3 milliLiter(s) IV Push daily    PRN Inpatient Medications  acetaminophen   Tablet. 650 milliGRAM(s) Oral every 6 hours PRN      REVIEW OF SYSTEMS  --------------------------------------------------------------------------------  Gen: + weight changes,   Skin: No rashes  Head/Eyes/Ears/Mouth: No headache; Normal hearing; Normal vision w/o blurriness;   Respiratory: No dyspnea, cough, wheezing, hemoptysis  CV: No chest pain, PND, orthopnea  GI: No abdominal pain, diarrhea, constipation, nausea, vomiting, melena, hematochezia  : No increased frequency, dysuria, hematuria, nocturia  MSK: No joint pain/swelling; no back pain; + edema  Neuro: No dizziness/lightheadedness, weakness, seizures, numbness, tingling  Heme: No easy bruising or bleeding  Endo: No heat/cold intolerance  Psych: No significant nervousness, anxiety, stress, depression    All other systems were reviewed and are negative, except as noted.    VITALS/PHYSICAL EXAM  --------------------------------------------------------------------------------  T(C): 36 (18 @ 03:46), Max: 37.1 (18 @ 14:01)  HR: 80 (18 03:46) (71 - 99)  BP: 136/73 (18 03:46) (93/54 - 181/83)  RR: 16 (18 03:46) (15 - 20)  SpO2: 97% (18 16:00) (97% - 100%)  Wt(kg): --  Height (cm): 170.2 (18 03:37)  Weight (kg): 124.284 (18 03:37)  BMI (kg/m2): 42.9 (18 03:37)  BSA (m2): 2.31 (18 03:37)      01-15-18 @ 07:01  -  18 @ 07:00  --------------------------------------------------------  IN: 1000 mL / OUT: 3120 mL / NET: -2120 mL    18 @ 07:01  -  18 @ 06:14  --------------------------------------------------------  IN: 500 mL / OUT: 2655 mL / NET: -2155 mL      Physical Exam:  	Gen: NAD, well-appearing  	HEENT: PERRL, supple neck, clear oropharynx  	Pulm: CTA B/L  	CV: RRR, S1S2; no rub  	Back: No spinal or CVA tenderness; no sacral edema  	Abd: +BS, soft, nontender/distended, Palpable uterus,  	: No suprapubic tenderness  	UE: Warm, FROM, no clubbing, intact strength; no edema; no asterixis  	LE: Warm, FROM, no clubbing, intact strength; +  edema  	Neuro: No focal deficits,   	Psych: Normal affect and mood  	Skin: Warm, without rashes  	Vascular access: NA,    LABS/STUDIES  --------------------------------------------------------------------------------              12.2   6.3   >-----------<  285      [18 06:39]              37.9     140  |  100  |  7.0  ----------------------------<  132      [18 20:19]  4.1   |  24.0  |  0.73        Ca     7.0     [18 20:19]      Mg     6.4     [18 13:42]      Phos  3.4     [18 20:19]    TPro  x   /  Alb  3.5  /  TBili  x   /  DBili  x   /  AST  x   /  ALT  x   /  AlkPhos  x   [18 20:19]    PT/INR: PT 11.2 , INR 1.02       [18 06:39]  PTT: 31.6       [18 06:39]    Uric acid 7.8      [18 20:19]        [01-15-18 @ 20:40]    Creatinine Trend:  SCr 0.73 [:19]  SCr 0.62 [ 06:36]  SCr 0.85 [01-15 @ 20:40]    Urinalysis - [18 19:38]      Color Pale Yellow / Appearance Clear / SG 1.010 / pH 7.0      Gluc Negative / Ketone Negative  / Bili Negative / Urobili Negative       Blood Small / Protein Negative / Leuk Est Negative / Nitrite Negative      RBC 3-5 / WBC 0-2 / Hyaline  / Gran  / Sq Epi  / Non Sq Epi Occasional / Bacteria Occasional    Urine Creatinine 30      [18 19:38]  Urine Protein <4.0      [18 @ 19:38]    Syphilis Screen (Treponema Pallidum Ab) Negative      [18 @ 17:52]

## 2022-01-10 NOTE — PROGRESS NOTES
Chief Complaint   Patient presents with    Hypertension     1. Have you been to the ER, urgent care clinic since your last visit? Hospitalized since your last visit? No    2. Have you seen or consulted any other health care providers outside of the [address] [place] [address] since your last visit? Include any pap smears or colon screening. Yes When: 1/6/22 Where: dentist  Reason for visit: tooth extraction    Call placed to pt. Verified patient with two type of identifiers. Seen at dentist due to elevated blood pressure.    Blood pressure is back to normal

## 2022-02-13 NOTE — PROGRESS NOTES
Name and  verified      Chief Complaint   Patient presents with    [facility] reviewed-discussed with patient. Patient stated last PAP exam . General

## 2022-03-17 ENCOUNTER — NURSE TRIAGE (OUTPATIENT)
Dept: OTHER | Facility: CLINIC | Age: 37
End: 2022-03-17

## 2022-03-17 NOTE — TELEPHONE ENCOUNTER
Received call from Joel Chavez at [facility] with Red Flag Complaint. Subjective: Caller states \"I had an upper respiratory infection last week, it is getting better but I have a lot of sinus pressure and my eyes are draining. \"     Current Symptoms: Sinus pain and pressure and eye pain and discharge in R eye about 3 days ago. Pt denies vision changes. Has white drainage from eye, mild pain in corner of eye and sinus pressure, mild productive cough     Onset: 3 days ago; unchanged    Associated Symptoms: NA    Pain Severity: mild pain but mostly pressure and discomfort    Temperature: Denies fever    What has been tried: Drops (moisturizing drops-systene)    LMP: last month Pregnant: Unknown    Recommended disposition: Grayson Alfred 1100 advice provided, patient verbalizes understanding; denies any other questions or concerns; instructed to call back for any new or worsening symptoms. pt agrees and plans to f/u with PCP. Also advised to request referral to ENT for chronic unilateral symptoms. Attention Provider: Thank you for allowing me to participate in the care of your patient. The patient was connected to triage in response to information provided to the [facility]. Please do not respond through this encounter as the response is not directed to a shared pool.     Reason for Disposition   [1] Very small amount of discharge AND [2] only in corner of eye    Protocols used: EYE - PUS OR DISCHARGE-ADULT-

## 2022-03-18 PROBLEM — R53.82 CHRONIC FATIGUE: Status: ACTIVE | Noted: 2018-01-23

## 2022-03-19 PROBLEM — Z34.90 PREGNANCY: Status: ACTIVE | Noted: 2019-01-16

## 2022-03-20 PROBLEM — H53.129 CONCENTRIC FADING: Status: ACTIVE | Noted: 2018-01-23

## 2022-09-17 ENCOUNTER — HOSPITAL ENCOUNTER (EMERGENCY)
Age: 37
Discharge: LEFT BEFORE DISCHARGE | End: 2022-09-17
Attending: STUDENT IN AN ORGANIZED HEALTH CARE EDUCATION/TRAINING PROGRAM | Admitting: STUDENT IN AN ORGANIZED HEALTH CARE EDUCATION/TRAINING PROGRAM
Payer: COMMERCIAL

## 2022-09-17 VITALS
OXYGEN SATURATION: 100 % | TEMPERATURE: 97.8 F | HEART RATE: 70 BPM | SYSTOLIC BLOOD PRESSURE: 134 MMHG | DIASTOLIC BLOOD PRESSURE: 87 MMHG | RESPIRATION RATE: 18 BRPM

## 2022-09-17 DIAGNOSIS — Z53.21 ELOPED FROM EMERGENCY DEPARTMENT: Primary | ICD-10-CM

## 2022-09-17 PROCEDURE — 93005 ELECTROCARDIOGRAM TRACING: CPT

## 2022-09-17 PROCEDURE — 99283 EMERGENCY DEPT VISIT LOW MDM: CPT

## 2022-09-17 RX ORDER — KETOROLAC TROMETHAMINE 30 MG/ML
30 INJECTION, SOLUTION INTRAMUSCULAR; INTRAVENOUS
Status: DISCONTINUED | OUTPATIENT
Start: 2022-09-17 | End: 2022-09-18 | Stop reason: HOSPADM

## 2022-09-17 RX ORDER — CYCLOBENZAPRINE HCL 10 MG
10 TABLET ORAL
Status: DISCONTINUED | OUTPATIENT
Start: 2022-09-17 | End: 2022-09-18 | Stop reason: HOSPADM

## 2022-09-17 NOTE — ED TRIAGE NOTES
Patient arrives to ED complaining of back pain for 3-4 days, reports she went to the gym today and sat in the [id number] [place] [address]. Reports back pain persisted, now has chest pain and left arm pain especially with taking a deep breath. Patient reports heavy lifting lately with renovating her house.

## 2022-09-18 LAB
ATRIAL RATE: 69 BPM
CALCULATED P AXIS, ECG09: 76 DEGREES
CALCULATED R AXIS, ECG10: 73 DEGREES
CALCULATED T AXIS, ECG11: 75 DEGREES
DIAGNOSIS, 93000: NORMAL
P-R INTERVAL, ECG05: 146 MS
Q-T INTERVAL, ECG07: 386 MS
QRS DURATION, ECG06: 92 MS
QTC CALCULATION (BEZET), ECG08: 413 MS
VENTRICULAR RATE, ECG03: 69 BPM

## 2022-09-18 NOTE — ED PROVIDER NOTES
HPI     Austin Vargas is a 39 y.o. female with Hx of ADHD, bipolar, chronic fatigue, STI, depression, migraine who presents ambulatory by herself to [facility] ED with cc of L shoulder pain/ chest pain. Patient reports that she has had pain in the left trapezius region that radiated into her left arm and down into her left-sided chest.  She states that she noticed the trapezius pain first, went to a sono today and worked out and felt the pain had actually improved. 5 hours later she took a shower and lay down and states that she then started to have left arm pain that radiated into her chest.  She states that her pain is actually improved, but she still has significant anxiety because she has had chest pain. She also reports that she may have had some facial numbness, but also states that she was drinking mint tea and this could have made her feel more numb in her mouth. 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 Denies tobacco, alcohol, substance abuse          PCP: Tomeka Moody MD    There are no other complaints, changes or physical findings at this time.       Past Medical History:   Diagnosis Date    Abnormal Papanicolaou smear of cervix     follow-up normal    ADHD (attention deficit hyperactivity disorder), combined type 6/2/2016    Bipolar 2 disorder ([facility] [address] 10/25/2016    Chlamydia infection 1/28/2014    Chronic fatigue 1/23/2018    Concentric fading 1/23/2018    Depressed 5/8/2015    Diabetes ([facility] [address]     patient states pre-diabetes    Disability examination 4/29/2015    Encounter for completion of form with patient 7/13/2016    Exposure to sexually transmitted disease (STD) 12/3/2014    Fatigue 8/13/2014    Genital herpes     no current outbreaks    Hypertension     During pregnacy    Intractable migraine with aura with status migrainosus 11/10/2016    Obesity (BMI 30.0-34. 9) 2014    Poor concentration 2014       Past Surgical History:   Procedure Laterality Date    HX APPENDECTOMY  12    Laparoscopic Appendectomy    HX  SECTION      ,     HX DILATION AND CURETTAGE           Family History:   Problem Relation Age of Onset    Cancer Mother     Neuropathy Mother     Stroke Mother        Social History     Socioeconomic History    Marital status: SINGLE     Spouse name: Not on file    Number of children: Not on file    Years of education: Not on file    Highest education level: Not on file   Occupational History    Not on file   Tobacco Use    Smoking status: Never    Smokeless tobacco: Never   Substance and Sexual Activity    Alcohol use: No    Drug use: No    Sexual activity: Yes     Partners: Male     Birth control/protection: None   Other Topics Concern     Service Not Asked    Blood Transfusions Not Asked    Caffeine Concern Not Asked    Occupational Exposure Not Asked    Hobby Hazards Not Asked    Sleep Concern Not Asked    Stress Concern Not Asked    Weight Concern Not Asked    Special Diet Not Asked    Back Care Not Asked    Exercise Not Asked    Bike Helmet Not Asked    Seat Belt Not Asked    Self-Exams Not Asked   Social History Narrative    Not on file     Social Determinants of Health     Financial Resource Strain: Not on file   Food Insecurity: Not on file   Transportation Needs: Not on file   Physical Activity: Not on file   Stress: Not on file   Social Connections: Not on file   Intimate Partner Violence: Not on file   Housing Stability: Not on file         ALLERGIES: Patient has no known allergies. Review of Systems   Constitutional:  Negative for activity change, appetite change, chills and fever. HENT:  Negative for congestion, rhinorrhea and sore throat. Eyes:  Negative for visual disturbance. Respiratory:  Negative for cough and shortness of breath. Cardiovascular:  Positive for chest pain. Gastrointestinal:  Negative for abdominal pain, diarrhea, nausea and vomiting. Genitourinary:  Negative for flank pain, frequency and urgency. Musculoskeletal:  Positive for arthralgias, back pain and myalgias. Negative for gait problem, joint swelling and neck pain. Skin:  Negative for color change and rash. Neurological:  Negative for dizziness, speech difficulty and weakness. Psychiatric/Behavioral:  Negative for agitation, behavioral problems and confusion. All other systems reviewed and are negative. Vitals:    09/17/22 1944   BP: 134/87   Pulse: 70   Resp: 18   Temp: 97.8 °F (36.6 °C)   SpO2: 100%            Physical Exam  Vitals and nursing note reviewed. Constitutional:       General: She is not in acute distress. Appearance: She is well-developed. HENT:      Head: Normocephalic and atraumatic. Right Ear: External ear normal.      Left Ear: External ear normal.   Eyes:      Conjunctiva/sclera: Conjunctivae normal.      Pupils: Pupils are equal, round, and reactive to light. Cardiovascular:      Rate and Rhythm: Normal rate and regular rhythm. Heart sounds: Normal heart sounds. Pulmonary:      Effort: Pulmonary effort is normal.      Breath sounds: Normal breath sounds. Musculoskeletal:         General: Normal range of motion. Cervical back: Normal range of motion and neck supple. Comments: MSK tension noted over L trapezius    Skin:     General: Skin is warm and dry. Neurological:      Mental Status: She is alert and oriented to person, place, and time. Psychiatric:         Behavior: Behavior normal.         Thought Content:  Thought content normal.         Judgment: Judgment normal.        MDM  Number of Diagnoses or Management Options  Eloped from emergency department  Diagnosis management comments: 9:40 PM  Attempted to find patient for lab work and to move to a room, [facility] to find [facility] [address] [place] [address] [facility] to [facility].        ED Course as of 22 0415   Sat Sep 17, 2022   2042 EK normal sinus rhythm ventricular rate 69 normal axis no ST elevation [NS]      ED Course User Index  [NS] Imtiaz Thaeyr MD       Procedures

## 2022-10-26 NOTE — PROGRESS NOTES
1036: Patient arrived from office with confirmed SROM this morning at 0500. SVE /0 in office. Two previous LTCS, patient states she would like a repeat  Section. Patient states positive fetal movement, denies vaginal bleeding. Placed on monitors in LD room 11 and assessing now. 1534: 30 minute phone call to [facility] prior to transfer Daily Note     Today's date: 10/26/2022  Patient name: Saint Plana  : 1943  MRN: [id number]  Referring provider: KRISTINA Shi  Dx:   Encounter Diagnosis     ICD-10-CM    1  Chronic midline thoracic back pain  M54 6     G89 29                   Subjective: Patient reports feeling good after her sessions but today she is stiff due to the weather  Objective: See treatment diary below      Assessment: Tolerated treatment well  Mobility on foam roll added to address stiffness in upper/mid back  She had difficulty with mat mobility due to dizziness and positioning of neck due to forward head posture  Bilat h-abd added to progress postural strengthening  Patient demonstrated fatigue post treatment and would benefit from continued PT      Plan: Progress treatment as tolerated         Precautions: HTN, cardiac hx, dysphonia, dysphagia, Sjogrens      RE: 11/10  EPOC:              Manuals 10/13 10/17 10/20 10/26                                                             Neuro Re-Ed                                                                                                        Ther Ex             Bike/UBE  UBE 4' retro UBE 4' retro UBE 5' retro         scap retraction 5"x20 5"x20 5"x20 5"x20         T/S ext 10"x10 W/ ball 5"x20 W/ ball 5" x20  W/ ball   5"x20         T/S rot 10"x10 10"x10 10"x10 bilat  NP         Lumbar rollout  10"x10 3 way 10"x10 3 way 10"x10  3 way         Row/LPD  YTB 2x10 ea Rows GTB/  LPD YTB   2x10 ea Rows GTB 2x10  LPD  YTB  2x10 5"          Bilateral ER   15x YTB  Supine 15x YTB         H-abd    Supine  2x10 YTB         Open book   10"x10 bilat  Top leg on foam roll 10"x10          Modified thread the needle  5"x10 ea 5"x10 ea   NV        Mobility on towel roll    Hugs  20x D/C        Pec stretch    single arm doorway 10"x5 bilat                                   Pt edu MS            HEP MS            Ther Activity                                       Gait Training                                       Modalities

## 2022-12-29 ENCOUNTER — NURSE TRIAGE (OUTPATIENT)
Dept: OTHER | Facility: CLINIC | Age: 37
End: 2022-12-29

## 2022-12-29 NOTE — TELEPHONE ENCOUNTER
Location of patient: [address] [place] at [facility] with [employer]. Subjective: Caller states \"tingling in left arm\"     Current Symptoms: see above, when she bends, her neck starts to tingle, then makes the arm tingle. Left fingertips are tingling right now, sometimes shoots up to shoulder. Sleeps on left side. Onset: 2-3 months ago; unchanged    Associated Symptoms: NA    Pain Severity: 0/10; tingling; intermittent    Temperature: n/a     What has been tried: otc pain medication    LMP:  last month  Pregnant: No    Recommended disposition: See PCP within 3 Days    Care advice provided, patient verbalizes understanding; denies any other questions or concerns; instructed to call back for any new or worsening symptoms. Patient/Caller agrees with recommended disposition; writer provided warm transfer to [email or web address] at [facility] for appointment scheduling    Attention Provider: Thank you for allowing me to participate in the care of your patient. The patient was connected to triage in response to information provided to the [facility]. Please do not respond through this encounter as the response is not directed to a shared pool.     Reason for Disposition   Numbness or tingling in one or both hands is a chronic symptom (recurrent or ongoing problem lasting > 4 weeks)    Protocols used: Neurologic Deficit-ADULT-[place]

## 2023-01-09 ENCOUNTER — OFFICE VISIT (OUTPATIENT)
Dept: FAMILY MEDICINE CLINIC | Age: 38
End: 2023-01-09
Payer: COMMERCIAL

## 2023-01-09 VITALS
RESPIRATION RATE: 18 BRPM | HEART RATE: 96 BPM | OXYGEN SATURATION: 98 % | HEIGHT: 68 IN | TEMPERATURE: 98.4 F | DIASTOLIC BLOOD PRESSURE: 97 MMHG | WEIGHT: 197.2 LBS | BODY MASS INDEX: 29.89 KG/M2 | SYSTOLIC BLOOD PRESSURE: 154 MMHG

## 2023-01-09 DIAGNOSIS — M25.511 CHRONIC PAIN OF BOTH SHOULDERS: ICD-10-CM

## 2023-01-09 DIAGNOSIS — G89.29 CHRONIC PAIN OF BOTH SHOULDERS: ICD-10-CM

## 2023-01-09 DIAGNOSIS — F41.1 GAD (GENERALIZED ANXIETY DISORDER): ICD-10-CM

## 2023-01-09 DIAGNOSIS — M25.512 CHRONIC PAIN OF BOTH SHOULDERS: ICD-10-CM

## 2023-01-09 DIAGNOSIS — M54.2 NECK PAIN: Primary | ICD-10-CM

## 2023-01-09 PROBLEM — E16.8: Status: ACTIVE | Noted: 2018-05-24

## 2023-01-09 PROBLEM — O10.919 MATERNAL CHRONIC HYPERTENSION: Status: ACTIVE | Noted: 2023-01-09

## 2023-01-09 PROBLEM — F41.9 ANXIETY: Status: ACTIVE | Noted: 2019-06-28

## 2023-01-09 RX ORDER — METHYLPREDNISOLONE 4 MG/1
TABLET ORAL
Qty: 1 DOSE PACK | Refills: 0 | Status: SHIPPED | OUTPATIENT
Start: 2023-01-09

## 2023-01-09 RX ORDER — BUSPIRONE HYDROCHLORIDE 5 MG/1
2.5 TABLET ORAL
Qty: 30 TABLET | Refills: 0 | Status: SHIPPED | OUTPATIENT
Start: 2023-01-09

## 2023-01-09 RX ORDER — DICLOFENAC SODIUM 75 MG/1
75 TABLET, DELAYED RELEASE ORAL 2 TIMES DAILY
Qty: 18 TABLET | Refills: 0 | Status: SHIPPED | OUTPATIENT
Start: 2023-01-09

## 2023-01-09 NOTE — PROGRESS NOTES
HISTORY OF PRESENT ILLNESS  Trip Herzog is a 44 y.o. female.     Shoulder pain b/l     neck pain,   Kristyn  Obtain work note    {Choose one or more SmartLinks; press DELETE if none desired:[id number]}   {Choose one or more Last Lab values; press DELETE if none desired:9161936}     ROS    Physical Exam    ASSESSMENT and PLAN  {ASSESSMENT/PLAN:53463} Poor concentration 2014    Obesity (BMI 30.0-34.9) 2014    Contact dermatitis 2014    Prediabetes 2014    Chlamydia infection 2014    Nauseated 2014    Lightheadedness 2014    High risk sexual behavior 2014    Depression, acute 10/09/2013    Headache(784.0) 2013    Insomnia 2013    Elevated BP 2013    Acute appendicitis 2013     Current Outpatient Medications   Medication Sig Dispense Refill    methylPREDNISolone (MEDROL DOSEPACK) 4 mg tablet As directed for 6 days one package 1 Dose Pack 0    diclofenac EC (VOLTAREN) 75 mg EC tablet Take 1 Tablet by mouth two (2) times a day. 18 Tablet 0    busPIRone (BUSPAR) 5 mg tablet Take 0.5 Tablets by mouth two (2) times daily as needed (KAY). 30 Tablet 0    albuterol (PROVENTIL HFA, VENTOLIN HFA, PROAIR HFA) 90 mcg/actuation inhaler Take 1 Puff by inhalation every four (4) hours as needed for Wheezing. (Patient not taking: No sig reported) 1 Each 3     No Known Allergies  Past Medical History:   Diagnosis Date    Abnormal Papanicolaou smear of cervix     follow-up normal    ADHD (attention deficit hyperactivity disorder), combined type 2016    Bipolar 2 disorder ([facility] [address] 10/25/2016    Chlamydia infection 2014    Chronic fatigue 2018    Concentric fading 2018    Depressed 2015    Diabetes ([facility] [address]     patient states pre-diabetes    Disability examination 2015    Encounter for completion of form with patient 2016    Exposure to sexually transmitted disease (STD) 12/3/2014    Fatigue 2014    Genital herpes     no current outbreaks    Hypertension     During pregnacy    Intractable migraine with aura with status migrainosus 11/10/2016    Obesity (BMI 30.0-34. 9) 2014    Poor concentration 2014     Past Surgical History:   Procedure Laterality Date    HX APPENDECTOMY  13    Laparoscopic Appendectomy    HX  SECTION      ,     HX DILATION AND CURETTAGE       Family History   Problem Relation Age of Onset    Cancer Mother     Neuropathy Mother     Stroke Mother      Social History     Tobacco Use    Smoking status: Never    Smokeless tobacco: Never   Substance Use Topics    Alcohol use: No      Lab Results   Component Value Date/Time    Hemoglobin A1c 6.2 (H) 11/05/2014 05:52 PM    Hemoglobin A1c 6.2 (H) 01/20/2014 01:21 PM    Glucose 102 (H) 08/27/2020 07:36 AM    Glucose (POC) 119 (H) 10/20/2016 10:56 AM    LDL, calculated 102 (H) 01/23/2018 10:51 AM    Creatinine (POC) 0.9 08/25/2010 11:22 AM    Creatinine 0.77 08/27/2020 07:36 AM      Lab Results   Component Value Date/Time    Cholesterol, total 169 01/23/2018 10:51 AM    HDL Cholesterol 33 (L) 01/23/2018 10:51 AM    LDL, calculated 102 (H) 01/23/2018 10:51 AM    Triglyceride 168 (H) 01/23/2018 10:51 AM        Review of Systems   Constitutional:  Positive for malaise/fatigue. Negative for chills and fever. HENT:  Negative for congestion and nosebleeds. Eyes:  Negative for blurred vision and pain. Respiratory:  Negative for cough, shortness of breath and wheezing. Cardiovascular:  Negative for chest pain and leg swelling. Gastrointestinal:  Negative for constipation, diarrhea, nausea and vomiting. Genitourinary:  Negative for dysuria and frequency. Musculoskeletal:  Positive for back pain, joint pain and neck pain. Negative for myalgias. Skin:  Negative for itching and rash. Neurological:  Positive for weakness. Negative for dizziness, loss of consciousness and headaches. Psychiatric/Behavioral:  Positive for depression. The patient is nervous/anxious and has insomnia. Physical Exam  Vitals and nursing note reviewed. Constitutional:       Appearance: She is well-developed. She is obese. She is not ill-appearing or toxic-appearing. HENT:      Head: Normocephalic and atraumatic.       Mouth/Throat:      Mouth: Mucous membranes are moist.   Eyes:      Conjunctiva/sclera: Conjunctivae normal.      Pupils: Pupils are equal, round, and reactive to light. Neck:      Thyroid: No thyromegaly. Vascular: No JVD. Cardiovascular:      Rate and Rhythm: Normal rate and regular rhythm. Heart sounds: Normal heart sounds. No murmur heard. No friction rub. No gallop. Pulmonary:      Effort: Pulmonary effort is normal. No respiratory distress. Breath sounds: Normal breath sounds. No stridor. No wheezing or rales. Abdominal:      General: Bowel sounds are normal. There is no distension. Palpations: Abdomen is soft. There is no mass. Tenderness: There is no abdominal tenderness. Musculoskeletal:         General: Swelling and signs of injury present. No tenderness. Normal range of motion. Cervical back: Normal range of motion and neck supple. Spasms present. Pain with movement present. Thoracic back: Spasms present. Lumbar back: Spasms present. Lymphadenopathy:      Cervical: No cervical adenopathy. Skin:     Findings: No erythema or rash. Neurological:      Mental Status: She is alert and oriented to person, place, and time. Cranial Nerves: No cranial nerve deficit. Deep Tendon Reflexes: Reflexes are normal and symmetric. Psychiatric:         Attention and Perception: She is inattentive. She does not perceive auditory or visual hallucinations. Mood and Affect: Mood is anxious and depressed. Mood is not elated. Affect is labile. Affect is not blunt, flat, angry, tearful or inappropriate. Speech: She is communicative. Speech is not rapid and pressured, delayed, slurred or tangential.         Behavior: Behavior is slowed and withdrawn. Behavior is not agitated, aggressive, hyperactive or combative. Thought Content: Thought content normal. Thought content is not paranoid. Thought content does not include homicidal or suicidal ideation. Cognition and Memory: Cognition normal. Memory is not impaired.  She does not exhibit impaired recent memory or impaired remote memory. Judgment: Judgment normal. Judgment is not inappropriate. ASSESSMENT and PLAN    ICD-10-CM ICD-9-CM    1. Neck pain  M54.2 723.1 REFERRAL TO PHYSICAL THERAPY      methylPREDNISolone (MEDROL DOSEPACK) 4 mg tablet      diclofenac EC (VOLTAREN) 75 mg EC tablet      XR SPINE CERV PA LAT ODONT 3 V MAX      2. Chronic pain of both shoulders  M25.511 719.41 REFERRAL TO PHYSICAL THERAPY    G89.29 338.29 methylPREDNISolone (MEDROL DOSEPACK) 4 mg tablet    M25.512  diclofenac EC (VOLTAREN) 75 mg EC tablet      XR SPINE CERV PA LAT ODONT 3 V MAX      3. KAY (generalized anxiety disorder)  F41.1 300.02 busPIRone (BUSPAR) 5 mg tablet        Follow-up and Dispositions    Return in about 4 weeks (around 2/6/2023), or if symptoms worsen or fail to improve. was told to help with weight reduction, spinal manipulations, massage therapy, exercise therapy, to remain active but to avoid heavy lifting and pushing at this time for the next 6 weeks ,   Ice therapy 2-30min tid daily,   meds side effects and compliancy advised,  Call or rtc if worsens,   Pt agreed with today's recommendations.     Depression with anxiety in a stable condition, not suicidal,  medication's benefit outwt the side effects,  will reevaluate in 3 w for progression,     Counseling , social support, spiritual belonging, inc physical activity, ++ compliance advised, patient was told that should not mix any of current medication with any other illicit drugs, should not drink any alcoholic beverages while on such medication  patient acknowledged understanding and agreed with today's recommendation in addition patient was told to call for any concern    reviewed diet, exercise and weight control

## 2023-01-09 NOTE — LETTER
NOTIFICATION RETURN TO WORK / SCHOOL    1/10/2023 3:47 PM    Ms. Jud Kc  [address]  [place] [address]      To Whom It May Concern:    Jud Kc is currently under the care of [address] [facility]. She will return to work/school on: 01/11/2023    If there are questions or concerns please have the patient contact our office.         Sincerely,      Bradly Hernandez MD

## 2023-01-09 NOTE — LETTER
NOTIFICATION RETURN TO WORK / SCHOOL    1/9/2023 4:47 PM    Ms. Danielle Coley  [address]  [place] [facility] [place] [address]      To Whom It May Concern:    Danielle Coley is currently under the care of [facility]. She will return to work/school on: 1/10/2023 Afternoon DUE the morning Medical appointment. If there are questions or concerns please have the patient contact our office.         Sincerely,      Wilma Vasquez MD

## 2023-01-09 NOTE — PROGRESS NOTES
Chief Complaint   Patient presents with    Tingling     Left arm     Pt states she has had left arm tingling that comes and goes for the last 3 months, hurts when she moves neck in downward motion, also having pain in back and left shoulder, sometimes right shoulder. Pt states right shoulder was painful earlier today, using heating pad. 1. \"Have you been to the ER, urgent care clinic since your last visit? Hospitalized since your last visit? \"  No    2. \"Have you seen or consulted any other health care providers outside of the [address] [place] [address] since your last visit? \" No     3. For patients aged 39-70: Has the patient had a colonoscopy / FIT/ Cologuard? NA - based on age      If the patient is female:    4. For patients aged 41-77: Has the patient had a mammogram within the past 2 years? Yes - no Care Gap present      5. For patients aged 21-65: Has the patient had a pap smear?  Yes - no Care Gap present    Visit Vitals  BP (!) 154/97 (BP 1 Location: Left upper arm, BP Patient Position: Sitting)   Pulse 96   Temp 98.4 °F (36.9 °C) (Oral)   Resp 18   Ht 5' 8\" (1.727 m)   Wt 197 lb 3.2 oz (89.4 kg)   SpO2 98%   BMI 29.98 kg/m²

## 2023-02-03 ENCOUNTER — HOSPITAL ENCOUNTER (OUTPATIENT)
Dept: PHYSICAL THERAPY | Age: 38
End: 2023-02-03

## 2023-02-22 ENCOUNTER — OFFICE VISIT (OUTPATIENT)
Dept: FAMILY MEDICINE CLINIC | Age: 38
End: 2023-02-22

## 2023-02-22 VITALS
RESPIRATION RATE: 21 BRPM | TEMPERATURE: 99 F | HEART RATE: 79 BPM | BODY MASS INDEX: 29.7 KG/M2 | OXYGEN SATURATION: 100 % | WEIGHT: 196 LBS | HEIGHT: 68 IN | DIASTOLIC BLOOD PRESSURE: 86 MMHG | SYSTOLIC BLOOD PRESSURE: 137 MMHG

## 2023-02-22 DIAGNOSIS — T14.90XA MUSCLE INJURY: Primary | ICD-10-CM

## 2023-02-22 DIAGNOSIS — Z02.89 ENCOUNTER TO OBTAIN EXCUSE FROM WORK: ICD-10-CM

## 2023-02-22 DIAGNOSIS — M54.2 NECK PAIN: ICD-10-CM

## 2023-02-22 RX ORDER — LIDOCAINE 50 MG/G
PATCH TOPICAL
Qty: 30 EACH | Refills: 0 | Status: SHIPPED | OUTPATIENT
Start: 2023-02-22

## 2023-02-22 RX ORDER — IBUPROFEN 800 MG/1
800 TABLET ORAL
Qty: 30 TABLET | Refills: 0 | Status: SHIPPED | OUTPATIENT
Start: 2023-02-22

## 2023-02-22 NOTE — PROGRESS NOTES
Ben Franco (: 1985) is a 44 y.o. female, established patient, here for evaluation of the following chief complaint(s):  Fall (Fell x 2 weeks ago down the stairs chest pressure when she breaths )     stating that it did happen at home patient had some slippery shoes,   Patient  for history of fall for which states that it wasnot a fall just rabbed the door and wentt own, did not go to ER,   didnot hit the head to the hard object, has had no LOC, did not feel dizzy feeling safe at [place],   Patient Lives at home, the patient living status comfortable, patient does not drink plenty of fluids, and patient does not exercise regularly, patient's vision and hearing checked each year, which are both normal, currently patient has no numbness in your feet, stating that ++ house well lit,         Constitutional: no chills and fever,nad     HENT: no ear pain and nosebleeds. No blurred vision,   Respiratory: no shortness of breath, wheezing cough nor sore throat. Cardiovascular: Has no chest pain, leg swelling ,and racing heart . Gastrointestinal: No constipation, diarrhea, nausea and vomiting. Genitourinary: No frequency. Musculoskeletal: +++for multiple joint pain. Skin: no itching, pimples   Neurological: Negative for dizziness, no tremors  Psychiatric/Behavioral: Negative for depression,  not nervous/anxious.         General: Patient alert cooperative   HEENT; normocephalic and atraumatic     Neck: supple no adenopathy no JVD no bruit  Lungs: Clear to auscultation bilaterally no rales rhonchi or wheezes  Heart: Normal regular S1-S2 s no murmur  Breast exam deferred  Abdomen: Soft nontender normal bowel sounds    Extremities: abnormal range of motion ++ point tenderness normal pulses no edema,   Pelvic/: No lesion, no lymphadenopathy  Skin: abormal no lesion no rash      ASSESSMENT/PLAN:  Below is the assessment and plan developed based on review of pertinent history, physical exam, labs, studies, and medications. 1. Muscle injury  -     ibuprofen (MOTRIN) 800 mg tablet; Take 1 Tablet by mouth every eight (8) hours as needed for Pain., Normal, Disp-30 Tablet, R-0  -     lidocaine (LIDODERM) 5 %; Apply patch to the affected area for 12 hours a day and remove for 12 hours a day., Normal, Disp-30 Each, R-0  -     XR CHEST PA LAT; Future  2. Neck pain  -     REFERRAL TO [facility]  3. Encounter to obtain excuse from work    Return in about 4 weeks (around 3/22/2023), or if symptoms worsen or fail to improve. An electronic signature was used to authenticate this note.   -- Sammy Pimentel MD

## 2023-02-22 NOTE — LETTER
NOTIFICATION RETURN TO WORK / SCHOOL    2/22/2023 12:32 PM    Ms. Jenni Royal  [address] [facility] [address]      To Whom It May Concern:    Jenni Royal is currently under the care of [address] OFFICE-[facility]. She will return to work/school on: 2/23/2023 due to the current illness. If there are questions or concerns please have the patient contact our office.         Sincerely,      Ari Nova MD

## 2023-02-22 NOTE — PROGRESS NOTES
Identified pt with two pt identifiers(name and ). Reviewed record in preparation for visit and have obtained necessary documentation. Chief Complaint   Patient presents with    Alix Primus x 2 weeks ago down the stairs chest pressure when she breaths         Vitals:    23 1214   BP: (!) 144/85   Pulse: 79   Resp: 21   Temp: 99 °F (37.2 °C)   TempSrc: Oral   SpO2: 100%   Weight: 196 lb (88.9 kg)   Height: 5' 8\" (1.727 m)   PainSc:   0 - No pain       Health Maintenance Due   Topic    COVID-19 Vaccine (1)    Cervical cancer screen     A1C test (Diabetic or Prediabetic)     Depression Monitoring     Flu Vaccine (1)       Coordination of Care Questionnaire:  :   1) Have you been to an emergency room, urgent care, or hospitalized since your last visit? If yes, where when, and reason for visit? no       2. Have seen or consulted any other health care provider since your last visit? If yes, where when, and reason for visit? NO      Patient is accompanied by self I have received verbal consent from Ana Powers to discuss any/all medical information while they are present in the room.

## 2023-02-28 ENCOUNTER — HOSPITAL ENCOUNTER (EMERGENCY)
Age: 38
Discharge: HOME | End: 2023-02-28
Attending: EMERGENCY MEDICINE
Payer: COMMERCIAL

## 2023-02-28 VITALS
SYSTOLIC BLOOD PRESSURE: 147 MMHG | HEART RATE: 92 BPM | DIASTOLIC BLOOD PRESSURE: 90 MMHG | HEIGHT: 68 IN | WEIGHT: 196 LBS | OXYGEN SATURATION: 100 % | RESPIRATION RATE: 16 BRPM | BODY MASS INDEX: 29.7 KG/M2 | TEMPERATURE: 98.4 F

## 2023-02-28 DIAGNOSIS — M54.2 ACUTE NECK PAIN: Primary | ICD-10-CM

## 2023-02-28 DIAGNOSIS — M54.50 ACUTE LEFT-SIDED LOW BACK PAIN WITHOUT SCIATICA: ICD-10-CM

## 2023-02-28 PROCEDURE — 99283 EMERGENCY DEPT VISIT LOW MDM: CPT

## 2023-02-28 RX ORDER — NAPROXEN 500 MG/1
500 TABLET ORAL 2 TIMES DAILY WITH MEALS
Qty: 60 TABLET | Refills: 0 | Status: SHIPPED | OUTPATIENT
Start: 2023-02-28 | End: 2023-03-30

## 2023-02-28 RX ORDER — CYCLOBENZAPRINE HCL 10 MG
10 TABLET ORAL
Qty: 20 TABLET | Refills: 0 | Status: SHIPPED | OUTPATIENT
Start: 2023-02-28
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
## 2023-02-28 NOTE — LETTER
[facility] EMERGENCY DEPT  [address] [facility] [id number] [phone]    Work/School Note    Date: 2/28/2023    To Whom It May concern:    Gerry Anderson was seen and treated today in the emergency room by the following provider(s):  Attending Provider: Hernan Kelsey MD  Physician Assistant: ARIN Silveira. Gerry Anderson may return to work on [id number].     Sincerely,          ARIN Turner
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
## 2023-04-26 ENCOUNTER — OFFICE VISIT (OUTPATIENT)
Dept: FAMILY MEDICINE CLINIC | Age: 38
End: 2023-04-26
Payer: COMMERCIAL

## 2023-04-26 VITALS
TEMPERATURE: 98.6 F | DIASTOLIC BLOOD PRESSURE: 87 MMHG | RESPIRATION RATE: 20 BRPM | BODY MASS INDEX: 29.1 KG/M2 | HEIGHT: 68 IN | HEART RATE: 93 BPM | SYSTOLIC BLOOD PRESSURE: 138 MMHG | OXYGEN SATURATION: 98 % | WEIGHT: 192 LBS

## 2023-04-26 DIAGNOSIS — Z02.89 ENCOUNTER TO OBTAIN EXCUSE FROM WORK: ICD-10-CM

## 2023-04-26 DIAGNOSIS — M54.2 NECK PAIN: Primary | ICD-10-CM

## 2023-04-26 DIAGNOSIS — D50.8 OTHER IRON DEFICIENCY ANEMIA: ICD-10-CM

## 2023-04-26 PROCEDURE — 99214 OFFICE O/P EST MOD 30 MIN: CPT | Performed by: FAMILY MEDICINE

## 2023-04-26 RX ORDER — ACETAMINOPHEN, ASPIRIN (NSAID), AND CAFFEINE 250; 250; 65 MG/1; MG/1; MG/1
2 TABLET, FILM COATED ORAL
Qty: 60 TABLET | Refills: 0
Start: 2023-04-26

## 2023-04-26 NOTE — PROGRESS NOTES
Elma Liu (: 1985) is a 44 y.o. female, established patient, here for evaluation of the following chief complaint(s):  Headache and Back Pain    present with the c/o daily HA , Started since the traumatic head injury, the pain Is mostly one sided pulsating lasting few hrs, has tried otc not helping, pt states that the HA Worsens by lights and loud noises,    the pain is associated with feeling of  Nausea, and pt had no voimiting the pain is 7/10 at this time, it occures 2-4 times per wk,  if the HA occurs the pt is unable to do any work while having the HA,      On her menses now at the beginning and at the end, heavy not pregnant, stressed out,   Did not go to work yesterday, needs work note,         Constitutional: no chills and fever,  , nad     HENT: no ear pain or nosebleeds. No blurred vision  Respiratory: no shortness of breath, wheezing cough   Cardiovascular: Has no chest pain, ,and racing heart . Gastrointestinal: No constipation, diarrhea, nausea and vomiting. Genitourinary: No frequency. Musculoskeletal: Negative for joint pain. Skin: no itching, no rash. Neurological: Negative for dizziness, no tremors  Psychiatric/Behavioral: Negative for depression   is not nervous/anxious. and not depressed the patient is not nervous/anxious.         General:  alert cooperative appears stated for the age  [de-identified]; normocephalic and atraumatic PERRLA extraocular motor intact normal tympanic membrane normal external ear bilaterally, mucosal normal no drainage  Neck: supple no adenopathy no JVD no bruit  Lungs: Clear to auscultation bilaterally no rales rhonchi or wheezes  Heart: Normal regular S1-S2 ,  no murmur  Breast exam deferred  Abdomen: Soft nontender normal bowel sounds   Extremities: Normal range of motion no point tenderness normal pulses no edema  Pelvic/: No lesion, no lymphadenopathy  Skin: Normal no lesion no rash      ASSESSMENT/PLAN:  Below is the assessment and plan developed based on review of pertinent history, physical exam, labs, studies, and medications. 1. Neck pain  -     CBC W/O DIFF; Future  -     FERRITIN; Future  -     TSH 3RD GENERATION; Future  -     METABOLIC PANEL, COMPREHENSIVE; Future  2. Other iron deficiency anemia  -     CBC W/O DIFF; Future  -     FERRITIN; Future  -     TSH 3RD GENERATION; Future  -     METABOLIC PANEL, COMPREHENSIVE; Future  3. Encounter to obtain excuse from work  -     CBC W/O DIFF; Future  -     FERRITIN; Future  -     TSH 3RD GENERATION; Future  -     METABOLIC PANEL, COMPREHENSIVE; Future      Return in about 3 months (around 7/26/2023), or if symptoms worsen or fail to improve. Secondary to the patient acute medical conditions, a letter on the pt's behalf was completed, pt's multiple questions answered to the best knowledge,  will keep a copy in the chart for further correspondence, patient was informed about the safety and  regulations regarding this condition patient was also advised to follow-up with HR for further guidelines patient acknowledged understanding and agreed with today's recommendations     An electronic signature was used to authenticate this note.   -- Carmen Bauer MD

## 2023-04-26 NOTE — PROGRESS NOTES
Chief Complaint   Patient presents with    Headache       1. \"Have you been to the ER, urgent care clinic since your last visit? Hospitalized since your last visit? \" No    2. \"Have you seen or consulted any other health care providers outside of the [address] [place] [address] since your last visit? \" No     3. For patients aged 39-70: Has the patient had a colonoscopy / FIT/ Cologuard? NA - based on age      If the patient is female:    4. For patients aged 41-77: Has the patient had a mammogram within the past 2 years? NA - based on age or sex      11. For patients aged 21-65: Has the patient had a pap smear? Yes - no Care Gap present    Health Maintenance Due   Topic Date Due    COVID-19 Vaccine (1) Never done    Varicella Vaccine (1 of 2 - 2-dose childhood series) Never done    Cervical cancer screen  Never done    A1C test (Diabetic or Prediabetic)  11/05/2015   Verified patient with two type of identifiers.

## 2023-04-30 RX ORDER — METHYLPREDNISOLONE 4 MG/1
TABLET ORAL
COMMUNITY
Start: 2023-01-09 | End: 2023-05-31

## 2023-04-30 RX ORDER — BUSPIRONE HYDROCHLORIDE 5 MG/1
TABLET ORAL 2 TIMES DAILY PRN
COMMUNITY
Start: 2023-01-09

## 2023-04-30 RX ORDER — DICLOFENAC SODIUM 75 MG/1
TABLET, DELAYED RELEASE ORAL 2 TIMES DAILY
COMMUNITY
Start: 2023-01-09

## 2023-04-30 RX ORDER — ALBUTEROL SULFATE 90 UG/1
1 AEROSOL, METERED RESPIRATORY (INHALATION) EVERY 4 HOURS PRN
COMMUNITY
Start: 2021-12-10

## 2023-04-30 RX ORDER — IBUPROFEN 800 MG/1
TABLET ORAL EVERY 8 HOURS PRN
COMMUNITY
Start: 2023-02-22

## 2023-04-30 RX ORDER — CYCLOBENZAPRINE HCL 10 MG
TABLET ORAL 3 TIMES DAILY PRN
COMMUNITY
Start: 2023-02-28

## 2023-04-30 RX ORDER — LIDOCAINE 50 MG/G
PATCH TOPICAL
COMMUNITY
Start: 2023-02-22

## 2023-05-25 ENCOUNTER — OFFICE VISIT (OUTPATIENT)
Age: 38
End: 2023-05-25

## 2023-05-25 DIAGNOSIS — G47.00 INSOMNIA, UNSPECIFIED TYPE: ICD-10-CM

## 2023-05-25 DIAGNOSIS — R03.0 ELEVATED BLOOD-PRESSURE READING, WITHOUT DIAGNOSIS OF HYPERTENSION: Primary | ICD-10-CM

## 2023-05-25 DIAGNOSIS — F31.81 BIPOLAR 2 DISORDER (HCC): ICD-10-CM

## 2023-05-25 DIAGNOSIS — Z20.2 EXPOSURE TO SEXUALLY TRANSMITTED DISEASE (STD): ICD-10-CM

## 2023-05-26 LAB
ALBUMIN SERPL-MCNC: 3.7 G/DL (ref 3.5–5)
ALBUMIN/GLOB SERPL: 1.1 (ref 1.1–2.2)
ALP SERPL-CCNC: 58 U/L (ref 45–117)
ALT SERPL-CCNC: 22 U/L (ref 12–78)
ANION GAP SERPL CALC-SCNC: 2 MMOL/L (ref 5–15)
AST SERPL-CCNC: 21 U/L (ref 15–37)
BILIRUB SERPL-MCNC: 0.3 MG/DL (ref 0.2–1)
BUN SERPL-MCNC: 11 MG/DL (ref 6–20)
BUN/CREAT SERPL: 14 (ref 12–20)
CALCIUM SERPL-MCNC: 8.9 MG/DL (ref 8.5–10.1)
CHLORIDE SERPL-SCNC: 107 MMOL/L (ref 97–108)
CO2 SERPL-SCNC: 29 MMOL/L (ref 21–32)
CREAT SERPL-MCNC: 0.77 MG/DL (ref 0.55–1.02)
ERYTHROCYTE [DISTWIDTH] IN BLOOD BY AUTOMATED COUNT: 15.5 % (ref 11.5–14.5)
FERRITIN SERPL-MCNC: 5 NG/ML (ref 8–252)
GLOBULIN SER CALC-MCNC: 3.3 G/DL (ref 2–4)
GLUCOSE SERPL-MCNC: 130 MG/DL (ref 65–100)
HCT VFR BLD AUTO: 32.4 % (ref 35–47)
HGB BLD-MCNC: 9.6 G/DL (ref 11.5–16)
MCH RBC QN AUTO: 24.1 PG (ref 26–34)
MCHC RBC AUTO-ENTMCNC: 29.6 G/DL (ref 30–36.5)
MCV RBC AUTO: 81.2 FL (ref 80–99)
NRBC # BLD: 0 K/UL (ref 0–0.01)
NRBC BLD-RTO: 0 PER 100 WBC
PLATELET # BLD AUTO: 331 K/UL (ref 150–400)
PMV BLD AUTO: 12 FL (ref 8.9–12.9)
POTASSIUM SERPL-SCNC: 4.2 MMOL/L (ref 3.5–5.1)
PROT SERPL-MCNC: 7 G/DL (ref 6.4–8.2)
RBC # BLD AUTO: 3.99 M/UL (ref 3.8–5.2)
SODIUM SERPL-SCNC: 138 MMOL/L (ref 136–145)
TSH SERPL DL<=0.05 MIU/L-ACNC: 1.86 UIU/ML (ref 0.36–3.74)
WBC # BLD AUTO: 5 K/UL (ref 3.6–11)

## 2023-05-31 ENCOUNTER — TELEMEDICINE (OUTPATIENT)
Age: 38
End: 2023-05-31
Payer: COMMERCIAL

## 2023-05-31 DIAGNOSIS — R53.83 FATIGUE, UNSPECIFIED TYPE: ICD-10-CM

## 2023-05-31 DIAGNOSIS — G47.01 INSOMNIA DUE TO MEDICAL CONDITION: ICD-10-CM

## 2023-05-31 DIAGNOSIS — R45.1 RESTLESSNESS: Primary | ICD-10-CM

## 2023-05-31 DIAGNOSIS — Z02.89 ENCOUNTER TO OBTAIN EXCUSE FROM WORK: ICD-10-CM

## 2023-05-31 PROCEDURE — 99213 OFFICE O/P EST LOW 20 MIN: CPT | Performed by: FAMILY MEDICINE

## 2023-05-31 RX ORDER — FERROUS SULFATE 7.5 MG/0.5
15 SYRINGE (EA) ORAL 3 TIMES DAILY
Qty: 150 ML | Refills: 4 | Status: SHIPPED | OUTPATIENT
Start: 2023-05-31

## 2023-05-31 SDOH — ECONOMIC STABILITY: FOOD INSECURITY: WITHIN THE PAST 12 MONTHS, THE FOOD YOU BOUGHT JUST DIDN'T LAST AND YOU DIDN'T HAVE MONEY TO GET MORE.: NEVER TRUE

## 2023-05-31 SDOH — ECONOMIC STABILITY: FOOD INSECURITY: WITHIN THE PAST 12 MONTHS, YOU WORRIED THAT YOUR FOOD WOULD RUN OUT BEFORE YOU GOT MONEY TO BUY MORE.: NEVER TRUE

## 2023-05-31 SDOH — ECONOMIC STABILITY: HOUSING INSECURITY
IN THE LAST 12 MONTHS, WAS THERE A TIME WHEN YOU DID NOT HAVE A STEADY PLACE TO SLEEP OR SLEPT IN A SHELTER (INCLUDING NOW)?: NO

## 2023-05-31 SDOH — ECONOMIC STABILITY: INCOME INSECURITY: HOW HARD IS IT FOR YOU TO PAY FOR THE VERY BASICS LIKE FOOD, HOUSING, MEDICAL CARE, AND HEATING?: NOT HARD AT ALL

## 2023-05-31 NOTE — PROGRESS NOTES
Chief Complaint   Patient presents with    Arm Pain     C/o left arm \"jumping\" x 2 days last week,  none today. Also requesting to discuss last lab results       1. Have you been to the ER, urgent care clinic since your last visit? Hospitalized since your last visit? No    2. Have you seen or consulted any other health care providers outside of the [address] [place] [address] since your last visit? Include any pap smears or colon screening.  No     The patient, Dede Gtz, identity was verified by name and

## 2023-05-31 NOTE — PROGRESS NOTES
Teena Brar (:  1985) is a Established patient, presenting virtually for evaluation of the following:    Assessment & Plan   Below is the assessment and plan developed based on review of pertinent history, physical exam, labs, studies, and medications. 1. Restlessness  -     ferrous sulfate (DOMINIQUE-IN-SOL) 75 (15 Fe) MG/ML solution; Take 1 mL by mouth 3 times daily, Disp-150 mL, R-4Normal  2. Insomnia due to medical condition  -     ferrous sulfate (DOMINIQUE-IN-SOL) 75 (15 Fe) MG/ML solution; Take 1 mL by mouth 3 times daily, Disp-150 mL, R-4Normal  3. Fatigue, unspecified type  -     ferrous sulfate (DOMINIQUE-IN-SOL) 75 (15 Fe) MG/ML solution; Take 1 mL by mouth 3 times daily, Disp-150 mL, R-4Normal  4. Encounter to obtain excuse from work  -     ferrous sulfate (DOMINIQUE-IN-SOL) 75 (15 Fe) MG/ML solution; Take 1 mL by mouth 3 times daily, Disp-150 mL, R-4Normal    Return in about 3 months (around 2023), or if symptoms worsen or fail to improve. Anemia with Heavy cycles,  Present with history of low blood count and currently stating that is not aware of any personal nor family history of any coagulopathy as far as the pt aware, the pt is on no anticoagulative meds, currently on no iron therapy,  patient has currently no gross bleeding, no blood in stool nor in urine, no tarry stool, no hx of SS or Thalassemia,  has no skin discoloration and there is no constipation,  no bleeding gum, almost a vegetarian, need work not feeling exhausted unable to obtain job unable to concentrate feeling tired and fatigue        Constitutional: no chills and fever,  , nad     HENT: no ear pain or nosebleeds. No blurred vision  Respiratory: no shortness of breath, wheezing cough   Cardiovascular: Has no chest pain, ,and racing heart . Gastrointestinal: No constipation, diarrhea, nausea and vomiting. Genitourinary: No frequency. Musculoskeletal: Negative for joint pain. Skin: no itching, no rash.    Neurological:

## 2023-09-12 ENCOUNTER — TELEMEDICINE (OUTPATIENT)
Age: 38
End: 2023-09-12
Payer: COMMERCIAL

## 2023-09-12 DIAGNOSIS — F31.81 BIPOLAR 2 DISORDER (HCC): ICD-10-CM

## 2023-09-12 DIAGNOSIS — G47.00 INSOMNIA, UNSPECIFIED TYPE: ICD-10-CM

## 2023-09-12 DIAGNOSIS — R45.1 RESTLESSNESS: ICD-10-CM

## 2023-09-12 DIAGNOSIS — R53.82 CHRONIC FATIGUE: Primary | ICD-10-CM

## 2023-09-12 PROCEDURE — 99214 OFFICE O/P EST MOD 30 MIN: CPT | Performed by: FAMILY MEDICINE

## 2023-09-12 NOTE — PROGRESS NOTES
Chief Complaint   Patient presents with    ED Follow-up     Back pain,,  requesting fmla and short term disability completed. Awaiting form        1. Have you been to the ER, urgent care clinic since your last visit? Hospitalized since your last visit? Yes [facility]: [facility]  Reason for visit: back pain    2. Have you seen or consulted any other health care providers outside of the [address] [place] [address] Avenue since your last visit? Include any pap smears or colon screening.  No       Health Maintenance Due   Topic Date Due    COVID-19 Vaccine (1) Never done    Varicella vaccine (1 of 2 - 2-dose childhood series) Never done    A1C test (Diabetic or Prediabetic)  Never done    Hepatitis C screen  Never done    Hepatitis B vaccine (2 of 3 - 19+ 3-dose series) 10/16/2008    Flu vaccine (1) 2023      The patient, Kevin Rm, identity was verified by name and

## 2023-09-12 NOTE — PROGRESS NOTES
Tiffanie Reyes, was evaluated through a synchronous (real-time) audio-video encounter. The patient (or guardian if applicable) is aware that this is a billable service, which includes applicable co-pays. This Virtual Visit was conducted with patient's (and/or legal guardian's) consent. Patient identification was verified, and a caregiver was present when appropriate. The patient was located at Home: [facility] [address]  Provider was located at [facility] (Appt Dept): [place] [address]      Tifafnie Reyes (:  1985) is a Established patient, presenting virtually for evaluation of the following:      Out for on leave of absence, for been worn out, feeling depressed and anxious,    Face to face disabiltiy examination 4 chronic medical conditions,   Pt has 1 form to be completed today,the Form is for the pt's short term disability which was givne to her by the HR to be completed by primary care physician few pages each     patient has multiple questions to be answered for the current serious major depressive disorder   patient continues to decline not functional at work not at home feeling very depressed,  unable to sleep denies suicidal homicidal ideation patient states that she is feeling hopeless not helpless feeling anxious irritated angry blaming herself,    denies any illicit nor alcohol abuse overall stating that she has been taking the prescribed medication helping slightly but at this time she is not getting better she also denies any unsafe place at house at this time,  has been off  her meds and not doing well overall    In addition, current condition made her more disable to attend work, she states that sometimes all her problem recurs At the same time and At those time she is able to do self feeding, bathing, grooming, dressing, toileting, pt is not being dependent on wheelchair At this time, But she will becomes fatigue but she Is

## 2023-09-20 ENCOUNTER — TELEPHONE (OUTPATIENT)
Age: 38
End: 2023-09-20

## 2023-09-20 NOTE — TELEPHONE ENCOUNTER
Patient would like for Alberto Jensen to give her another referral for physical therapy she can be reached @ [phone]

## 2023-09-22 ENCOUNTER — TELEMEDICINE (OUTPATIENT)
Age: 38
End: 2023-09-22
Payer: COMMERCIAL

## 2023-09-22 DIAGNOSIS — R45.1 RESTLESSNESS: ICD-10-CM

## 2023-09-22 DIAGNOSIS — G47.01 INSOMNIA DUE TO MEDICAL CONDITION: ICD-10-CM

## 2023-09-22 DIAGNOSIS — M54.50 ACUTE BILATERAL LOW BACK PAIN WITHOUT SCIATICA: ICD-10-CM

## 2023-09-22 DIAGNOSIS — R53.82 CHRONIC FATIGUE: ICD-10-CM

## 2023-09-22 DIAGNOSIS — G47.00 INSOMNIA, UNSPECIFIED TYPE: ICD-10-CM

## 2023-09-22 DIAGNOSIS — Z02.71 DISABILITY EXAMINATION: ICD-10-CM

## 2023-09-22 DIAGNOSIS — F31.81 BIPOLAR 2 DISORDER (HCC): Primary | ICD-10-CM

## 2023-09-22 PROCEDURE — 99214 OFFICE O/P EST MOD 30 MIN: CPT | Performed by: FAMILY MEDICINE

## 2023-09-22 SDOH — ECONOMIC STABILITY: FOOD INSECURITY: WITHIN THE PAST 12 MONTHS, YOU WORRIED THAT YOUR FOOD WOULD RUN OUT BEFORE YOU GOT MONEY TO BUY MORE.: NEVER TRUE

## 2023-09-22 SDOH — ECONOMIC STABILITY: INCOME INSECURITY: HOW HARD IS IT FOR YOU TO PAY FOR THE VERY BASICS LIKE FOOD, HOUSING, MEDICAL CARE, AND HEATING?: NOT VERY HARD

## 2023-09-22 SDOH — ECONOMIC STABILITY: FOOD INSECURITY: WITHIN THE PAST 12 MONTHS, THE FOOD YOU BOUGHT JUST DIDN'T LAST AND YOU DIDN'T HAVE MONEY TO GET MORE.: NEVER TRUE

## 2023-09-22 ASSESSMENT — PATIENT HEALTH QUESTIONNAIRE - PHQ9
3. TROUBLE FALLING OR STAYING ASLEEP: 3
7. TROUBLE CONCENTRATING ON THINGS, SUCH AS READING THE NEWSPAPER OR WATCHING TELEVISION: 1
1. LITTLE INTEREST OR PLEASURE IN DOING THINGS: 1
SUM OF ALL RESPONSES TO PHQ QUESTIONS 1-9: 9
SUM OF ALL RESPONSES TO PHQ QUESTIONS 1-9: 9
2. FEELING DOWN, DEPRESSED OR HOPELESS: 1
1. LITTLE INTEREST OR PLEASURE IN DOING THINGS: 1
10. IF YOU CHECKED OFF ANY PROBLEMS, HOW DIFFICULT HAVE THESE PROBLEMS MADE IT FOR YOU TO DO YOUR WORK, TAKE CARE OF THINGS AT HOME, OR GET ALONG WITH OTHER PEOPLE: 2
5. POOR APPETITE OR OVEREATING: 0
SUM OF ALL RESPONSES TO PHQ9 QUESTIONS 1 & 2: 2
SUM OF ALL RESPONSES TO PHQ QUESTIONS 1-9: 2
6. FEELING BAD ABOUT YOURSELF - OR THAT YOU ARE A FAILURE OR HAVE LET YOURSELF OR YOUR FAMILY DOWN: 1
SUM OF ALL RESPONSES TO PHQ QUESTIONS 1-9: 9
SUM OF ALL RESPONSES TO PHQ QUESTIONS 1-9: 2
SUM OF ALL RESPONSES TO PHQ QUESTIONS 1-9: 9
4. FEELING TIRED OR HAVING LITTLE ENERGY: 2
9. THOUGHTS THAT YOU WOULD BE BETTER OFF DEAD, OR OF HURTING YOURSELF: 0
8. MOVING OR SPEAKING SO SLOWLY THAT OTHER PEOPLE COULD HAVE NOTICED. OR THE OPPOSITE, BEING SO FIGETY OR RESTLESS THAT YOU HAVE BEEN MOVING AROUND A LOT MORE THAN USUAL: 0
SUM OF ALL RESPONSES TO PHQ QUESTIONS 1-9: 2
2. FEELING DOWN, DEPRESSED OR HOPELESS: 1
SUM OF ALL RESPONSES TO PHQ QUESTIONS 1-9: 2
SUM OF ALL RESPONSES TO PHQ9 QUESTIONS 1 & 2: 2

## 2023-10-11 ENCOUNTER — HOSPITAL ENCOUNTER (OUTPATIENT)
Facility: HOSPITAL | Age: 38
Setting detail: RECURRING SERIES
Discharge: HOME | End: 2023-10-14
Attending: FAMILY MEDICINE
Payer: COMMERCIAL

## 2023-10-11 PROCEDURE — 97162 PT EVAL MOD COMPLEX 30 MIN: CPT | Performed by: PHYSICAL THERAPIST

## 2023-10-11 PROCEDURE — 97110 THERAPEUTIC EXERCISES: CPT | Performed by: PHYSICAL THERAPIST

## 2023-10-11 NOTE — THERAPY EVALUATION
treatments:                         1.) The patient will have at most 5/10 pain with daily activities. 2.) The patient will improve her cervical AROM rotation to at least 55 degrees bilaterally to improve symmetry for functional mobility. 3.) The patient will improve their FOTO score from 63 to at least 68 to show improvements in functional mobility. Frequency / Duration: Patient to be seen 2 times per week for 24 treatments. Patient/ Caregiver education and instruction: Diagnosis, prognosis, self care, activity modification, and exercises   [x]  Plan of care has been reviewed with MANOHAR Mandujano, PT       10/11/2023       7:53 AM        ===================================================================  I certify that the above Therapy Services are being furnished while the patient is under my care. I agree with the treatment plan and certify that this therapy is necessary. Physician's Signature:_________________________   DATE:_________   TIME:________                           Audrey Pierre MD    ** Signature, Date and Time must be completed for valid certification **  Please sign and fax to [phone].   Thank you

## 2023-10-12 ENCOUNTER — TELEPHONE (OUTPATIENT)
Age: 38
End: 2023-10-12

## 2023-10-12 NOTE — TELEPHONE ENCOUNTER
Patient would like a call back about FMLA forms and she has another letter that needs completion by Dr. Meghann Membreno. Patient can be reached at [phone]. Please leave a detailed message on voice mail.

## 2023-10-17 ENCOUNTER — APPOINTMENT (OUTPATIENT)
Facility: HOSPITAL | Age: 38
End: 2023-10-17
Attending: FAMILY MEDICINE
Payer: COMMERCIAL

## 2023-10-18 ENCOUNTER — TELEPHONE (OUTPATIENT)
Facility: HOSPITAL | Age: 38
End: 2023-10-18

## 2023-10-18 DIAGNOSIS — C50.911 MALIGNANT NEOPLASM OF RIGHT FEMALE BREAST, UNSPECIFIED ESTROGEN RECEPTOR STATUS, UNSPECIFIED SITE OF BREAST (HCC): Primary | ICD-10-CM

## 2023-10-18 NOTE — PROGRESS NOTES
Order placed for BILATERAL breast MRI with and without contrast per verbal order of Dr. Nona De La Torre. She is scheduled for 10/20 at 10:00 am at [facility].        Nhung Wiggins RN

## 2023-10-18 NOTE — TELEPHONE ENCOUNTER
[facility]  Breast Navigator Encounter    Name:  Rai Peter      Age:  44 y.o. Diagnosis:    RIGHT breast cancer      Interdisciplinary Team:  Med-Onc:                          Surg-Onc:         Dr. Marisabel Putnam:         Plastics:           :     Nurse Navigator:  Rossy Blackburn RN, BSN, CBCN    Encounter type:  []Patient Initiated  []Navigator Follow-up []Pre-op  []Post-op  []Check-in Prior to First Treatment []Treatment Modality Change  [x]Initial Navigator Encounter []Other:       Narrative:     Reached out to patient for initial navigator contact. I explained what happens at the first consultation - an exam by the physician, a possible ultrasound, then complete discussion of surgical options and other treatment that may be considered. I encouraged the patient to bring  someone with her to this appointment. She will probably come alone. I told her that she is welcome to have someone on the phone listening if she wants to do that. Discussed that a breast MRI has been ordered by Alireza Valladares, and is the next step in her work-up. I explained to her the importance of this test with dense breast tissue. I explained the MRI procedure to her. She is claustrophobic, and I told her that she may reach out to her GYN or PCP and ask for some Xanax for the procedure if she things she needs it. I told her to make sure that she has a  if she chooses to take it. I scheduled her MRI for Friday, 10/20/2023 at 10:00 am at [facility] with arrival time of 9:30 am.  I explained where she goes to sign in. I will make sure that her imaging from  [address] is pushed to PACS before her appt. Her mom had breast cancer at about age 48, MGM had ovarian cancer, probably in her 62s. Her mom had genetic testing, and she was told that it was normal.  She is interested in getting genetic testing, and I told her Dr. Constance Rodriguez would talk to her about it.      I confirmed the

## 2023-10-19 ENCOUNTER — APPOINTMENT (OUTPATIENT)
Facility: HOSPITAL | Age: 38
End: 2023-10-19
Attending: FAMILY MEDICINE
Payer: COMMERCIAL

## 2023-10-20 ENCOUNTER — HOSPITAL ENCOUNTER (OUTPATIENT)
Facility: HOSPITAL | Age: 38
Discharge: HOME | End: 2023-10-20
Attending: SURGERY
Payer: COMMERCIAL

## 2023-10-20 DIAGNOSIS — C50.911 MALIGNANT NEOPLASM OF RIGHT FEMALE BREAST, UNSPECIFIED ESTROGEN RECEPTOR STATUS, UNSPECIFIED SITE OF BREAST (HCC): ICD-10-CM

## 2023-10-20 PROCEDURE — 6360000004 HC RX CONTRAST MEDICATION: Performed by: SURGERY

## 2023-10-20 PROCEDURE — C8908 MRI W/O FOL W/CONT, BREAST,: HCPCS

## 2023-10-20 PROCEDURE — 2709999900 MRI BREAST BILATERAL W WO CONTRAST

## 2023-10-20 PROCEDURE — A9579 GAD-BASE MR CONTRAST NOS,1ML: HCPCS | Performed by: SURGERY

## 2023-10-20 PROCEDURE — 2580000003 HC RX 258: Performed by: SURGERY

## 2023-10-20 RX ORDER — 0.9 % SODIUM CHLORIDE 0.9 %
100 INTRAVENOUS SOLUTION INTRAVENOUS ONCE
Status: CANCELLED | OUTPATIENT
Start: 2023-10-20 | End: 2023-10-20

## 2023-10-20 RX ORDER — SODIUM CHLORIDE 0.9 % (FLUSH) 0.9 %
10 SYRINGE (ML) INJECTION ONCE
Status: CANCELLED | OUTPATIENT
Start: 2023-10-20 | End: 2023-10-20

## 2023-10-20 RX ORDER — 0.9 % SODIUM CHLORIDE 0.9 %
100 INTRAVENOUS SOLUTION INTRAVENOUS ONCE
Status: COMPLETED | OUTPATIENT
Start: 2023-10-20 | End: 2023-10-20

## 2023-10-20 RX ADMIN — GADOTERIDOL 17 ML: 279.3 INJECTION, SOLUTION INTRAVENOUS at 11:08

## 2023-10-20 RX ADMIN — SODIUM CHLORIDE 100 ML: 9 INJECTION, SOLUTION INTRAVENOUS at 11:07

## 2023-10-24 ENCOUNTER — APPOINTMENT (OUTPATIENT)
Facility: HOSPITAL | Age: 38
End: 2023-10-24
Attending: FAMILY MEDICINE
Payer: COMMERCIAL

## 2023-10-25 ENCOUNTER — TELEPHONE (OUTPATIENT)
Age: 38
End: 2023-10-25

## 2023-10-25 ENCOUNTER — SOCIAL WORK (OUTPATIENT)
Age: 38
End: 2023-10-25

## 2023-10-25 ENCOUNTER — TELEPHONE (OUTPATIENT)
Facility: HOSPITAL | Age: 38
End: 2023-10-25

## 2023-10-25 ENCOUNTER — OFFICE VISIT (OUTPATIENT)
Age: 38
End: 2023-10-25

## 2023-10-25 VITALS — HEIGHT: 68 IN | WEIGHT: 185 LBS | BODY MASS INDEX: 28.04 KG/M2

## 2023-10-25 DIAGNOSIS — C50.911 BREAST CANCER METASTASIZED TO AXILLARY LYMPH NODE, RIGHT (HCC): Primary | ICD-10-CM

## 2023-10-25 DIAGNOSIS — C77.3 BREAST CANCER METASTASIZED TO AXILLARY LYMPH NODE, RIGHT (HCC): Primary | ICD-10-CM

## 2023-10-25 DIAGNOSIS — R93.89 ABNORMAL ULTRASOUND: ICD-10-CM

## 2023-10-25 NOTE — TELEPHONE ENCOUNTER
Dr. Blankenship Dows 11/2 at 1:45. CT and Bone Scan 10/30/2023 at [facility]. He should have results when she is seen.

## 2023-10-25 NOTE — PROGRESS NOTES
node. Brandon Sites IMPRESSION:  RIGHT BREAST:  1. Enhancing mass in the lower, inner quadrant, far posterior 3rd. There appears  to be artifact within this mass suggesting a biopsy clip is present; however,  this is not demonstrated on provided mammographic images. BI-RADS 6 - Known biopsy-proven malignancy. 2. There is enlarged axillary tail/axillary lymph node  BI-RADS 4 - Suspicious abnormality. 3. There is an enlarged, contralateral, left-sided internal mammary lymph node;  this may represent aberrant drainage as opposed to systemic disease. BI-RADS 4 - Suspicious abnormality. 4. There is a small area of nonmasslike enhancement just anterior and inferior  to the primary lesion by approximately 1.4 cm. There is no mammographic  correlate to this finding. Presumably this lesion would have been incidentally  imaged at either the time of diagnostic ultrasound or the time of  ultrasound-guided biopsy. This finding may represent postbiopsy change. BI-RADS 4 - Suspicious abnormality.     Past Medical History:   Diagnosis Date    Abnormal Papanicolaou smear of cervix     follow-up normal    ADHD (attention deficit hyperactivity disorder), combined type 2016    Bipolar 2 disorder ([address]) 10/25/2016    Chlamydia infection 2014    Chronic fatigue 2018    Concentric fading 2018    Depressed 2015    Diabetes ([address])     patient states pre-diabetes, gestational?    Disability examination 2015    Encounter for completion of form with patient 2016    Exposure to sexually transmitted disease (STD) 2014    Fatigue 2014    Genital herpes     no current outbreaks    Hypertension     During pregnacy    Intractable migraine with aura with status migrainosus 11/10/2016    Obesity (BMI 30.0-34.9) 2014    Poor concentration 2014     Past Surgical History:   Procedure Laterality Date    APPENDECTOMY  13    Laparoscopic Appendectomy     SECTION      ,

## 2023-10-25 NOTE — PROGRESS NOTES
the letter for her via [facility]. The pt shared that she was able to get ahead on her mortgage payment a couple of months ago, but expressed concern for continuing to pay this and her other bills while getting treatment. Explained that if she is approved, the grants can help with this. Also made pt aware that she can apply for heating and cooling assistance through [facility] as well. SW will be sure to follow up with pt if additional resources for this are available. Substance use / Mental health  The patient reports having insomnia, as well as situational anxiety. She shared that she had an assessment completed by her PCP in which they suggested the pt had bipolar disorder. Pt reports not having this diagnosis through her psychiatrist. She was taking medication for her anxiety and for the bipolar diagnosis until she was diagnosed with breast cancer. She is not currently taking the medication. Adjustment   Pt seems to be coping well with her diagnosis. She has a very positive outlook and perspective on her current situation. Pt was open to the conversation with SW, receptive throughout, and engaged in the discussion. Some of her primary concerns seem to be related to the financial aspects of her care and for daily living. Discussed the Cancer Anxiety and Cancer Diagnosis support groups with the patient. Also informed the patient on the [facility] and explained the services that are offered. She was very appreciative of the information and expressed her gratitude throughout. Encouraged the patient to contact SW with any questions or concerns. Plan:   Assist with healthcare provider forms for edwin applications. Provide ongoing psychosocial support as desired by the patient. SW remains available for further support and assistance.     Referral/Handouts:       Complementary therapies referral  Financial/Medication assistance referral   Support Groups

## 2023-10-25 NOTE — TELEPHONE ENCOUNTER
[facility]  Breast Navigator Encounter    Name:  Nichole Charlotn      Age:  44 y.o. Diagnosis:   RIGHT breast cancer      Interdisciplinary Team:  Med-Onc:           Dr. Denisse Deutsch               Surg-Onc:              Dr. Leah Mulligan:         Plastics:           :     Nurse Navigator:  Adam Oreilly RN, BSN, [facility]    Encounter type:  []Patient Initiated  [x]Navigator Follow-up []Pre-op  []Post-op  []Check-in Prior to First Treatment []Treatment Modality Change  []Initial Navigator Encounter []Other:       Narrative:    Called patient to let her know about her appointment with Dr. Denisse Deutsch on 11/2 (next Thursday) at 1:45 pm.   She has an appt with the multidisciplinary clinic at [facility] on 11/2, but she still wants to see Dr. Denisse Deutsch. Says that she made that appointment before she came in to see Dr. Dion Wyatt. I offered to help her schedule her staging scans. Called [facility] and scheduled her staging scans for 10/30/2023 (Monday) at [address] at 10:30 am and sign in at the information desk (enter through the ER). Appt at 11:00 in nuclear med for her injection for her bone scan. Appt at 1:00 pm for her CT Scan. I asked her to go to the CT department after she has her injection to see if she needs to drink any contrast for the study. I told her that the radiology staff will let her know. Appt at 2:00 pm in nuclear medicine for her bone scan. Advised no solid food after 8:30 am, clear liquids up until the time of the CT Scan. No NSAIDs the day of the test.      She repeated the information back to me. She will call if she has questions. She was appreciative of the assistance. Referrals/Handouts:    Assisted with scheduling staging scans prior to her appt next week.             Adam Oreilly RN, BSN, [facility]  Oncology Breast Navigator     [place] [address] [address] [place]  W: [phone]  F:

## 2023-10-25 NOTE — TELEPHONE ENCOUNTER
----- Message from [address] sent at 10/25/2023  9:54 AM EDT -----  Subject: Message to Provider    QUESTIONS  Information for Provider? pt dropped off paperwork for fmla on oct 13.   would like to know whats going on with it. have you faxed it to work? please call pt to [facility]. also, we rec'd a fax on 10/19 for short term   disability. pt would like if that was faxed back as well.  ---------------------------------------------------------------------------  --------------  [address] [place] [address] Chikis  [phone]; OK to leave message on voicemail  ---------------------------------------------------------------------------  --------------  SCRIPT ANSWERS  Relationship to Patient?  Self

## 2023-10-25 NOTE — TELEPHONE ENCOUNTER
Critical access hospital   OFFICE PROCEDURE PROGRESS NOTE        Chart reviewed for the following:   Johanna Dunne MD, have reviewed the History, Physical and updated the Allergic reactions for Екатерина Mckee     TIME OUT performed immediately prior to start of procedure:   Johanna Dunne MD, have performed the following reviews on Екатерина Mckee prior to the start of the procedure:            * Patient was identified by name and date of birth   * Agreement on procedure being performed was verified  * Risks and Benefits explained to the patient  * Procedure site verified and marked as necessary  * Patient was positioned for comfort  * Consent was signed and verified     Time: 1210pm      Date of procedure: 10/25/2023    Procedure performed by:  Maia Kaur MD    Provider assisted by: Lorrie Reynoso RN    Patient assisted by: self    How tolerated by patient: tolerated the procedure well with no complications    Post Procedural Pain Scale: 0    Comments: I have reviewed the provider's instructions with the patient, answering all questions to her satisfaction.

## 2023-10-26 ENCOUNTER — HOSPITAL ENCOUNTER (OUTPATIENT)
Facility: HOSPITAL | Age: 38
Setting detail: SPECIMEN
Discharge: HOME | End: 2023-10-29

## 2023-10-26 ENCOUNTER — APPOINTMENT (OUTPATIENT)
Facility: HOSPITAL | Age: 38
End: 2023-10-26
Attending: FAMILY MEDICINE
Payer: COMMERCIAL

## 2023-10-30 ENCOUNTER — HOSPITAL ENCOUNTER (OUTPATIENT)
Facility: HOSPITAL | Age: 38
Discharge: HOME | End: 2023-11-02
Attending: SURGERY
Payer: COMMERCIAL

## 2023-10-30 DIAGNOSIS — C50.911 BREAST CANCER METASTASIZED TO AXILLARY LYMPH NODE, RIGHT (HCC): ICD-10-CM

## 2023-10-30 DIAGNOSIS — C77.3 BREAST CANCER METASTASIZED TO AXILLARY LYMPH NODE, RIGHT (HCC): ICD-10-CM

## 2023-10-30 LAB — CREAT BLD-MCNC: 0.8 MG/DL (ref 0.6–1.3)

## 2023-10-30 PROCEDURE — A9503 TC99M MEDRONATE: HCPCS | Performed by: SURGERY

## 2023-10-30 PROCEDURE — 78306 BONE IMAGING WHOLE BODY: CPT | Performed by: SURGERY

## 2023-10-30 PROCEDURE — 71260 CT THORAX DX C+: CPT

## 2023-10-30 PROCEDURE — 82565 ASSAY OF CREATININE: CPT

## 2023-10-30 PROCEDURE — 3430000000 HC RX DIAGNOSTIC RADIOPHARMACEUTICAL: Performed by: SURGERY

## 2023-10-30 PROCEDURE — 6360000004 HC RX CONTRAST MEDICATION: Performed by: SURGERY

## 2023-10-30 RX ORDER — TC 99M MEDRONATE 20 MG/10ML
20 INJECTION, POWDER, LYOPHILIZED, FOR SOLUTION INTRAVENOUS ONCE
Status: COMPLETED | OUTPATIENT
Start: 2023-10-30 | End: 2023-10-30

## 2023-10-30 RX ADMIN — TC 99M MEDRONATE 20 MILLICURIE: 20 INJECTION, POWDER, LYOPHILIZED, FOR SOLUTION INTRAVENOUS at 11:22

## 2023-10-30 RX ADMIN — IOPAMIDOL 100 ML: 755 INJECTION, SOLUTION INTRAVENOUS at 12:15

## 2023-10-31 ENCOUNTER — HOSPITAL ENCOUNTER (OUTPATIENT)
Facility: HOSPITAL | Age: 38
Setting detail: RECURRING SERIES
End: 2023-10-31
Attending: FAMILY MEDICINE
Payer: COMMERCIAL

## 2023-11-02 ENCOUNTER — OFFICE VISIT (OUTPATIENT)
Age: 38
End: 2023-11-02
Payer: COMMERCIAL

## 2023-11-02 ENCOUNTER — CLINICAL DOCUMENTATION (OUTPATIENT)
Age: 38
End: 2023-11-02

## 2023-11-02 VITALS
HEIGHT: 68 IN | OXYGEN SATURATION: 100 % | HEART RATE: 74 BPM | WEIGHT: 186.6 LBS | TEMPERATURE: 98.6 F | BODY MASS INDEX: 28.28 KG/M2 | SYSTOLIC BLOOD PRESSURE: 157 MMHG | DIASTOLIC BLOOD PRESSURE: 88 MMHG

## 2023-11-02 DIAGNOSIS — C50.911 MALIGNANT NEOPLASM OF RIGHT BREAST IN FEMALE, ESTROGEN RECEPTOR POSITIVE, UNSPECIFIED SITE OF BREAST (HCC): Primary | ICD-10-CM

## 2023-11-02 DIAGNOSIS — Z79.899 ENCOUNTER FOR MONITORING CARDIOTOXIC DRUG THERAPY: ICD-10-CM

## 2023-11-02 DIAGNOSIS — Z17.0 MALIGNANT NEOPLASM OF RIGHT BREAST IN FEMALE, ESTROGEN RECEPTOR POSITIVE, UNSPECIFIED SITE OF BREAST (HCC): Primary | ICD-10-CM

## 2023-11-02 DIAGNOSIS — Z51.81 ENCOUNTER FOR MONITORING CARDIOTOXIC DRUG THERAPY: ICD-10-CM

## 2023-11-02 PROCEDURE — 99205 OFFICE O/P NEW HI 60 MIN: CPT | Performed by: INTERNAL MEDICINE

## 2023-11-02 PROCEDURE — 3079F DIAST BP 80-89 MM HG: CPT | Performed by: INTERNAL MEDICINE

## 2023-11-02 PROCEDURE — 3077F SYST BP >= 140 MM HG: CPT | Performed by: INTERNAL MEDICINE

## 2023-11-02 RX ORDER — LIDOCAINE AND PRILOCAINE 25; 25 MG/G; MG/G
CREAM TOPICAL
Qty: 30 G | Refills: 2 | Status: SHIPPED | OUTPATIENT
Start: 2023-11-02

## 2023-11-02 RX ORDER — ONDANSETRON 4 MG/1
4 TABLET, ORALLY DISINTEGRATING ORAL 3 TIMES DAILY PRN
Qty: 40 TABLET | Refills: 1 | Status: SHIPPED | OUTPATIENT
Start: 2023-11-02

## 2023-11-02 RX ORDER — PROCHLORPERAZINE MALEATE 10 MG
10 TABLET ORAL EVERY 6 HOURS PRN
Qty: 40 TABLET | Refills: 2 | Status: SHIPPED | OUTPATIENT
Start: 2023-11-02

## 2023-11-02 NOTE — PATIENT INSTRUCTIONS
On the day of your chemo you will report to the infusion [place], [facility] [address], the nurses will access your port and draw labs, they will then send you over to our office, [facility] [address]. We will look at the labs, discuss any symptoms/ potential side effects, and answer any questions. We will then send you back to the infusion center to complete your infusion. Please take all of your medications as prescribed and eat breakfast prior to your arrival.    Aba Noon is not provided, but there is limited snacks/drinks available for the patient only. Please be mindful that due to ZJMAM-13 the visitor policy is subject to change at any time. Please call the infusion center at [phone] to inquire on visitor policy. You do not need a  to your infusions but it is best to have someone on standby in case you are feeling too ill to drive. We sent over anti nausea medication, two different ones, to your pharmacy for you to  to have on hand in case you are to get nauseated while at home after your treatments. If one is too expensive then just  the less expensive. We send two as we do not know which insurances pay for what and if both not expensive it is good to get both that way if one isn't working you could try the other drug. We also sent over a numbing cream to your pharmacy. This will be something you use 30 min prior to infusion time to your port site and cover with plastic wrap. This will help numb the port before the nurse accesses your port in the infusion center for blood work.

## 2023-11-02 NOTE — PROGRESS NOTES
Juan Delgadillo is a 44 y.o. female here for new patient appt for breast cancer. Referred by Dr Dr Claritza Bingham  Pt here alone today. 1. Have you been to the ER, urgent care clinic since your last visit? Hospitalized since your last visit? New Pt    2. Have you seen or consulted any other health care providers outside of the [address] [place] [address] since your last visit? Include any pap smears or colon screening.   New Pt
Per Martina Code from Dr. Lizy Parks ODT 4MG EVERY 8 HOURS AS NEEDED FOR NAUSEA/AND OR VOMITING. QUANTITY 40 REFILL 2PER VORB from Dr. Krzysztof Stoll lidocaine-prilocaine (EMLA) cream apply to affected area as needed for pain dispense 30 refill 2PER VORB FROM DR AUGUSTO ANANDAZINE 10MG TAB, TAKE 1/2 TAB BY MOUTH Q6HPRN FOR NAUSEA WAS ORDERED. QUANTITY 40 REFILL 2  PER VORB from Dr. Krzysztof Stoll 2D ECHO COMPLETE ADULT TTE W OR WO CONTRAST as a one time order. Pt is very nervous about getting a port. I explained the pros and cons. She will think about getting the port and follow up with our office on her decision. She said she has always been more holistic however he understands that she has to have chemo. She is afraid of having a foreign device in her, afraid about blood clot risk. She is interested in speaking to someone who has gone through this or going through this. She said she has a friend who runs a program for breast cancer but she had triple negative disease. Pt says she doesn't know anybody other than her who has been through treatment for cancer. Chemo education provided. Pt had to leave to pick her kids up but is aware to call with any questions and to get her ECHO scheduled.
response rate compared with those given trastuzumab plus docetaxel, without substantial differences in tolerability (49% vs 29%). [facility] Oncology, Lary Velasquez. Al. Jan 2012]. Akil Johnson is another phase II trial where patients Her 2 + locally advanced breast cancer were randomized to receive dual Her blockade with Trastuzumab/Pertuzumab in combination with Taxane and anthracycline. The rates of pCR ranged between 40-50% in the dual Her blockade. (Louis AZUL Et al. Annals of Oncology 2013)   Taken together dual Her blockade is the most effective way of treating Her 2 positive patients with locally advanced breast cancer. Thus I have elected to administer [address] + P    I counseled the patient regarding the chemotherapy. Discussion included side-effect, toxicity, benefit and risks of chemotherapy. I gave her handouts for education regarding the chemotherapy drugs. She understood the expected side-effect which includes alopecia, nausea, peripheral neuropathy, neutropenic fever, heart failure, anemia, need for transfusion among other things. After weighing the benefit and risks, the patient agreed to start chemotherapy. Taxotere 75 mg/2 IV q3w  Carboplatin AUC 6 IV q3w  Trastuzumab 8 mg/kg followed by 6 mg/kg IV q3w  Pertuzumab 840 mg followed by 420 mg IV q3w          Plan:       > Port-a-cath placement  > Refer to [facility] to start chemotherapy  > CA 27.29, Vit D level, CBC, CMP  > 2-D Echo to assess LVEF  > Plan to start TCHP in 2-3 weeks. > She would need a repeat breast MRI after 6 cycles of chemotherapy to assess response  > Lumpectomy/Mastectomy after appropriate response to libby-adjuvant chemotherapy  > Will continue Her2 directed therapy for a total of 1 year.        Signed By: Jasper Blackwood MD     November 2, 2023

## 2023-11-02 NOTE — PROGRESS NOTES
Oncology Social Work  Psychosocial Assessment    Reason for Assessment:      [] Social Work Referral [x] Initial Assessment [x] New Diagnosis [] Other    [facility]/2/2023     2:07 PM   Demographics   Marital Status Single       Sources of Information:    [x]Patient  []Family  []Staff  []Medical Record    Mental Status:    [x]Alert  []Lethargic  []Unresponsive   [] Unable to assess   Oriented to:  [x]Person  [x]Place  [x]Time  [x]Situation      Relationship Status:  [x]Single  []  []Significant Other/Life Partner  []  []  []    Living Circumstances:  []Lives Alone  [x]Family/Significant Other in Household  []Roommates  [x]Children in the Home  []Paid Caregivers  []Assisted Living Facility/Group [address] [place] [address]  []Homeless  []Incarcerated  []Environmental/Care Concerns  []Other:    Employment Status:  [x]Employed Full-time []Employed Part-time []Homemaker  [] Retired [x] Short-Term Disability [] [address]  [] Unemployed   [] SSI   [] SSDI  [] Self-Employment    Barriers to Learning:    []Language  []Developmental  []Cognitive  []Altered Mental Status  []Visual/Hearing Impairment  []Unable to Read/Write  []Motivational  [] Challenges Understanding Medical Jargon []No Barriers Identified      Financial/Legal Concerns:    []Uninsured  [x]Limited Income/Resources  []Non-Citizen  []Food Insecurity []No Concerns Identified   []Other:    Religion/Spiritual/Existential:  Does patient have any spiritual or Pentecostal beliefs? [] Yes [] No  Is the patient involved in a spiritual, kem or Pentecostal community?  [] Yes [] No  Patient expressing spiritual/existential angst? [] Yes [] No  Notes:    Support System:    Identified Support Person/Group:  []Strong  []Fair  [x]Limited    Coping with Illness:   [x]  Coping Well  [] Challenges Coping with Serious Illness [] Situational Depression [x] Situational Anxiety [] Anticipatory Grief  [] Recent Loss []

## 2023-11-03 ENCOUNTER — TELEPHONE (OUTPATIENT)
Age: 38
End: 2023-11-03

## 2023-11-03 NOTE — TELEPHONE ENCOUNTER
Called pt this morning to check in and offer assistance with edwin applications documents. Pt did not answer, left VM.

## 2023-11-07 ENCOUNTER — TELEPHONE (OUTPATIENT)
Age: 38
End: 2023-11-07

## 2023-11-07 NOTE — TELEPHONE ENCOUNTER
[facility]  Oncology Social Work  Encounter     Patient Name:  Brian Hoff     Medical History: dx breast cancer    Advance Directives:     Narrative: calling to offer support and follow up about scheduling echo and if she had decided about scheduling port or not.      Barriers to Care:     Plan:     Referral/Handouts:   Complementary therapies referral

## 2023-11-08 ENCOUNTER — TELEMEDICINE (OUTPATIENT)
Age: 38
End: 2023-11-08
Payer: COMMERCIAL

## 2023-11-08 DIAGNOSIS — Z02.71 DISABILITY EXAMINATION: ICD-10-CM

## 2023-11-08 DIAGNOSIS — G47.01 INSOMNIA DUE TO MEDICAL CONDITION: ICD-10-CM

## 2023-11-08 DIAGNOSIS — C50.911 MALIGNANT NEOPLASM OF RIGHT FEMALE BREAST, UNSPECIFIED ESTROGEN RECEPTOR STATUS, UNSPECIFIED SITE OF BREAST (HCC): ICD-10-CM

## 2023-11-08 DIAGNOSIS — F41.1 GAD (GENERALIZED ANXIETY DISORDER): Primary | ICD-10-CM

## 2023-11-08 DIAGNOSIS — R41.840 POOR CONCENTRATION: ICD-10-CM

## 2023-11-08 DIAGNOSIS — R53.82 CHRONIC FATIGUE: ICD-10-CM

## 2023-11-08 DIAGNOSIS — F33.9 RECURRENT DEPRESSION (HCC): ICD-10-CM

## 2023-11-08 PROCEDURE — 99214 OFFICE O/P EST MOD 30 MIN: CPT | Performed by: FAMILY MEDICINE

## 2023-11-08 ASSESSMENT — PATIENT HEALTH QUESTIONNAIRE - PHQ9
SUM OF ALL RESPONSES TO PHQ QUESTIONS 1-9: 2
2. FEELING DOWN, DEPRESSED OR HOPELESS: 1
1. LITTLE INTEREST OR PLEASURE IN DOING THINGS: 1
SUM OF ALL RESPONSES TO PHQ9 QUESTIONS 1 & 2: 2
SUM OF ALL RESPONSES TO PHQ QUESTIONS 1-9: 2

## 2023-11-08 NOTE — PROGRESS NOTES
Tiffanie Reyes, was evaluated through a synchronous (real-time) audio-video encounter. The patient (or guardian if applicable) is aware that this is a billable service, which includes applicable co-pays. This Virtual Visit was conducted with patient's (and/or legal guardian's) consent. Patient identification was verified, and a caregiver was present when appropriate. 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 disabling the patient to concentrate and do appropriate cognitive function has been unable to return to work due to current medical condition patient has seen psychiatrist counselor and specialist with breast surgeon for the current medical condition fortunately has had normal mammogram in  and abnormal in  patient sent devastated with the teams and stating that she has an important job when she is unable to function denies suicidal homicidal ideation has no illusion hallucination has been compliant with medical advice and medications, in addition patient has had a disabled mother with leg amputation uncontrolled diabetic state home has been providing care for her at the house currently she is feeling overwhelmed and not able to perform at her current working environment requesting family leave of absence for the serious medical conditions    Constitutional: no chills and fever,  , nad     HENT: no ear pain or nosebleeds.  No blurred vision  Respiratory: no

## 2023-11-10 ENCOUNTER — HOSPITAL ENCOUNTER (OUTPATIENT)
Facility: HOSPITAL | Age: 38
Discharge: HOME | End: 2023-11-10
Attending: INTERNAL MEDICINE
Payer: COMMERCIAL

## 2023-11-10 DIAGNOSIS — Z79.899 ENCOUNTER FOR MONITORING CARDIOTOXIC DRUG THERAPY: ICD-10-CM

## 2023-11-10 DIAGNOSIS — Z51.81 ENCOUNTER FOR MONITORING CARDIOTOXIC DRUG THERAPY: ICD-10-CM

## 2023-11-10 DIAGNOSIS — Z17.0 MALIGNANT NEOPLASM OF RIGHT BREAST IN FEMALE, ESTROGEN RECEPTOR POSITIVE, UNSPECIFIED SITE OF BREAST (HCC): ICD-10-CM

## 2023-11-10 DIAGNOSIS — C50.911 MALIGNANT NEOPLASM OF RIGHT BREAST IN FEMALE, ESTROGEN RECEPTOR POSITIVE, UNSPECIFIED SITE OF BREAST (HCC): ICD-10-CM

## 2023-11-10 LAB
ECHO AO ROOT DIAM: 2.5 CM
ECHO AV AREA PEAK VELOCITY: 2.2 CM2
ECHO AV PEAK GRADIENT: 12 MMHG
ECHO AV PEAK VELOCITY: 1.7 M/S
ECHO AV VELOCITY RATIO: 0.76
ECHO EST RA PRESSURE: 5 MMHG
ECHO LA DIAMETER: 3.8 CM
ECHO LA TO AORTIC ROOT RATIO: 1.52
ECHO LV FRACTIONAL SHORTENING: 29 % (ref 28–44)
ECHO LV INTERNAL DIMENSION DIASTOLIC: 4.5 CM (ref 3.9–5.3)
ECHO LV INTERNAL DIMENSION SYSTOLIC: 3.2 CM
ECHO LV IVSD: 1 CM (ref 0.6–0.9)
ECHO LV MASS 2D: 153.3 G (ref 67–162)
ECHO LV POSTERIOR WALL DIASTOLIC: 1 CM (ref 0.6–0.9)
ECHO LV RELATIVE WALL THICKNESS RATIO: 0.44
ECHO LVOT AREA: 2.8 CM2
ECHO LVOT DIAM: 1.9 CM
ECHO LVOT PEAK GRADIENT: 7 MMHG
ECHO LVOT PEAK VELOCITY: 1.3 M/S
ECHO MV A VELOCITY: 0.81 M/S
ECHO MV AREA PHT: 2.8 CM2
ECHO MV E DECELERATION TIME (DT): 266.8 MS
ECHO MV E VELOCITY: 0.99 M/S
ECHO MV E/A RATIO: 1.22
ECHO MV PRESSURE HALF TIME (PHT): 77.4 MS
ECHO PULMONARY ARTERY END DIASTOLIC PRESSURE: 9 MMHG
ECHO PV MAX VELOCITY: 0.8 M/S
ECHO PV PEAK GRADIENT: 2 MMHG
ECHO RIGHT VENTRICULAR SYSTOLIC PRESSURE (RVSP): 27 MMHG
ECHO TV REGURGITANT MAX VELOCITY: 2.32 M/S
ECHO TV REGURGITANT PEAK GRADIENT: 21 MMHG

## 2023-11-10 PROCEDURE — 93306 TTE W/DOPPLER COMPLETE: CPT

## 2023-11-10 PROCEDURE — 93306 TTE W/DOPPLER COMPLETE: CPT | Performed by: INTERNAL MEDICINE

## 2023-11-14 ENCOUNTER — TELEPHONE (OUTPATIENT)
Age: 38
End: 2023-11-14

## 2023-11-14 NOTE — TELEPHONE ENCOUNTER
[employer]  Oncology Social Work  Encounter     Patient Name:  Garlan Gitelman     Medical History: dx breast cancer    Advance Directives:     Narrative: pt struggling to wrap her head around chemo/port. ...says it's a lot.       Barriers to Care:     Plan:     Referral/Handouts:

## 2023-11-14 NOTE — TELEPHONE ENCOUNTER
[id number] [address]  Social Work Encounter     Patient Name:  Erik Reynolds      Medical History: Breast cancer    Advance Directives: Not on file; conversation deferred. Narrative:   Called the patient this afternoon to check-in and follow up. Pt had 2nd opinion at [facility]. Pt seemed to be anxious as she shared that she is having a hard time fathoming port placement, chemo, and tx plan overall, describing it as \"a lot\". She said that it is not a matter of who she receives care from [facility] or [facility]) but rather trying to feel comfortable with the tx plan. A med onc team member encouraged the pt to contact someone who has been through this process for support and to hear personal experience. Pt plans to do this soon. Pt shared she has been doing some of her own research, SW offered to provide resources for the pt such as support groups, educational information, etc. Pt politely declined for now. Let pt know that SW can assist with the [place]win applications if she decides to apply as well. Encouraged her to reach out with any questions or concerns. She was very appreciative. Barriers to Care: Anxiety about treatment plan. Plan:  Ongoing psychosocial support as desired by the patient. SW remains available for further support and assistance.     Referral/Handouts:           DOMINIQUE Gold  [facility]     [address] [place] [address]  W: [phone]  [email or web address]   Good Help to Those in [facility] SPECIALTY [facility]

## 2023-11-21 DIAGNOSIS — G47.01 INSOMNIA DUE TO MEDICAL CONDITION: ICD-10-CM

## 2023-11-21 DIAGNOSIS — Z02.89 ENCOUNTER TO OBTAIN EXCUSE FROM WORK: ICD-10-CM

## 2023-11-21 DIAGNOSIS — R53.83 FATIGUE, UNSPECIFIED TYPE: ICD-10-CM

## 2023-11-21 DIAGNOSIS — R45.1 RESTLESSNESS: ICD-10-CM

## 2023-11-21 RX ORDER — FERROUS SULFATE 7.5 MG/0.5
15 SYRINGE (EA) ORAL 3 TIMES DAILY
Qty: 150 ML | Refills: 4 | Status: SHIPPED | OUTPATIENT
Start: 2023-11-21

## 2023-11-21 NOTE — TELEPHONE ENCOUNTER
Patient call for refill of iron. sukumar Kam    Last appointment: 11/8/23 MD Nancy Christian   Next appointment: None  Previous refill encounter(s): 5/31/23 150ml + 4    Requested Prescriptions     Pending Prescriptions Disp Refills    ferrous sulfate (DOMINIQUE-IN-SOL) 75 (15 Fe) MG/ML solution 150 mL 4     Sig: Take 1 mL by mouth 3 times daily     For Pharmacy Admin Tracking Only    Program: Medication Refill  CPA in place:    Recommendation Provided To:    Intervention Detail: New Rx: 1, reason: Patient Preference  Intervention Accepted By:   Leonardo Lui Closed?:    Time Spent (min): 5

## 2023-12-05 ENCOUNTER — TELEPHONE (OUTPATIENT)
Age: 38
End: 2023-12-05

## 2023-12-05 NOTE — TELEPHONE ENCOUNTER
12/5/23 - Called patient to review VUS on genetic testing results. No answer. Mailbox full. 12/8/23 - Called patient. No answer. Mailbox full. 12/12/23 - called patient. No answer. Able to leave voicemail per HIPAA with clinically negative genetic testing. Stated that full report was in [address] [place] [address] and asked that she call the office with questions or concerns.

## 2023-12-29 ENCOUNTER — TELEPHONE (OUTPATIENT)
Age: 38
End: 2023-12-29

## 2023-12-29 NOTE — TELEPHONE ENCOUNTER
Spoke to patient; two identifiers confirmed. Patient stated she has someone for behavioral health med mgmt. She is looking for a therapist. Jenni Dill contact information for Haleigh Hanson and Hector Ramon. Opportunity for questions given.

## 2024-01-10 PROCEDURE — 99285 EMERGENCY DEPT VISIT HI MDM: CPT

## 2024-01-10 ASSESSMENT — PAIN DESCRIPTION - LOCATION: LOCATION: CHEST

## 2024-01-10 ASSESSMENT — PAIN DESCRIPTION - DESCRIPTORS: DESCRIPTORS: DISCOMFORT

## 2024-01-10 ASSESSMENT — PAIN SCALES - GENERAL: PAINLEVEL_OUTOF10: 4

## 2024-01-11 ENCOUNTER — HOSPITAL ENCOUNTER (EMERGENCY)
Facility: HOSPITAL | Age: 39
Discharge: HOME | End: 2024-01-11
Attending: EMERGENCY MEDICINE
Payer: COMMERCIAL

## 2024-01-11 ENCOUNTER — APPOINTMENT (OUTPATIENT)
Facility: HOSPITAL | Age: 39
End: 2024-01-11
Payer: COMMERCIAL

## 2024-01-11 VITALS
HEART RATE: 84 BPM | WEIGHT: 185.19 LBS | BODY MASS INDEX: 28.07 KG/M2 | DIASTOLIC BLOOD PRESSURE: 99 MMHG | TEMPERATURE: 98.4 F | OXYGEN SATURATION: 100 % | SYSTOLIC BLOOD PRESSURE: 146 MMHG | RESPIRATION RATE: 18 BRPM | HEIGHT: 68 IN

## 2024-01-11 DIAGNOSIS — R42 DIZZINESS: ICD-10-CM

## 2024-01-11 DIAGNOSIS — R00.2 PALPITATIONS: ICD-10-CM

## 2024-01-11 DIAGNOSIS — R42 LIGHTHEADEDNESS: Primary | ICD-10-CM

## 2024-01-11 LAB
ALBUMIN SERPL-MCNC: 3.8 G/DL (ref 3.5–5)
ALBUMIN/GLOB SERPL: 1 (ref 1.1–2.2)
ALP SERPL-CCNC: 88 U/L (ref 45–117)
ALT SERPL-CCNC: 28 U/L (ref 12–78)
ANION GAP SERPL CALC-SCNC: 1 MMOL/L (ref 5–15)
APPEARANCE UR: CLEAR
AST SERPL-CCNC: 20 U/L (ref 15–37)
BACTERIA URNS QL MICRO: NEGATIVE /HPF
BASOPHILS # BLD: 0 K/UL (ref 0–0.1)
BASOPHILS NFR BLD: 0 % (ref 0–1)
BILIRUB SERPL-MCNC: 0.4 MG/DL (ref 0.2–1)
BILIRUB UR QL: NEGATIVE
BUN SERPL-MCNC: 11 MG/DL (ref 6–20)
BUN/CREAT SERPL: 11 (ref 12–20)
CALCIUM SERPL-MCNC: 9.2 MG/DL (ref 8.5–10.1)
CHLORIDE SERPL-SCNC: 107 MMOL/L (ref 97–108)
CO2 SERPL-SCNC: 30 MMOL/L (ref 21–32)
COLOR UR: NORMAL
CREAT SERPL-MCNC: 0.99 MG/DL (ref 0.55–1.02)
D DIMER PPP FEU-MCNC: 0.31 MG/L FEU (ref 0–0.65)
DIFFERENTIAL METHOD BLD: ABNORMAL
EKG ATRIAL RATE: 81 BPM
EKG DIAGNOSIS: NORMAL
EKG P AXIS: 65 DEGREES
EKG P-R INTERVAL: 146 MS
EKG Q-T INTERVAL: 368 MS
EKG QRS DURATION: 92 MS
EKG QTC CALCULATION (BAZETT): 427 MS
EKG R AXIS: 75 DEGREES
EKG T AXIS: 82 DEGREES
EKG VENTRICULAR RATE: 81 BPM
EOSINOPHIL # BLD: 0 K/UL (ref 0–0.4)
EOSINOPHIL NFR BLD: 0 % (ref 0–7)
EPITH CASTS URNS QL MICRO: NORMAL /LPF
ERYTHROCYTE [DISTWIDTH] IN BLOOD BY AUTOMATED COUNT: 15.3 % (ref 11.5–14.5)
GLOBULIN SER CALC-MCNC: 4 G/DL (ref 2–4)
GLUCOSE SERPL-MCNC: 99 MG/DL (ref 65–100)
GLUCOSE UR STRIP.AUTO-MCNC: NEGATIVE MG/DL
HCT VFR BLD AUTO: 35.2 % (ref 35–47)
HGB BLD-MCNC: 11 G/DL (ref 11.5–16)
HGB UR QL STRIP: NEGATIVE
HYALINE CASTS URNS QL MICRO: NORMAL /LPF (ref 0–2)
IMM GRANULOCYTES # BLD AUTO: 0 K/UL (ref 0–0.04)
IMM GRANULOCYTES NFR BLD AUTO: 0 % (ref 0–0.5)
KETONES UR QL STRIP.AUTO: NEGATIVE MG/DL
LEUKOCYTE ESTERASE UR QL STRIP.AUTO: NEGATIVE
LYMPHOCYTES # BLD: 1.3 K/UL (ref 0.8–3.5)
LYMPHOCYTES NFR BLD: 17 % (ref 12–49)
MCH RBC QN AUTO: 25.6 PG (ref 26–34)
MCHC RBC AUTO-ENTMCNC: 31.3 G/DL (ref 30–36.5)
MCV RBC AUTO: 82.1 FL (ref 80–99)
METAMYELOCYTES NFR BLD MANUAL: 1 %
MONOCYTES # BLD: 0.2 K/UL (ref 0–1)
MONOCYTES NFR BLD: 2 % (ref 5–13)
NEUTS SEG # BLD: 6.2 K/UL (ref 1.8–8)
NEUTS SEG NFR BLD: 80 % (ref 32–75)
NITRITE UR QL STRIP.AUTO: NEGATIVE
NRBC # BLD: 0 K/UL (ref 0–0.01)
NRBC BLD-RTO: 0 PER 100 WBC
PH UR STRIP: 6.5 (ref 5–8)
PLATELET # BLD AUTO: 143 K/UL (ref 150–400)
PMV BLD AUTO: 11.9 FL (ref 8.9–12.9)
POTASSIUM SERPL-SCNC: 3.9 MMOL/L (ref 3.5–5.1)
PROT SERPL-MCNC: 7.8 G/DL (ref 6.4–8.2)
PROT UR STRIP-MCNC: NEGATIVE MG/DL
RBC # BLD AUTO: 4.29 M/UL (ref 3.8–5.2)
RBC #/AREA URNS HPF: NORMAL /HPF (ref 0–5)
RBC MORPH BLD: ABNORMAL
SODIUM SERPL-SCNC: 138 MMOL/L (ref 136–145)
SP GR UR REFRACTOMETRY: 1.01
TROPONIN I SERPL HS-MCNC: 9 NG/L (ref 0–51)
URINE CULTURE IF INDICATED: NORMAL
UROBILINOGEN UR QL STRIP.AUTO: 0.2 EU/DL (ref 0.2–1)
WBC # BLD AUTO: 7.8 K/UL (ref 3.6–11)
WBC URNS QL MICRO: NORMAL /HPF (ref 0–4)

## 2024-01-11 PROCEDURE — 85025 COMPLETE CBC W/AUTO DIFF WBC: CPT

## 2024-01-11 PROCEDURE — 85379 FIBRIN DEGRADATION QUANT: CPT

## 2024-01-11 PROCEDURE — 80053 COMPREHEN METABOLIC PANEL: CPT

## 2024-01-11 PROCEDURE — 81001 URINALYSIS AUTO W/SCOPE: CPT

## 2024-01-11 PROCEDURE — 71046 X-RAY EXAM CHEST 2 VIEWS: CPT

## 2024-01-11 PROCEDURE — 84484 ASSAY OF TROPONIN QUANT: CPT

## 2024-01-11 PROCEDURE — 36415 COLL VENOUS BLD VENIPUNCTURE: CPT

## 2024-01-11 ASSESSMENT — ENCOUNTER SYMPTOMS
ABDOMINAL PAIN: 0
SHORTNESS OF BREATH: 0
NAUSEA: 0
DIARRHEA: 0
CHEST TIGHTNESS: 1
VOMITING: 0

## 2024-01-11 ASSESSMENT — HEART SCORE: ECG: 0

## 2024-01-11 NOTE — DISCHARGE INSTRUCTIONS
It was a pleasure taking care of you in our Emergency Department today.  We know that when you come to [facility], you are entrusting us with your health, comfort, and safety.  Our physicians and nurses honor that trust, and truly appreciate the opportunity to care for you and your loved ones.      We also value your feedback.  If you receive a survey about your Emergency Department experience today, please fill it out.  We care about our patients' feedback, and we listen to what you have to say.  Thank you!      Dr. Luz Elena Genao MD.

## 2024-01-11 NOTE — ED PROVIDER NOTES
EMERGENCY DEPARTMENT HISTORY AND PHYSICAL EXAM     ----------------------------------------------------------------------------  Please note that this dictation was completed with [employer], the computer voice recognition software.  Quite often unanticipated grammatical, syntax, homophones, and other interpretive errors are inadvertently transcribed by the computer software.  Please disregard these errors.  Please excuse any errors that have escaped final proofreading  ----------------------------------------------------------------------------      Date: 1/11/2024  Patient Name: Екатерина Mckee      HISTORY OF PRESENT ILLNESS     Chief Complaint   Patient presents with    Dizziness     Pt ambulatory into triage with complaints that approximately 30 minutes ago she got out of the bath tub, felt a warm sensation in her chest, she got dizzy and lightheaded, and felt her heart racing.        Palpitations       History obtainted from:  Patient    Other independent source of history: none    HPI: Екатерина Mckee is a 38 y.o. female, with significant pmhx of breast cancer undergoing chemotherapy treatment, bipolar disorder, hypertension, diabetes, who presents via private vehicle to the ED with c/o transient episode of lightheaded sensation with associated chest warmth that started when getting out of the tub approximately 30 minutes prior to presenting to the emergency department.  [place] as though she is having palpitations with a heart racing sensation that spontaneously resolved.  Patient has no symptoms at time of my evaluation.  Notes eating chili earlier today and was unsure if this was related and caused her to have some reflux.  Notes that she is currently under the care of of [facility] for her breast cancer having received her first chemotherapy treatment approximately 2 weeks ago with her neck scheduled in 1 week.  Denies fever, nausea, vomiting or diarrhea.      PCP: Wang Glynn MD    Allergy List:   No Known

## 2024-02-18 ENCOUNTER — APPOINTMENT (OUTPATIENT)
Facility: HOSPITAL | Age: 39
End: 2024-02-18
Payer: COMMERCIAL

## 2024-02-18 ENCOUNTER — HOSPITAL ENCOUNTER (EMERGENCY)
Facility: HOSPITAL | Age: 39
Discharge: HOME | End: 2024-02-18
Attending: STUDENT IN AN ORGANIZED HEALTH CARE EDUCATION/TRAINING PROGRAM
Payer: COMMERCIAL

## 2024-02-18 VITALS
BODY MASS INDEX: 27.37 KG/M2 | HEART RATE: 98 BPM | WEIGHT: 180 LBS | RESPIRATION RATE: 16 BRPM | OXYGEN SATURATION: 98 % | TEMPERATURE: 99.7 F | SYSTOLIC BLOOD PRESSURE: 141 MMHG | DIASTOLIC BLOOD PRESSURE: 99 MMHG

## 2024-02-18 DIAGNOSIS — B02.9 HERPES ZOSTER WITHOUT COMPLICATION: Primary | ICD-10-CM

## 2024-02-18 DIAGNOSIS — C50.911 MALIGNANT NEOPLASM OF RIGHT BREAST IN FEMALE, ESTROGEN RECEPTOR POSITIVE, UNSPECIFIED SITE OF BREAST (HCC): ICD-10-CM

## 2024-02-18 DIAGNOSIS — R53.83 OTHER FATIGUE: ICD-10-CM

## 2024-02-18 DIAGNOSIS — Z17.0 MALIGNANT NEOPLASM OF RIGHT BREAST IN FEMALE, ESTROGEN RECEPTOR POSITIVE, UNSPECIFIED SITE OF BREAST (HCC): ICD-10-CM

## 2024-02-18 LAB
ALBUMIN SERPL-MCNC: 4.3 G/DL (ref 3.5–5)
ALBUMIN/GLOB SERPL: 1.1 (ref 1.1–2.2)
ALP SERPL-CCNC: 137 U/L (ref 45–117)
ALT SERPL-CCNC: 27 U/L (ref 12–78)
ANION GAP SERPL CALC-SCNC: 7 MMOL/L (ref 5–15)
APPEARANCE UR: CLEAR
AST SERPL-CCNC: 22 U/L (ref 15–37)
BACTERIA URNS QL MICRO: ABNORMAL /HPF
BASOPHILS # BLD: 0 K/UL (ref 0–0.1)
BASOPHILS NFR BLD: 0 % (ref 0–1)
BILIRUB SERPL-MCNC: 0.4 MG/DL (ref 0.2–1)
BILIRUB UR QL: NEGATIVE
BUN SERPL-MCNC: 13 MG/DL (ref 6–20)
BUN/CREAT SERPL: 11 (ref 12–20)
CALCIUM SERPL-MCNC: 9.6 MG/DL (ref 8.5–10.1)
CHLORIDE SERPL-SCNC: 97 MMOL/L (ref 97–108)
CO2 SERPL-SCNC: 32 MMOL/L (ref 21–32)
COLOR UR: ABNORMAL
CREAT SERPL-MCNC: 1.21 MG/DL (ref 0.55–1.02)
DIFFERENTIAL METHOD BLD: ABNORMAL
EOSINOPHIL # BLD: 0 K/UL (ref 0–0.4)
EOSINOPHIL NFR BLD: 0 % (ref 0–7)
EPITH CASTS URNS QL MICRO: ABNORMAL /LPF
ERYTHROCYTE [DISTWIDTH] IN BLOOD BY AUTOMATED COUNT: 19.5 % (ref 11.5–14.5)
FLUAV AG NPH QL IA: NEGATIVE
FLUBV AG NOSE QL IA: NEGATIVE
GLOBULIN SER CALC-MCNC: 3.8 G/DL (ref 2–4)
GLUCOSE SERPL-MCNC: 149 MG/DL (ref 65–100)
GLUCOSE UR STRIP.AUTO-MCNC: NEGATIVE MG/DL
HCG UR QL: NEGATIVE
HCT VFR BLD AUTO: 40.6 % (ref 35–47)
HGB BLD-MCNC: 12.7 G/DL (ref 11.5–16)
HGB UR QL STRIP: ABNORMAL
IMM GRANULOCYTES # BLD AUTO: 0 K/UL (ref 0–0.04)
IMM GRANULOCYTES NFR BLD AUTO: 0 % (ref 0–0.5)
KETONES UR QL STRIP.AUTO: NEGATIVE MG/DL
LACTATE BLD-SCNC: 1.63 MMOL/L (ref 0.4–2)
LEUKOCYTE ESTERASE UR QL STRIP.AUTO: ABNORMAL
LYMPHOCYTES # BLD: 1.8 K/UL (ref 0.8–3.5)
LYMPHOCYTES NFR BLD: 9 % (ref 12–49)
MCH RBC QN AUTO: 27.8 PG (ref 26–34)
MCHC RBC AUTO-ENTMCNC: 31.3 G/DL (ref 30–36.5)
MCV RBC AUTO: 88.8 FL (ref 80–99)
METAMYELOCYTES NFR BLD MANUAL: 2 %
MONOCYTES # BLD: 2.4 K/UL (ref 0–1)
MONOCYTES NFR BLD: 12 % (ref 5–13)
NEUTS BAND NFR BLD MANUAL: 3 %
NEUTS SEG # BLD: 15.2 K/UL (ref 1.8–8)
NEUTS SEG NFR BLD: 74 % (ref 32–75)
NITRITE UR QL STRIP.AUTO: NEGATIVE
NRBC # BLD: 0.03 K/UL (ref 0–0.01)
NRBC BLD-RTO: 0.2 PER 100 WBC
PH UR STRIP: 6.5 (ref 5–8)
PLATELET # BLD AUTO: 288 K/UL (ref 150–400)
PMV BLD AUTO: 10.5 FL (ref 8.9–12.9)
POTASSIUM SERPL-SCNC: 4.2 MMOL/L (ref 3.5–5.1)
PROT SERPL-MCNC: 8.1 G/DL (ref 6.4–8.2)
PROT UR STRIP-MCNC: NEGATIVE MG/DL
RBC # BLD AUTO: 4.57 M/UL (ref 3.8–5.2)
RBC #/AREA URNS HPF: ABNORMAL /HPF (ref 0–5)
RBC MORPH BLD: ABNORMAL
SARS-COV-2 RDRP RESP QL NAA+PROBE: NOT DETECTED
SODIUM SERPL-SCNC: 136 MMOL/L (ref 136–145)
SOURCE: NORMAL
SP GR UR REFRACTOMETRY: 1.01
URINE CULTURE IF INDICATED: ABNORMAL
UROBILINOGEN UR QL STRIP.AUTO: 0.2 EU/DL (ref 0.2–1)
WBC # BLD AUTO: 19.8 K/UL (ref 3.6–11)
WBC URNS QL MICRO: ABNORMAL /HPF (ref 0–4)

## 2024-02-18 PROCEDURE — 83605 ASSAY OF LACTIC ACID: CPT

## 2024-02-18 PROCEDURE — 6360000002 HC RX W HCPCS: Performed by: STUDENT IN AN ORGANIZED HEALTH CARE EDUCATION/TRAINING PROGRAM

## 2024-02-18 PROCEDURE — 2580000003 HC RX 258: Performed by: STUDENT IN AN ORGANIZED HEALTH CARE EDUCATION/TRAINING PROGRAM

## 2024-02-18 PROCEDURE — 96375 TX/PRO/DX INJ NEW DRUG ADDON: CPT

## 2024-02-18 PROCEDURE — 36415 COLL VENOUS BLD VENIPUNCTURE: CPT

## 2024-02-18 PROCEDURE — 99285 EMERGENCY DEPT VISIT HI MDM: CPT

## 2024-02-18 PROCEDURE — 81025 URINE PREGNANCY TEST: CPT

## 2024-02-18 PROCEDURE — 80053 COMPREHEN METABOLIC PANEL: CPT

## 2024-02-18 PROCEDURE — 87086 URINE CULTURE/COLONY COUNT: CPT

## 2024-02-18 PROCEDURE — 87040 BLOOD CULTURE FOR BACTERIA: CPT

## 2024-02-18 PROCEDURE — 96361 HYDRATE IV INFUSION ADD-ON: CPT

## 2024-02-18 PROCEDURE — 81001 URINALYSIS AUTO W/SCOPE: CPT

## 2024-02-18 PROCEDURE — 87804 INFLUENZA ASSAY W/OPTIC: CPT

## 2024-02-18 PROCEDURE — 6370000000 HC RX 637 (ALT 250 FOR IP): Performed by: STUDENT IN AN ORGANIZED HEALTH CARE EDUCATION/TRAINING PROGRAM

## 2024-02-18 PROCEDURE — 85025 COMPLETE CBC W/AUTO DIFF WBC: CPT

## 2024-02-18 PROCEDURE — 74176 CT ABD & PELVIS W/O CONTRAST: CPT

## 2024-02-18 PROCEDURE — 96374 THER/PROPH/DIAG INJ IV PUSH: CPT

## 2024-02-18 PROCEDURE — 93005 ELECTROCARDIOGRAM TRACING: CPT | Performed by: STUDENT IN AN ORGANIZED HEALTH CARE EDUCATION/TRAINING PROGRAM

## 2024-02-18 PROCEDURE — 96376 TX/PRO/DX INJ SAME DRUG ADON: CPT

## 2024-02-18 PROCEDURE — 87147 CULTURE TYPE IMMUNOLOGIC: CPT

## 2024-02-18 PROCEDURE — 71045 X-RAY EXAM CHEST 1 VIEW: CPT

## 2024-02-18 PROCEDURE — 87635 SARS-COV-2 COVID-19 AMP PRB: CPT

## 2024-02-18 RX ORDER — MORPHINE SULFATE 4 MG/ML
4 INJECTION, SOLUTION INTRAMUSCULAR; INTRAVENOUS ONCE
Status: COMPLETED | OUTPATIENT
Start: 2024-02-18 | End: 2024-02-18

## 2024-02-18 RX ORDER — CEPHALEXIN 500 MG/1
500 CAPSULE ORAL 4 TIMES DAILY
Qty: 20 CAPSULE | Refills: 0 | Status: SHIPPED | OUTPATIENT
Start: 2024-02-18 | End: 2024-02-23

## 2024-02-18 RX ORDER — KETOROLAC TROMETHAMINE 30 MG/ML
15 INJECTION, SOLUTION INTRAMUSCULAR; INTRAVENOUS
Status: COMPLETED | OUTPATIENT
Start: 2024-02-18 | End: 2024-02-18

## 2024-02-18 RX ORDER — VALACYCLOVIR HYDROCHLORIDE 1 G/1
1000 TABLET, FILM COATED ORAL 3 TIMES DAILY
Qty: 42 TABLET | Refills: 0 | Status: SHIPPED | OUTPATIENT
Start: 2024-02-18 | End: 2024-02-18

## 2024-02-18 RX ORDER — ACETAMINOPHEN 500 MG
500 TABLET ORAL
Status: COMPLETED | OUTPATIENT
Start: 2024-02-18 | End: 2024-02-18

## 2024-02-18 RX ORDER — DIPHENHYDRAMINE HYDROCHLORIDE 50 MG/ML
25 INJECTION INTRAMUSCULAR; INTRAVENOUS
Status: COMPLETED | OUTPATIENT
Start: 2024-02-18 | End: 2024-02-18

## 2024-02-18 RX ORDER — OXYCODONE HYDROCHLORIDE 5 MG/1
5 TABLET ORAL EVERY 6 HOURS PRN
Qty: 9 TABLET | Refills: 0 | Status: SHIPPED | OUTPATIENT
Start: 2024-02-18 | End: 2024-02-21

## 2024-02-18 RX ORDER — 0.9 % SODIUM CHLORIDE 0.9 %
1000 INTRAVENOUS SOLUTION INTRAVENOUS ONCE
Status: COMPLETED | OUTPATIENT
Start: 2024-02-18 | End: 2024-02-18

## 2024-02-18 RX ORDER — PROCHLORPERAZINE EDISYLATE 5 MG/ML
10 INJECTION INTRAMUSCULAR; INTRAVENOUS ONCE
Status: COMPLETED | OUTPATIENT
Start: 2024-02-18 | End: 2024-02-18

## 2024-02-18 RX ORDER — ONDANSETRON 2 MG/ML
4 INJECTION INTRAMUSCULAR; INTRAVENOUS ONCE
Status: COMPLETED | OUTPATIENT
Start: 2024-02-18 | End: 2024-02-18

## 2024-02-18 RX ORDER — PROCHLORPERAZINE MALEATE 10 MG
10 TABLET ORAL EVERY 6 HOURS PRN
Qty: 40 TABLET | Refills: 2 | Status: SHIPPED | OUTPATIENT
Start: 2024-02-18

## 2024-02-18 RX ORDER — VALACYCLOVIR HYDROCHLORIDE 1 G/1
1000 TABLET, FILM COATED ORAL 3 TIMES DAILY
Qty: 42 TABLET | Refills: 0 | Status: SHIPPED | OUTPATIENT
Start: 2024-02-18 | End: 2024-03-03

## 2024-02-18 RX ORDER — CEPHALEXIN 500 MG/1
500 CAPSULE ORAL 4 TIMES DAILY
Qty: 20 CAPSULE | Refills: 0 | Status: SHIPPED | OUTPATIENT
Start: 2024-02-18 | End: 2024-02-18

## 2024-02-18 RX ADMIN — MORPHINE SULFATE 4 MG: 4 INJECTION, SOLUTION INTRAMUSCULAR; INTRAVENOUS at 13:21

## 2024-02-18 RX ADMIN — PROCHLORPERAZINE EDISYLATE 10 MG: 5 INJECTION INTRAMUSCULAR; INTRAVENOUS at 19:15

## 2024-02-18 RX ADMIN — ACETAMINOPHEN 500 MG: 500 TABLET ORAL at 21:28

## 2024-02-18 RX ADMIN — MORPHINE SULFATE 4 MG: 4 INJECTION, SOLUTION INTRAMUSCULAR; INTRAVENOUS at 15:42

## 2024-02-18 RX ADMIN — SODIUM CHLORIDE 1000 ML: 9 INJECTION, SOLUTION INTRAVENOUS at 13:21

## 2024-02-18 RX ADMIN — DIPHENHYDRAMINE HYDROCHLORIDE 25 MG: 50 INJECTION INTRAMUSCULAR; INTRAVENOUS at 19:15

## 2024-02-18 RX ADMIN — ONDANSETRON 4 MG: 2 INJECTION INTRAMUSCULAR; INTRAVENOUS at 13:21

## 2024-02-18 RX ADMIN — KETOROLAC TROMETHAMINE 15 MG: 30 INJECTION, SOLUTION INTRAMUSCULAR; INTRAVENOUS at 15:42

## 2024-02-18 ASSESSMENT — PAIN SCALES - GENERAL
PAINLEVEL_OUTOF10: 4
PAINLEVEL_OUTOF10: 10

## 2024-02-18 ASSESSMENT — PAIN - FUNCTIONAL ASSESSMENT: PAIN_FUNCTIONAL_ASSESSMENT: 0-10

## 2024-02-18 NOTE — DISCHARGE INSTRUCTIONS
If your rash starts extending UP or DOWN or you start feeling worse please return to the ER. Please have this rechecked in the next 3 days. If you experience any new or concerning symptoms or your symptoms change or worsen, return to the ER as soon as possible.

## 2024-02-18 NOTE — ED PROVIDER NOTES
Vitals:    Vitals:    02/18/24 1847 02/18/24 2011 02/18/24 2100 02/18/24 2115   BP:   (!) 141/99    Pulse: (!) 104 89 (!) 108 98   Resp: 18  16    Temp:       TempSrc:       SpO2: 99%  98%    Weight:                Patient was given the following medications:  Medications   sodium chloride 0.9 % bolus 1,000 mL (0 mLs IntraVENous Stopped 2/18/24 1919)   ondansetron (ZOFRAN) injection 4 mg (4 mg IntraVENous Given 2/18/24 1321)   morphine sulfate (PF) injection 4 mg (4 mg IntraVENous Given 2/18/24 1321)   ketorolac (TORADOL) injection 15 mg (15 mg IntraVENous Given 2/18/24 1542)   morphine sulfate (PF) injection 4 mg (4 mg IntraVENous Given 2/18/24 1542)   prochlorperazine (COMPAZINE) injection 10 mg (10 mg IntraVENous Given 2/18/24 1915)   diphenhydrAMINE (BENADRYL) injection 25 mg (25 mg IntraVENous Given 2/18/24 1915)   acetaminophen (TYLENOL) tablet 500 mg (500 mg Oral Given 2/18/24 2128)       CONSULTS: (Who and What was discussed)  IP CONSULT TO ONCOLOGY      Chronic Conditions: Breast cancer currently receiving immunotherapy chemotherapy    Social Determinants affecting Dx or Tx: None    Counseling:    Records Reviewed (source and summary of external notes): Nursing Notes and Old Medical Records    CC/HPI Summary, DDx, ED Course, and Reassessment: Patient presenting with chief complaint of generalized bodyaches, chills, she is tachycardic but afebrile, recently received Neulasta shot due to leukopenia, consider sepsis, COVID, flu, bacteremia, pneumonia, urinary tract infection.  The rash is consistent with shingles. There is no evidence of secondary infection. It is isolated to one dermatome. She has no evidence of disseminated zoster or complications from zoster at this time  She is immunosuppressed at high risk.  Will complete full sepsis workup including UA assess for urinary tract infection.  Will treat with fluids, Zofran morphine for lower back pain.     Reviewed initial labs, leukocytosis to 19 (unclear

## 2024-02-19 LAB
BACTERIA SPEC CULT: ABNORMAL
CC UR VC: ABNORMAL
SERVICE CMNT-IMP: ABNORMAL

## 2024-02-20 LAB
EKG ATRIAL RATE: 111 BPM
EKG DIAGNOSIS: NORMAL
EKG P AXIS: 72 DEGREES
EKG P-R INTERVAL: 126 MS
EKG Q-T INTERVAL: 308 MS
EKG QRS DURATION: 78 MS
EKG QTC CALCULATION (BAZETT): 418 MS
EKG R AXIS: 49 DEGREES
EKG T AXIS: 43 DEGREES
EKG VENTRICULAR RATE: 111 BPM

## 2024-02-24 LAB
BACTERIA SPEC CULT: NORMAL
BACTERIA SPEC CULT: NORMAL
SERVICE CMNT-IMP: NORMAL
SERVICE CMNT-IMP: NORMAL

## 2024-03-24 ENCOUNTER — HOSPITAL ENCOUNTER (EMERGENCY)
Facility: HOSPITAL | Age: 39
Discharge: HOME | End: 2024-03-24
Attending: EMERGENCY MEDICINE
Payer: COMMERCIAL

## 2024-03-24 ENCOUNTER — APPOINTMENT (OUTPATIENT)
Facility: HOSPITAL | Age: 39
End: 2024-03-24
Payer: COMMERCIAL

## 2024-03-24 VITALS
HEART RATE: 76 BPM | HEIGHT: 68 IN | OXYGEN SATURATION: 100 % | SYSTOLIC BLOOD PRESSURE: 135 MMHG | WEIGHT: 174.38 LBS | DIASTOLIC BLOOD PRESSURE: 88 MMHG | BODY MASS INDEX: 26.43 KG/M2 | RESPIRATION RATE: 18 BRPM | TEMPERATURE: 98.6 F

## 2024-03-24 DIAGNOSIS — R10.9 FLANK PAIN: ICD-10-CM

## 2024-03-24 DIAGNOSIS — N12 PYELONEPHRITIS: Primary | ICD-10-CM

## 2024-03-24 LAB
ANION GAP SERPL CALC-SCNC: 5 MMOL/L (ref 5–15)
APPEARANCE UR: CLEAR
BACTERIA URNS QL MICRO: NEGATIVE /HPF
BASOPHILS # BLD: 0 K/UL (ref 0–0.1)
BASOPHILS NFR BLD: 0 % (ref 0–1)
BILIRUB UR QL: NEGATIVE
BUN SERPL-MCNC: 16 MG/DL (ref 6–20)
BUN/CREAT SERPL: 15 (ref 12–20)
CALCIUM SERPL-MCNC: 8.6 MG/DL (ref 8.5–10.1)
CHLORIDE SERPL-SCNC: 105 MMOL/L (ref 97–108)
CO2 SERPL-SCNC: 29 MMOL/L (ref 21–32)
COLOR UR: ABNORMAL
CREAT SERPL-MCNC: 1.07 MG/DL (ref 0.55–1.02)
DIFFERENTIAL METHOD BLD: ABNORMAL
EOSINOPHIL # BLD: 0 K/UL (ref 0–0.4)
EOSINOPHIL NFR BLD: 0 % (ref 0–7)
EPITH CASTS URNS QL MICRO: ABNORMAL /LPF
ERYTHROCYTE [DISTWIDTH] IN BLOOD BY AUTOMATED COUNT: 21.1 % (ref 11.5–14.5)
GLUCOSE SERPL-MCNC: 106 MG/DL (ref 65–100)
GLUCOSE UR STRIP.AUTO-MCNC: NEGATIVE MG/DL
HCG UR QL: NEGATIVE
HCT VFR BLD AUTO: 25.1 % (ref 35–47)
HGB BLD-MCNC: 7.9 G/DL (ref 11.5–16)
HGB UR QL STRIP: ABNORMAL
IMM GRANULOCYTES # BLD AUTO: 0 K/UL (ref 0–0.04)
IMM GRANULOCYTES NFR BLD AUTO: 0 % (ref 0–0.5)
KETONES UR QL STRIP.AUTO: NEGATIVE MG/DL
LEUKOCYTE ESTERASE UR QL STRIP.AUTO: ABNORMAL
LYMPHOCYTES # BLD: 1.5 K/UL (ref 0.8–3.5)
LYMPHOCYTES NFR BLD: 10 % (ref 12–49)
MCH RBC QN AUTO: 30.4 PG (ref 26–34)
MCHC RBC AUTO-ENTMCNC: 31.5 G/DL (ref 30–36.5)
MCV RBC AUTO: 96.5 FL (ref 80–99)
METAMYELOCYTES NFR BLD MANUAL: 4 %
MONOCYTES # BLD: 0.2 K/UL (ref 0–1)
MONOCYTES NFR BLD: 1 % (ref 5–13)
NEUTS BAND NFR BLD MANUAL: 4 %
NEUTS SEG # BLD: 12.8 K/UL (ref 1.8–8)
NEUTS SEG NFR BLD: 81 % (ref 32–75)
NITRITE UR QL STRIP.AUTO: NEGATIVE
NRBC # BLD: 0 K/UL (ref 0–0.01)
NRBC BLD-RTO: 0 PER 100 WBC
PH UR STRIP: 6 (ref 5–8)
PLATELET # BLD AUTO: 366 K/UL (ref 150–400)
PMV BLD AUTO: 10.3 FL (ref 8.9–12.9)
POTASSIUM SERPL-SCNC: 3.1 MMOL/L (ref 3.5–5.1)
PROT UR STRIP-MCNC: 100 MG/DL
RBC # BLD AUTO: 2.6 M/UL (ref 3.8–5.2)
RBC #/AREA URNS HPF: >100 /HPF (ref 0–5)
RBC MORPH BLD: ABNORMAL
SODIUM SERPL-SCNC: 139 MMOL/L (ref 136–145)
SP GR UR REFRACTOMETRY: 1.01
URINE CULTURE IF INDICATED: ABNORMAL
UROBILINOGEN UR QL STRIP.AUTO: 0.2 EU/DL (ref 0.2–1)
WBC # BLD AUTO: 15 K/UL (ref 3.6–11)
WBC URNS QL MICRO: ABNORMAL /HPF (ref 0–4)

## 2024-03-24 PROCEDURE — 85025 COMPLETE CBC W/AUTO DIFF WBC: CPT

## 2024-03-24 PROCEDURE — 6360000002 HC RX W HCPCS: Performed by: EMERGENCY MEDICINE

## 2024-03-24 PROCEDURE — 81025 URINE PREGNANCY TEST: CPT

## 2024-03-24 PROCEDURE — 81001 URINALYSIS AUTO W/SCOPE: CPT

## 2024-03-24 PROCEDURE — 6370000000 HC RX 637 (ALT 250 FOR IP): Performed by: EMERGENCY MEDICINE

## 2024-03-24 PROCEDURE — 2580000003 HC RX 258: Performed by: EMERGENCY MEDICINE

## 2024-03-24 PROCEDURE — 96365 THER/PROPH/DIAG IV INF INIT: CPT

## 2024-03-24 PROCEDURE — 96375 TX/PRO/DX INJ NEW DRUG ADDON: CPT

## 2024-03-24 PROCEDURE — 99284 EMERGENCY DEPT VISIT MOD MDM: CPT

## 2024-03-24 PROCEDURE — 74176 CT ABD & PELVIS W/O CONTRAST: CPT

## 2024-03-24 PROCEDURE — 80048 BASIC METABOLIC PNL TOTAL CA: CPT

## 2024-03-24 RX ORDER — CEPHALEXIN 500 MG/1
500 CAPSULE ORAL 3 TIMES DAILY
Qty: 21 CAPSULE | Refills: 0 | Status: SHIPPED | OUTPATIENT
Start: 2024-03-24 | End: 2024-03-31

## 2024-03-24 RX ORDER — 0.9 % SODIUM CHLORIDE 0.9 %
500 INTRAVENOUS SOLUTION INTRAVENOUS ONCE
Status: COMPLETED | OUTPATIENT
Start: 2024-03-24 | End: 2024-03-24

## 2024-03-24 RX ORDER — OXYCODONE HYDROCHLORIDE AND ACETAMINOPHEN 5; 325 MG/1; MG/1
1 TABLET ORAL EVERY 6 HOURS PRN
Qty: 12 TABLET | Refills: 0 | Status: SHIPPED | OUTPATIENT
Start: 2024-03-24 | End: 2024-03-27

## 2024-03-24 RX ORDER — OXYCODONE HYDROCHLORIDE AND ACETAMINOPHEN 5; 325 MG/1; MG/1
1 TABLET ORAL
Status: COMPLETED | OUTPATIENT
Start: 2024-03-24 | End: 2024-03-24

## 2024-03-24 RX ORDER — KETOROLAC TROMETHAMINE 30 MG/ML
15 INJECTION, SOLUTION INTRAMUSCULAR; INTRAVENOUS
Status: COMPLETED | OUTPATIENT
Start: 2024-03-24 | End: 2024-03-24

## 2024-03-24 RX ADMIN — KETOROLAC TROMETHAMINE 15 MG: 30 INJECTION, SOLUTION INTRAMUSCULAR at 13:23

## 2024-03-24 RX ADMIN — SODIUM CHLORIDE 1000 MG: 900 INJECTION INTRAVENOUS at 13:54

## 2024-03-24 RX ADMIN — SODIUM CHLORIDE 500 ML: 9 INJECTION, SOLUTION INTRAVENOUS at 13:56

## 2024-03-24 RX ADMIN — OXYCODONE AND ACETAMINOPHEN 1 TABLET: 5; 325 TABLET ORAL at 14:04

## 2024-03-24 ASSESSMENT — PAIN - FUNCTIONAL ASSESSMENT: PAIN_FUNCTIONAL_ASSESSMENT: ACTIVITIES ARE NOT PREVENTED

## 2024-03-24 ASSESSMENT — PAIN SCALES - GENERAL
PAINLEVEL_OUTOF10: 8
PAINLEVEL_OUTOF10: 10
PAINLEVEL_OUTOF10: 8

## 2024-03-24 ASSESSMENT — PAIN DESCRIPTION - DESCRIPTORS: DESCRIPTORS: ACHING

## 2024-03-24 ASSESSMENT — PAIN DESCRIPTION - ORIENTATION: ORIENTATION: LEFT

## 2024-03-24 ASSESSMENT — PAIN DESCRIPTION - LOCATION: LOCATION: FLANK

## 2024-03-24 NOTE — ED PROVIDER NOTES
Constitutional:       General: She is not in acute distress.     Appearance: Normal appearance. She is not ill-appearing.   HENT:      Head: Normocephalic and atraumatic.      Mouth/Throat:      Mouth: Mucous membranes are moist.   Eyes:      General: No scleral icterus.     Extraocular Movements: Extraocular movements intact.      Pupils: Pupils are equal, round, and reactive to light.   Cardiovascular:      Rate and Rhythm: Normal rate and regular rhythm.   Pulmonary:      Effort: Pulmonary effort is normal. No respiratory distress.      Breath sounds: Normal breath sounds. No stridor. No wheezing, rhonchi or rales.      Comments: Port left upper chest  Abdominal:      General: There is no distension.      Palpations: Abdomen is soft.      Tenderness: There is no abdominal tenderness. There is left CVA tenderness.   Musculoskeletal:      Cervical back: Normal range of motion and neck supple.      Right lower leg: No edema.      Left lower leg: No edema.   Skin:     General: Skin is warm and dry.      Capillary Refill: Capillary refill takes less than 2 seconds.   Neurological:      Mental Status: She is alert and oriented to person, place, and time.      Cranial Nerves: No cranial nerve deficit.   Psychiatric:         Mood and Affect: Mood normal.         Behavior: Behavior normal.          DIAGNOSTIC RESULTS   LABS:     Recent Results (from the past 24 hour(s))   Urinalysis with Reflex to Culture    Collection Time: 03/24/24 12:27 PM    Specimen: Urine   Result Value Ref Range    Color, UA RED      Appearance CLEAR CLEAR      Specific Gravity, UA 1.012      pH, Urine 6.0 5.0 - 8.0      Protein,  (A) NEG mg/dL    Glucose, UA Negative NEG mg/dL    Ketones, Urine Negative NEG mg/dL    Bilirubin Urine Negative NEG      Blood, Urine LARGE (A) NEG      Urobilinogen, Urine 0.2 0.2 - 1.0 EU/dL    Nitrite, Urine Negative NEG      Leukocyte Esterase, Urine SMALL (A) NEG      WBC, UA 5-10 0 - 4 /hpf    RBC, UA >100

## 2024-03-24 NOTE — ED NOTES
Nohelia BLUE reviewed discharge instructions with the patient.  The patient verbalized understanding.  All questions and concerns were addressed.  The patient declined a wheelchair and is discharged ambulatory in the care of family members with instructions and prescriptions in hand.  Pt is alert and oriented x 4.  Respirations are clear and unlabored.

## 2024-03-26 ENCOUNTER — TELEMEDICINE (OUTPATIENT)
Age: 39
End: 2024-03-26
Payer: COMMERCIAL

## 2024-03-26 DIAGNOSIS — R53.82 CHRONIC FATIGUE: ICD-10-CM

## 2024-03-26 DIAGNOSIS — G43.111 INTRACTABLE MIGRAINE WITH AURA WITH STATUS MIGRAINOSUS: ICD-10-CM

## 2024-03-26 DIAGNOSIS — F33.9 RECURRENT DEPRESSION (HCC): ICD-10-CM

## 2024-03-26 DIAGNOSIS — Z02.71 DISABILITY EXAMINATION: ICD-10-CM

## 2024-03-26 DIAGNOSIS — M14.60 NEUROPATHIC ARTHROPATHY: ICD-10-CM

## 2024-03-26 DIAGNOSIS — R41.840 POOR CONCENTRATION: ICD-10-CM

## 2024-03-26 DIAGNOSIS — H53.123: ICD-10-CM

## 2024-03-26 DIAGNOSIS — G47.01 INSOMNIA DUE TO MEDICAL CONDITION: ICD-10-CM

## 2024-03-26 DIAGNOSIS — R20.9 COLD EXTREMITY WITHOUT PERIPHERAL VASCULAR DISEASE: ICD-10-CM

## 2024-03-26 DIAGNOSIS — C50.911 MALIGNANT NEOPLASM OF RIGHT FEMALE BREAST, UNSPECIFIED ESTROGEN RECEPTOR STATUS, UNSPECIFIED SITE OF BREAST (HCC): Primary | ICD-10-CM

## 2024-03-26 DIAGNOSIS — G89.29 OTHER CHRONIC PAIN: ICD-10-CM

## 2024-03-26 PROCEDURE — 99214 OFFICE O/P EST MOD 30 MIN: CPT | Performed by: FAMILY MEDICINE

## 2024-03-26 ASSESSMENT — PATIENT HEALTH QUESTIONNAIRE - PHQ9
SUM OF ALL RESPONSES TO PHQ9 QUESTIONS 1 & 2: 2
SUM OF ALL RESPONSES TO PHQ QUESTIONS 1-9: 2
1. LITTLE INTEREST OR PLEASURE IN DOING THINGS: SEVERAL DAYS
SUM OF ALL RESPONSES TO PHQ QUESTIONS 1-9: 2
2. FEELING DOWN, DEPRESSED OR HOPELESS: SEVERAL DAYS

## 2024-03-26 NOTE — PROGRESS NOTES
3/26/2024    Екатерина Mckee (:  1985) is a 38 y.o. female, here present with history of breast cancer currently taking chemotherapy immunotherapy and she currently taking Taxotere and carboplatin team and she recently got a shot of Neulasta, with multiple recent emergency room visit due to B symptoms fever chills body aches, with recent significant skin reaction that could be secondary to the current chemotherapy, unfortunately discontinuing chemotherapy medication would not benefit the patient at this time patient acknowledged and agreed the side effect of current medication has been seen and evaluated by oncologist on a regular basis, then going through surgical resection MRI on Friday,  at this time patient going to significant amount of anxiety state regarding current medication and current diagnosis unable to sleep unable to concentrate feeling depressed sad anxious unable to perform due to chronic fatigue nauseated vomiting lack of interest to do things denies suicidal homicidal ideation his surgery cannot be performed in May currently trying her best to stay positive she has disability paperwork filled syringes each pain which need to be answered to the best knowledge,     Patient Active Problem List   Diagnosis    Exposure to sexually transmitted disease (STD)    Insomnia    Bipolar 2 disorder (HCC)    Obesity (BMI 30.0-34.9)    ADHD (attention deficit hyperactivity disorder), combined type    Fatigue    Disability examination    Lightheadedness    Acute appendicitis    Chronic fatigue    Chlamydia infection    Depression with anxiety    High risk sexual behavior    Contact dermatitis    Bilateral posterior neck pain    Encounter for completion of form with patient    Prediabetes    Intractable migraine with aura with status migrainosus    Pregnancy    Nauseated    Depression, acute    Concentric fading    Poor concentration    Disorder of glucose regulation    Anxiety    Maternal chronic

## 2024-03-26 NOTE — PROGRESS NOTES
Chief Complaint   Patient presents with    documentation     Requesting work eval completed- form received       \"Have you been to the ER, urgent care clinic since your last visit?  Hospitalized since your last visit?\"    NO    “Have you seen or consulted any other health care providers outside of [facility] since your last visit?”    NO            There were no vitals filed for this visit.   Health Maintenance Due   Topic Date Due    Varicella vaccine (1 of 2 - 2-dose childhood series) Never done    COVID-19 Vaccine (1) Never done    A1C test (Diabetic or Prediabetic)  Never done    Hepatitis C screen  Never done    Shingles vaccine (1 of 2) Never done    Hepatitis B vaccine (2 of 3 - 19+ 3-dose series) 10/16/2008    Pneumococcal 0-64 years Vaccine (2 of 2 - PCV) 2017    Flu vaccine (1) 2023      The patient, Екатерина Mckee, identity was verified by name and .

## 2024-04-18 ENCOUNTER — HOSPITAL ENCOUNTER (OUTPATIENT)
Age: 39
Discharge: HOME | End: 2024-04-18
Attending: SURGERY
Payer: COMMERCIAL

## 2024-04-18 DIAGNOSIS — Z51.11 ENCOUNTER FOR ANTINEOPLASTIC CHEMOTHERAPY: ICD-10-CM

## 2024-04-18 DIAGNOSIS — C50.211 MALIGNANT NEOPLASM OF UPPER-INNER QUADRANT OF RIGHT FEMALE BREAST, UNSPECIFIED ESTROGEN RECEPTOR STATUS (HCC): ICD-10-CM

## 2024-04-18 DIAGNOSIS — C50.311 MALIGNANT NEOPLASM OF LOWER-INNER QUADRANT OF RIGHT FEMALE BREAST, UNSPECIFIED ESTROGEN RECEPTOR STATUS (HCC): ICD-10-CM

## 2024-04-18 PROCEDURE — A9585 GADOBUTROL INJECTION: HCPCS | Performed by: RADIOLOGY

## 2024-04-18 PROCEDURE — 6360000004 HC RX CONTRAST MEDICATION: Performed by: RADIOLOGY

## 2024-04-18 PROCEDURE — C8908 MRI W/O FOL W/CONT, BREAST,: HCPCS

## 2024-04-18 RX ORDER — GADOBUTROL 604.72 MG/ML
7.5 INJECTION INTRAVENOUS
Status: COMPLETED | OUTPATIENT
Start: 2024-04-18 | End: 2024-04-18

## 2024-04-18 RX ADMIN — GADOBUTROL 7.5 ML: 604.72 INJECTION INTRAVENOUS at 12:27

## 2024-08-13 ENCOUNTER — TELEPHONE (OUTPATIENT)
Age: 39
End: 2024-08-13

## 2024-08-13 NOTE — TELEPHONE ENCOUNTER
----- Message from Yunier STEEL sent at 8/13/2024 11:35 AM EDT -----  Regarding: E[facility] Referral Request  E[facility] Referral Request    Reason for referral request: Specialty Provider    Specialist/Lab/Test patient is requesting (if known): Dermatologist    Specialist Phone Number (if applicable):    Additional Information: Patient is requesting a referral for a dermatologist because she's having rashes on her right arm.  --------------------------------------------------------------------------------------------------------------------------    Relationship to Patient: Self     Call Back Information: OK to leave message on voicemail  Preferred Call Back Number: Phone [phone]

## 2024-08-13 NOTE — TELEPHONE ENCOUNTER
----- Message from Yunier STEEL sent at 8/13/2024 11:46 AM EDT -----  Regarding: ECC Escalation To Practice  ECC Escalation To Practice      Type of Escalation: Red Flag Symptom  --------------------------------------------------------------------------------------------------------------------------    Information for Provider:  Patient is looking for appointment for: Symptom/ numbness in fingers and feet/rashes in right arm  Reasons for Message: Unable to reach practice     Additional Information : Patient experiencing numbness both of her fingers and feet. And also she's having rashes on her right arm. SE, tried to transfer the but nobody answered from the practice. Until patient requested to send a message instead would like to a practice to call her back and then she disconnected the call.  --------------------------------------------------------------------------------------------------------------------------    Relationship to Patient: Self     Call Back Info: OK to leave message on voicemail  Preferred Call Back Number: Phone [phone]

## 2024-08-15 ENCOUNTER — TELEPHONE (OUTPATIENT)
Age: 39
End: 2024-08-15

## 2024-08-15 NOTE — TELEPHONE ENCOUNTER
----- Message from Chloé DAS sent at 8/14/2024  2:00 PM EDT -----  Regarding: ECC Escalation To Practice  ECC Escalation To Practice      Type of Escalation: Red Flag Symptom  --------------------------------------------------------------------------------------------------------------------------    Information for Provider:  Patient is looking for appointment for: Symptom Numbness  Reasons for Message: Unable to reach practice     Additional Information Patient want to book a Virtual appt. But patient need urgent care, mention red flag word Numbness stated she experiencing it for almost 3 months. Patient need to receive a call back.  --------------------------------------------------------------------------------------------------------------------------    Relationship to Patient: Self     Call Back Info: OK to leave message on [employer]  Preferred Call Back Number: Phone [phone]

## 2024-09-04 ENCOUNTER — TELEMEDICINE (OUTPATIENT)
Age: 39
End: 2024-09-04
Payer: COMMERCIAL

## 2024-09-04 DIAGNOSIS — T45.1X5A ADVERSE EFFECT OF CHEMOTHERAPY, INITIAL ENCOUNTER: Primary | ICD-10-CM

## 2024-09-04 DIAGNOSIS — G62.9 NEUROPATHY: ICD-10-CM

## 2024-09-04 PROCEDURE — 99213 OFFICE O/P EST LOW 20 MIN: CPT | Performed by: FAMILY MEDICINE

## 2024-09-04 RX ORDER — METHYLPREDNISOLONE 4 MG
TABLET, DOSE PACK ORAL
Qty: 21 TABLET | Refills: 0 | Status: SHIPPED | OUTPATIENT
Start: 2024-09-04

## 2024-09-04 RX ORDER — AMITRIPTYLINE HYDROCHLORIDE 10 MG/1
10 TABLET ORAL NIGHTLY
Qty: 90 TABLET | Refills: 0 | Status: SHIPPED | OUTPATIENT
Start: 2024-09-04

## 2024-09-04 NOTE — PROGRESS NOTES
Екатерина Mckee, was evaluated through a synchronous (real-time) audio-video encounter. The patient (or guardian if applicable) is aware that this is a billable service, which includes applicable co-pays. This Virtual Visit was conducted with patient's (and/or legal guardian's) consent. Patient identification was verified, and a caregiver was present when appropriate.   The patient was located at Home: [address] [place] [phone]  Provider was located at Facility (Appt Dept): [address] [place] [phone]  Confirm you are appropriately licensed, registered, or certified to deliver care in the state where the patient is located as indicated above. If you are not or unsure, please re-schedule the visit: Yes, I confirm.     Екатерина Mckee (:  1985) is a Established patient, presenting virtually for evaluation of the following: Patient presented with history of breast cancer stating that she finished all of the chemotherapy recently she has been experiencing a rash on the forearm in addition to tingling and numbing sensation on bilateral lower extremity has taken over the counter medication and not helpful      Constitutional: no fever,  nad     HENT: no ear pain or nosebleeds. No blurred vision  Respiratory: no shortness of breath, wheezing cough   Cardiovascular: Has no chest pain, ,and racing heart .   Gastrointestinal: No constipation, diarrhea, nausea and vomiting.   Genitourinary: No frequency.   Musculoskeletal: Negative for joint pain.   Skin: no itching, no rash.   Neurological: Negative for dizziness, no tremors  Psychiatric/Behavioral: no for depression  no nervous/anxious, nl stress levels, and overall emotional well-being .        Below is the assessment and plan developed based on review of pertinent history, physical exam, labs, studies, and medications.     Assessment & Plan  Adverse effect of chemotherapy, initial encounter         Orders:    
Chief Complaint   Patient presents with    Medication Check     Follow up        \"Have you been to the ER, urgent care clinic since your last visit?  Hospitalized since your last visit?\"    NO    “Have you seen or consulted any other health care providers outside of [facility] since your last visit?”    NO        The patient, Екатерина Mckee, identity was verified by name and .   
Normal rate, regular rhythm, normal S1, S2 heart sounds heard.

## 2025-01-28 ENCOUNTER — APPOINTMENT (OUTPATIENT)
Facility: HOSPITAL | Age: 40
End: 2025-01-28
Payer: COMMERCIAL

## 2025-01-28 ENCOUNTER — HOSPITAL ENCOUNTER (EMERGENCY)
Facility: HOSPITAL | Age: 40
Discharge: HOME | End: 2025-01-28
Payer: COMMERCIAL

## 2025-01-28 VITALS
RESPIRATION RATE: 16 BRPM | DIASTOLIC BLOOD PRESSURE: 91 MMHG | TEMPERATURE: 99 F | SYSTOLIC BLOOD PRESSURE: 137 MMHG | HEART RATE: 84 BPM | OXYGEN SATURATION: 100 %

## 2025-01-28 DIAGNOSIS — M54.9 UPPER BACK PAIN: ICD-10-CM

## 2025-01-28 DIAGNOSIS — J06.9 ACUTE UPPER RESPIRATORY INFECTION: Primary | ICD-10-CM

## 2025-01-28 LAB
FLUAV RNA SPEC QL NAA+PROBE: NOT DETECTED
FLUBV RNA SPEC QL NAA+PROBE: NOT DETECTED
S PYO DNA THROAT QL NAA+PROBE: NOT DETECTED
SARS-COV-2 RNA RESP QL NAA+PROBE: NOT DETECTED
SOURCE: NORMAL

## 2025-01-28 PROCEDURE — 87636 SARSCOV2 & INF A&B AMP PRB: CPT

## 2025-01-28 PROCEDURE — 99284 EMERGENCY DEPT VISIT MOD MDM: CPT

## 2025-01-28 PROCEDURE — 87651 STREP A DNA AMP PROBE: CPT

## 2025-01-28 PROCEDURE — 71046 X-RAY EXAM CHEST 2 VIEWS: CPT

## 2025-01-28 RX ORDER — METHOCARBAMOL 750 MG/1
750 TABLET, FILM COATED ORAL 4 TIMES DAILY
Qty: 40 TABLET | Refills: 0 | Status: SHIPPED | OUTPATIENT
Start: 2025-01-28 | End: 2025-02-07

## 2025-01-28 ASSESSMENT — PAIN DESCRIPTION - PAIN TYPE: TYPE: ACUTE PAIN

## 2025-01-28 ASSESSMENT — PAIN - FUNCTIONAL ASSESSMENT: PAIN_FUNCTIONAL_ASSESSMENT: 0-10

## 2025-01-28 ASSESSMENT — PAIN DESCRIPTION - FREQUENCY: FREQUENCY: INTERMITTENT

## 2025-01-28 ASSESSMENT — PAIN SCALES - GENERAL: PAINLEVEL_OUTOF10: 5

## 2025-01-28 ASSESSMENT — PAIN DESCRIPTION - ORIENTATION: ORIENTATION: POSTERIOR

## 2025-01-28 ASSESSMENT — PAIN DESCRIPTION - DESCRIPTORS: DESCRIPTORS: ACHING

## 2025-01-28 NOTE — ED PROVIDER NOTES
13    Laparoscopic Appendectomy     SECTION      ,     DILATION AND CURETTAGE OF UTERUS      MASTECTOMY, PARTIAL Right 2024       Family History:  Family History   Problem Relation Age of Onset    Neuropathy Mother     Stroke Mother     Breast Cancer Mother 53       Social History:  Social History     Tobacco Use    Smoking status: Never    Smokeless tobacco: Never   Vaping Use    Vaping status: Never Used   Substance Use Topics    Alcohol use: Not Currently    Drug use: No       Allergies:  No Known Allergies    CURRENT MEDICATIONS      Previous Medications    ALBUTEROL SULFATE HFA (PROVENTIL;VENTOLIN;PROAIR) 108 (90 BASE) MCG/ACT INHALER    Inhale 1 puff into the lungs every 4 hours as needed    AMITRIPTYLINE (ELAVIL) 10 MG TABLET    Take 1 tablet by mouth nightly    BUSPIRONE (BUSPAR) 5 MG TABLET    Take by mouth 2 times daily as needed    CARIPRAZINE HCL (VRAYLAR) 1.5 MG CAPSULE    Take 1 capsule by mouth daily    CYCLOBENZAPRINE (FLEXERIL) 10 MG TABLET    Take by mouth 3 times daily as needed    DICLOFENAC (VOLTAREN) 75 MG EC TABLET    Take by mouth 2 times daily    FERROUS SULFATE (DOMINIQUE-IN-SOL) 75 (15 FE) MG/ML SOLUTION    Take 1 mL by mouth 3 times daily    IBUPROFEN (ADVIL;MOTRIN) 800 MG TABLET    Take by mouth every 8 hours as needed    LIDOCAINE-PRILOCAINE (EMLA) 2.5-2.5 % CREAM    Apply generous amount topically to port site 30 min prior to infusions and cover with plastic wra    METHYLPREDNISOLONE (MEDROL DOSEPACK) 4 MG TABLET    Take by mouth.    ONDANSETRON (ZOFRAN-ODT) 4 MG DISINTEGRATING TABLET    Take 1 tablet by mouth 3 times daily as needed for Nausea or Vomiting    PROCHLORPERAZINE (COMPAZINE) 10 MG TABLET    Take 1 tablet by mouth every 6 hours as needed (nausea and or vomiting)       PHYSICAL EXAM      Vitals:    25 1407   BP: (!) 137/91   Pulse: 84   Resp: 16   Temp: 99 °F (37.2 °C)   TempSrc: Oral   SpO2: 100%       Physical Exam  Vitals and nursing note

## 2025-03-20 ENCOUNTER — HOSPITAL ENCOUNTER (EMERGENCY)
Facility: HOSPITAL | Age: 40
Discharge: HOME | End: 2025-03-20
Payer: COMMERCIAL

## 2025-03-20 ENCOUNTER — APPOINTMENT (OUTPATIENT)
Facility: HOSPITAL | Age: 40
End: 2025-03-20
Payer: COMMERCIAL

## 2025-03-20 VITALS
HEIGHT: 68 IN | SYSTOLIC BLOOD PRESSURE: 129 MMHG | RESPIRATION RATE: 16 BRPM | BODY MASS INDEX: 28.19 KG/M2 | DIASTOLIC BLOOD PRESSURE: 101 MMHG | TEMPERATURE: 98.6 F | OXYGEN SATURATION: 100 % | HEART RATE: 74 BPM | WEIGHT: 186 LBS

## 2025-03-20 DIAGNOSIS — R07.89 ATYPICAL CHEST PAIN: Primary | ICD-10-CM

## 2025-03-20 DIAGNOSIS — F41.1 ANXIETY STATE: ICD-10-CM

## 2025-03-20 LAB
ALBUMIN SERPL-MCNC: 3.6 G/DL (ref 3.5–5)
ALBUMIN/GLOB SERPL: 0.8 (ref 1.1–2.2)
ALP SERPL-CCNC: 69 U/L (ref 45–117)
ALT SERPL-CCNC: 25 U/L (ref 12–78)
ANION GAP SERPL CALC-SCNC: 2 MMOL/L (ref 2–12)
AST SERPL-CCNC: 29 U/L (ref 15–37)
BASOPHILS # BLD: 0 K/UL (ref 0–0.1)
BASOPHILS NFR BLD: 0 % (ref 0–1)
BILIRUB SERPL-MCNC: 0.6 MG/DL (ref 0.2–1)
BUN SERPL-MCNC: 10 MG/DL (ref 6–20)
BUN/CREAT SERPL: 10 (ref 12–20)
CALCIUM SERPL-MCNC: 9.3 MG/DL (ref 8.5–10.1)
CHLORIDE SERPL-SCNC: 106 MMOL/L (ref 97–108)
CO2 SERPL-SCNC: 30 MMOL/L (ref 21–32)
CREAT SERPL-MCNC: 1 MG/DL (ref 0.55–1.02)
DIFFERENTIAL METHOD BLD: NORMAL
EOSINOPHIL # BLD: 0.08 K/UL (ref 0–0.4)
EOSINOPHIL NFR BLD: 2 % (ref 0–7)
ERYTHROCYTE [DISTWIDTH] IN BLOOD BY AUTOMATED COUNT: 13.2 % (ref 11.5–14.5)
GLOBULIN SER CALC-MCNC: 4.3 G/DL (ref 2–4)
GLUCOSE SERPL-MCNC: 84 MG/DL (ref 65–100)
HCT VFR BLD AUTO: 37.2 % (ref 35–47)
HGB BLD-MCNC: 12.6 G/DL (ref 11.5–16)
IMM GRANULOCYTES # BLD AUTO: 0 K/UL (ref 0–0.04)
IMM GRANULOCYTES NFR BLD AUTO: 0 % (ref 0–0.5)
LYMPHOCYTES # BLD: 1.09 K/UL (ref 0.8–3.5)
LYMPHOCYTES NFR BLD: 28 % (ref 12–49)
MCH RBC QN AUTO: 29.6 PG (ref 26–34)
MCHC RBC AUTO-ENTMCNC: 33.9 G/DL (ref 30–36.5)
MCV RBC AUTO: 87.3 FL (ref 80–99)
MONOCYTES # BLD: 0.35 K/UL (ref 0–1)
MONOCYTES NFR BLD: 9 % (ref 5–13)
NEUTS SEG # BLD: 2.38 K/UL (ref 1.8–8)
NEUTS SEG NFR BLD: 61 % (ref 32–75)
NRBC # BLD: 0 K/UL (ref 0–0.01)
NRBC BLD-RTO: 0 PER 100 WBC
PLATELET # BLD AUTO: 272 K/UL (ref 150–400)
PMV BLD AUTO: 10.7 FL (ref 8.9–12.9)
POTASSIUM SERPL-SCNC: 4.1 MMOL/L (ref 3.5–5.1)
PROT SERPL-MCNC: 7.9 G/DL (ref 6.4–8.2)
RBC # BLD AUTO: 4.26 M/UL (ref 3.8–5.2)
RBC MORPH BLD: NORMAL
SODIUM SERPL-SCNC: 138 MMOL/L (ref 136–145)
TROPONIN I SERPL HS-MCNC: 9 NG/L (ref 0–51)
WBC # BLD AUTO: 3.9 K/UL (ref 3.6–11)

## 2025-03-20 PROCEDURE — 84484 ASSAY OF TROPONIN QUANT: CPT

## 2025-03-20 PROCEDURE — 80053 COMPREHEN METABOLIC PANEL: CPT

## 2025-03-20 PROCEDURE — 93005 ELECTROCARDIOGRAM TRACING: CPT | Performed by: EMERGENCY MEDICINE

## 2025-03-20 PROCEDURE — 85025 COMPLETE CBC W/AUTO DIFF WBC: CPT

## 2025-03-20 PROCEDURE — 6360000002 HC RX W HCPCS

## 2025-03-20 PROCEDURE — 96374 THER/PROPH/DIAG INJ IV PUSH: CPT

## 2025-03-20 PROCEDURE — 99285 EMERGENCY DEPT VISIT HI MDM: CPT

## 2025-03-20 PROCEDURE — 36415 COLL VENOUS BLD VENIPUNCTURE: CPT

## 2025-03-20 PROCEDURE — 71046 X-RAY EXAM CHEST 2 VIEWS: CPT

## 2025-03-20 RX ORDER — DIAZEPAM 10 MG/2ML
5 INJECTION, SOLUTION INTRAMUSCULAR; INTRAVENOUS ONCE
Status: COMPLETED | OUTPATIENT
Start: 2025-03-20 | End: 2025-03-20

## 2025-03-20 RX ORDER — DIAZEPAM 5 MG/1
5 TABLET ORAL EVERY 6 HOURS PRN
Qty: 8 TABLET | Refills: 0 | Status: SHIPPED | OUTPATIENT
Start: 2025-03-20 | End: 2025-03-20

## 2025-03-20 RX ORDER — DIAZEPAM 5 MG/1
5 TABLET ORAL EVERY 6 HOURS PRN
Qty: 8 TABLET | Refills: 0 | Status: SHIPPED | OUTPATIENT
Start: 2025-03-20 | End: 2025-03-22

## 2025-03-20 RX ADMIN — DIAZEPAM 5 MG: 5 INJECTION, SOLUTION INTRAMUSCULAR; INTRAVENOUS at 17:36

## 2025-03-20 ASSESSMENT — PAIN DESCRIPTION - LOCATION: LOCATION: ARM

## 2025-03-20 ASSESSMENT — PAIN DESCRIPTION - PAIN TYPE: TYPE: ACUTE PAIN

## 2025-03-20 ASSESSMENT — PAIN DESCRIPTION - ORIENTATION: ORIENTATION: RIGHT

## 2025-03-20 ASSESSMENT — PAIN DESCRIPTION - DESCRIPTORS: DESCRIPTORS: ACHING

## 2025-03-20 ASSESSMENT — LIFESTYLE VARIABLES
HOW MANY STANDARD DRINKS CONTAINING ALCOHOL DO YOU HAVE ON A TYPICAL DAY: 1 OR 2
HOW OFTEN DO YOU HAVE A DRINK CONTAINING ALCOHOL: MONTHLY OR LESS

## 2025-03-20 ASSESSMENT — PAIN SCALES - GENERAL: PAINLEVEL_OUTOF10: 5

## 2025-03-20 ASSESSMENT — PAIN - FUNCTIONAL ASSESSMENT: PAIN_FUNCTIONAL_ASSESSMENT: ACTIVITIES ARE NOT PREVENTED

## 2025-03-21 LAB
EKG ATRIAL RATE: 79 BPM
EKG DIAGNOSIS: NORMAL
EKG P AXIS: 78 DEGREES
EKG P-R INTERVAL: 136 MS
EKG Q-T INTERVAL: 366 MS
EKG QRS DURATION: 88 MS
EKG QTC CALCULATION (BAZETT): 419 MS
EKG R AXIS: 68 DEGREES
EKG T AXIS: 36 DEGREES
EKG VENTRICULAR RATE: 79 BPM

## 2025-03-21 NOTE — ED PROVIDER NOTES
patient understands and consents to the risk of disposition/plan, as well as the risk of uncertainty in estimating outcomes. [id number]            FINAL IMPRESSION     1. Atypical chest pain    2. Anxiety state          DISPOSITION/PLAN   DISPOSITION Decision To Discharge 03/20/2025 07:00:34 PM   DISPOSITION CONDITION Stable         Discharge Note: The patient is stable for discharge home. The signs, symptoms, diagnosis, and discharge instructions have been discussed, understanding conveyed, and agreed upon. The patient is to follow up as recommended or return to ER should their symptoms worsen.      PATIENT REFERRED TO:  Wang Glynn MD  [address] [id number] [phone]    Schedule an appointment as soon as possible for a visit       [facility]  [address]  [address]  [facility] [address]  Schedule an appointment as soon as possible for a visit       [facility]  [address]  [facility] [id number] [phone]  Schedule an appointment as soon as possible for a visit       [facility]  [address]  [facility] [address] [phone]    As needed, If symptoms worsen       DISCHARGE MEDICATIONS:     Medication List        START taking these medications      diazePAM 5 MG tablet  Commonly known as: Valium  Take 1 tablet by mouth every 6 hours as needed for Anxiety for up to 2 days. Max Daily Amount: 20 mg            ASK your doctor about these medications      albuterol sulfate  (90 Base) MCG/ACT inhaler  Commonly known as: PROVENTIL;VENTOLIN;PROAIR     amitriptyline 10 MG tablet  Commonly known as: ELAVIL  Take 1 tablet by mouth nightly     busPIRone 5 MG tablet  Commonly known as: BUSPAR     cariprazine hcl 1.5 MG capsule  Commonly known as: Vraylar  Take 1 capsule by mouth daily     cyclobenzaprine 10 MG tablet  Commonly known as: FLEXERIL     diclofenac 75  MG EC tablet  Commonly known as: VOLTAREN     ferrous sulfate 75 (15 Fe) MG/ML solution  Commonly known as: Cornelius-In-Sol  Take 1 mL by mouth 3 times daily     ibuprofen 800 MG tablet  Commonly known as: ADVIL;MOTRIN     lidocaine-prilocaine 2.5-2.5 % cream  Commonly known as: EMLA  Apply generous amount topically to port site 30 min prior to infusions and cover with plastic wra     methylPREDNISolone 4 MG tablet  Commonly known as: MEDROL DOSEPACK  Take by mouth.     ondansetron 4 MG disintegrating tablet  Commonly known as: ZOFRAN-ODT  Take 1 tablet by mouth 3 times daily as needed for Nausea or Vomiting     prochlorperazine 10 MG tablet  Commonly known as: COMPAZINE  Take 1 tablet by mouth every 6 hours as needed (nausea and or vomiting)               Where to Get Your Medications        These medications were sent to [facility]/pharmacy #[id number] [address] -  [phone] - F [phone]  [address] [facility] [address]      Phone: [phone]   diazePAM 5 MG tablet           DISCONTINUED MEDICATIONS:  Discharge Medication List as of 3/20/2025  7:02 PM        I have seen and evaluated the patient autonomously. My supervision physician was on site and available for consultation if needed.     I am the Primary Clinician of Record.   Izabel Chaparro PA-C (electronically signed)    (Please note that parts of this dictation were completed with voice recognition software. Quite often unanticipated grammatical, syntax, homophones, and other interpretive errors are inadvertently transcribed by the computer software. Please disregards these errors. Please excuse any errors that have escaped final proofreading.)       Izabel Chaparro PA-C  03/23/25 0944

## 2025-05-02 ENCOUNTER — APPOINTMENT (OUTPATIENT)
Facility: HOSPITAL | Age: 40
End: 2025-05-02
Payer: COMMERCIAL

## 2025-05-02 ENCOUNTER — HOSPITAL ENCOUNTER (EMERGENCY)
Facility: HOSPITAL | Age: 40
Discharge: HOME | End: 2025-05-02
Payer: COMMERCIAL

## 2025-05-02 VITALS
DIASTOLIC BLOOD PRESSURE: 91 MMHG | SYSTOLIC BLOOD PRESSURE: 145 MMHG | TEMPERATURE: 98.8 F | OXYGEN SATURATION: 100 % | RESPIRATION RATE: 16 BRPM | BODY MASS INDEX: 28.28 KG/M2 | HEART RATE: 68 BPM | WEIGHT: 186 LBS

## 2025-05-02 DIAGNOSIS — R03.0 ELEVATED BLOOD PRESSURE READING: ICD-10-CM

## 2025-05-02 DIAGNOSIS — R19.7 DIARRHEA, UNSPECIFIED TYPE: ICD-10-CM

## 2025-05-02 DIAGNOSIS — M54.50 ACUTE BILATERAL LOW BACK PAIN WITHOUT SCIATICA: Primary | ICD-10-CM

## 2025-05-02 LAB
ALBUMIN SERPL-MCNC: 3.6 G/DL (ref 3.5–5)
ALBUMIN/GLOB SERPL: 0.9 (ref 1.1–2.2)
ALP SERPL-CCNC: 68 U/L (ref 45–117)
ALT SERPL-CCNC: 27 U/L (ref 12–78)
ANION GAP SERPL CALC-SCNC: 7 MMOL/L (ref 2–12)
APPEARANCE UR: CLEAR
AST SERPL-CCNC: 26 U/L (ref 15–37)
BACTERIA URNS QL MICRO: NEGATIVE /HPF
BASOPHILS # BLD: 0.05 K/UL (ref 0–0.1)
BASOPHILS NFR BLD: 1 % (ref 0–1)
BILIRUB SERPL-MCNC: 0.5 MG/DL (ref 0.2–1)
BILIRUB UR QL: NEGATIVE
BUN SERPL-MCNC: 14 MG/DL (ref 6–20)
BUN/CREAT SERPL: 14 (ref 12–20)
CALCIUM SERPL-MCNC: 9.3 MG/DL (ref 8.5–10.1)
CHLORIDE SERPL-SCNC: 106 MMOL/L (ref 97–108)
CO2 SERPL-SCNC: 25 MMOL/L (ref 21–32)
COLOR UR: ABNORMAL
CREAT SERPL-MCNC: 1.02 MG/DL (ref 0.55–1.02)
DIFFERENTIAL METHOD BLD: ABNORMAL
EOSINOPHIL # BLD: 0.14 K/UL (ref 0–0.4)
EOSINOPHIL NFR BLD: 3 % (ref 0–7)
EPITH CASTS URNS QL MICRO: ABNORMAL /LPF
ERYTHROCYTE [DISTWIDTH] IN BLOOD BY AUTOMATED COUNT: 12.6 % (ref 11.5–14.5)
GLOBULIN SER CALC-MCNC: 4.1 G/DL (ref 2–4)
GLUCOSE SERPL-MCNC: 106 MG/DL (ref 65–100)
GLUCOSE UR STRIP.AUTO-MCNC: NEGATIVE MG/DL
HCG UR QL: NEGATIVE
HCT VFR BLD AUTO: 35.9 % (ref 35–47)
HGB BLD-MCNC: 12.4 G/DL (ref 11.5–16)
HGB UR QL STRIP: NEGATIVE
HYALINE CASTS URNS QL MICRO: ABNORMAL /LPF (ref 0–2)
IMM GRANULOCYTES # BLD AUTO: 0 K/UL (ref 0–0.04)
IMM GRANULOCYTES NFR BLD AUTO: 0 % (ref 0–0.5)
KETONES UR QL STRIP.AUTO: ABNORMAL MG/DL
LEUKOCYTE ESTERASE UR QL STRIP.AUTO: NEGATIVE
LIPASE SERPL-CCNC: 41 U/L (ref 13–75)
LYMPHOCYTES # BLD: 1.25 K/UL (ref 0.8–3.5)
LYMPHOCYTES NFR BLD: 26 % (ref 12–49)
MAGNESIUM SERPL-MCNC: 1.8 MG/DL (ref 1.6–2.4)
MCH RBC QN AUTO: 29.5 PG (ref 26–34)
MCHC RBC AUTO-ENTMCNC: 34.5 G/DL (ref 30–36.5)
MCV RBC AUTO: 85.3 FL (ref 80–99)
MONOCYTES # BLD: 0.14 K/UL (ref 0–1)
MONOCYTES NFR BLD: 3 % (ref 5–13)
NEUTS SEG # BLD: 3.22 K/UL (ref 1.8–8)
NEUTS SEG NFR BLD: 67 % (ref 32–75)
NITRITE UR QL STRIP.AUTO: NEGATIVE
NRBC # BLD: 0 K/UL (ref 0–0.01)
NRBC BLD-RTO: 0 PER 100 WBC
PH UR STRIP: 6 (ref 5–8)
PLATELET # BLD AUTO: 258 K/UL (ref 150–400)
PMV BLD AUTO: 10.6 FL (ref 8.9–12.9)
POTASSIUM SERPL-SCNC: 4.3 MMOL/L (ref 3.5–5.1)
PROT SERPL-MCNC: 7.7 G/DL (ref 6.4–8.2)
PROT UR STRIP-MCNC: NEGATIVE MG/DL
RBC # BLD AUTO: 4.21 M/UL (ref 3.8–5.2)
RBC #/AREA URNS HPF: ABNORMAL /HPF (ref 0–5)
RBC MORPH BLD: ABNORMAL
SODIUM SERPL-SCNC: 138 MMOL/L (ref 136–145)
SP GR UR REFRACTOMETRY: 1.02
URINE CULTURE IF INDICATED: ABNORMAL
UROBILINOGEN UR QL STRIP.AUTO: 1 EU/DL (ref 0.2–1)
WBC # BLD AUTO: 4.8 K/UL (ref 3.6–11)
WBC URNS QL MICRO: ABNORMAL /HPF (ref 0–4)

## 2025-05-02 PROCEDURE — 83690 ASSAY OF LIPASE: CPT

## 2025-05-02 PROCEDURE — 81001 URINALYSIS AUTO W/SCOPE: CPT

## 2025-05-02 PROCEDURE — 72100 X-RAY EXAM L-S SPINE 2/3 VWS: CPT

## 2025-05-02 PROCEDURE — 36415 COLL VENOUS BLD VENIPUNCTURE: CPT

## 2025-05-02 PROCEDURE — 81025 URINE PREGNANCY TEST: CPT

## 2025-05-02 PROCEDURE — 99284 EMERGENCY DEPT VISIT MOD MDM: CPT

## 2025-05-02 PROCEDURE — 6370000000 HC RX 637 (ALT 250 FOR IP): Performed by: PHYSICIAN ASSISTANT

## 2025-05-02 PROCEDURE — 85025 COMPLETE CBC W/AUTO DIFF WBC: CPT

## 2025-05-02 PROCEDURE — 83735 ASSAY OF MAGNESIUM: CPT

## 2025-05-02 PROCEDURE — 80053 COMPREHEN METABOLIC PANEL: CPT

## 2025-05-02 RX ORDER — IBUPROFEN 600 MG/1
600 TABLET, FILM COATED ORAL EVERY 8 HOURS PRN
Qty: 20 TABLET | Refills: 0 | Status: SHIPPED | OUTPATIENT
Start: 2025-05-02

## 2025-05-02 RX ORDER — LIDOCAINE 50 MG/G
1 PATCH TOPICAL DAILY
Qty: 10 PATCH | Refills: 0 | Status: SHIPPED | OUTPATIENT
Start: 2025-05-02 | End: 2025-05-12

## 2025-05-02 RX ORDER — OXYCODONE HYDROCHLORIDE 5 MG/1
10 TABLET ORAL
Refills: 0 | Status: COMPLETED | OUTPATIENT
Start: 2025-05-02 | End: 2025-05-02

## 2025-05-02 RX ORDER — ONDANSETRON 4 MG/1
4 TABLET, ORALLY DISINTEGRATING ORAL ONCE
Status: COMPLETED | OUTPATIENT
Start: 2025-05-02 | End: 2025-05-02

## 2025-05-02 RX ADMIN — ONDANSETRON 4 MG: 4 TABLET, ORALLY DISINTEGRATING ORAL at 13:30

## 2025-05-02 RX ADMIN — OXYCODONE 10 MG: 5 TABLET ORAL at 13:30

## 2025-05-02 ASSESSMENT — PAIN - FUNCTIONAL ASSESSMENT
PAIN_FUNCTIONAL_ASSESSMENT: 0-10
PAIN_FUNCTIONAL_ASSESSMENT: 0-10

## 2025-05-02 ASSESSMENT — PAIN SCALES - GENERAL
PAINLEVEL_OUTOF10: 10
PAINLEVEL_OUTOF10: 3
PAINLEVEL_OUTOF10: 10
PAINLEVEL_OUTOF10: 10

## 2025-05-02 ASSESSMENT — LIFESTYLE VARIABLES
HOW MANY STANDARD DRINKS CONTAINING ALCOHOL DO YOU HAVE ON A TYPICAL DAY: PATIENT DOES NOT DRINK
HOW OFTEN DO YOU HAVE A DRINK CONTAINING ALCOHOL: NEVER

## 2025-05-02 ASSESSMENT — PAIN DESCRIPTION - LOCATION
LOCATION: BACK

## 2025-05-02 ASSESSMENT — VISUAL ACUITY: OU: 1

## 2025-05-02 ASSESSMENT — PAIN DESCRIPTION - DESCRIPTORS: DESCRIPTORS: ACHING

## 2025-05-02 ASSESSMENT — PAIN DESCRIPTION - ORIENTATION: ORIENTATION: LOWER

## 2025-05-02 NOTE — ED PROVIDER NOTES
[facility] EMERGENCY DEPARTMENT  EMERGENCY DEPARTMENT ENCOUNTER       Pt Name: Екатерина Mckee  MRN: [id number]  Birthdate 1985  Date of evaluation: 5/2/2025  Provider: SALINAS Banda   PCP: Wang Glynn MD  Note Started: 12:10 PM EDT 5/2/25     CHIEF COMPLAINT       Chief Complaint   Patient presents with    Back Pain    Diarrhea     Patient ambulatory to triage c/o lower back pain x1 week and reports that she began to have diarrhea starting yesterday. Patient denies abdominal pain, denies vomiting, denies nausea. Denies injury/trauma to the back.         HISTORY OF PRESENT ILLNESS: 1 or more elements      History From: Patient  HPI Limitations: None     Екатерина Mckee is a 39 y.o. female who presents ambulatory with low back pain over the past week.  Symptoms are worse with movement.  Pain is were localized and does not radiate.  She denies any numbness or weakness.  She denies any bowel or bladder dysfunction and specifically denies any anal leakage or acute urinary retention.  She has no history of lumbar surgery.  She has been well lately without fever.  She tells me she did take some Excedrin when at work yesterday however symptoms persist.  She tells me she did have an episode of loose stool today but denies frequent diarrhea.  She denies any abdominal pain.  She denies any chest pain or shortness of breath.  She tells me she left work today due to her symptoms.  She tells me she wants an x-ray of her back.     Nursing Notes were all reviewed and agreed with or any disagreements were addressed in the HPI.     REVIEW OF SYSTEMS      Review of Systems     Positives and Pertinent negatives as per HPI.    PAST HISTORY     Past Medical History:  Past Medical History:   Diagnosis Date    Abnormal Papanicolaou smear of cervix     follow-up normal    ADHD (attention deficit hyperactivity disorder), combined type 06/02/2016    Bipolar 2 disorder (HCC) 10/25/2016    Breast cancer (HCC) 2023     migraine with aura with status migrainosus, Obesity (BMI 30.0-34.9), and Poor concentration.     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 female who presents ambulatory with low back pain over the past week.  Symptoms are worse with movement.  Pain is were localized and does not radiate.  She denies any numbness or weakness.  She denies any bowel or bladder dysfunction and specifically denies any anal leakage or acute urinary retention.  She has no history of lumbar surgery.  She has been well lately without fever.  She tells me she did take some Excedrin when at work yesterday however symptoms persist.  She tells me she did have an episode of loose stool today but denies frequent diarrhea.  She denies any abdominal pain.  She denies any chest pain or shortness of breath.  She tells me she left work today due to her symptoms.  She tells me she wants an x-ray of her back.    On exam, she is afebrile and well-appearing  Her belly is soft  She has diffuse low back pain    Given her symptoms, urinalysis is ordered to evaluate for UTI  IV access is ordered  CBC to evaluate for leukocytosis, anemia or platelets function  CMP to evaluate renal function, electrolytes and transaminase  Lipase to evaluate pancreatic function  Plain films of the lumbar spine evaluate for compression fracture or acute process  10 mg oral oxycodone is ordered given her stated severe pain    MEDICAL DECISION MAKING:   I considered, but did not perform, additional testing such as MRI Spine or Brain, as well as admission or

## 2025-05-02 NOTE — DISCHARGE INSTRUCTIONS
Thank You!    It was a pleasure taking care of you in our Emergency Department today. We know that when you come to our Emergency Department, you are entrusting us with your health, comfort, and safety. Our physicians and nurses honor that trust, and truly appreciate the opportunity to care for you and your loved ones.      We also value your feedback. If you receive a survey about your Emergency Department experience today, please fill it out.  We care about our patients' feedback, and we listen to what you have to say.  Thank you.    Nicolas Noble PA-C      ________________________________________________________________________  I have included a copy of your lab results and/or radiologic studies from today's visit so you can have them easily available at your follow-up visit. We hope you feel better and please do not hesitate to contact the ED if you have any questions at all!    Recent Results (from the past 12 hours)   CBC with Auto Differential    Collection Time: 05/02/25 11:36 AM   Result Value Ref Range    WBC 4.8 3.6 - 11.0 K/uL    RBC 4.21 3.80 - 5.20 M/uL    Hemoglobin 12.4 11.5 - 16.0 g/dL    Hematocrit 35.9 35.0 - 47.0 %    MCV 85.3 80.0 - 99.0 FL    MCH 29.5 26.0 - 34.0 PG    MCHC 34.5 30.0 - 36.5 g/dL    RDW 12.6 11.5 - 14.5 %    Platelets 258 150 - 400 K/uL    MPV 10.6 8.9 - 12.9 FL    Nucleated RBCs 0.0 0  WBC    nRBC 0.00 0.00 - 0.01 K/uL    Neutrophils % 67 32.0 - 75.0 %    Lymphocytes % 26 12.0 - 49.0 %    Monocytes % 3 (L) 5.0 - 13.0 %    Eosinophils % 3 0.0 - 7.0 %    Basophils % 1 0.0 - 1.0 %    Immature Granulocytes % 0 0.0 - 0.5 %    Neutrophils Absolute 3.22 1.80 - 8.00 K/UL    Lymphocytes Absolute 1.25 0.80 - 3.50 K/UL    Monocytes Absolute 0.14 0.00 - 1.00 K/UL    Eosinophils Absolute 0.14 0.00 - 0.40 K/UL    Basophils Absolute 0.05 0.00 - 0.10 K/UL    Immature Granulocytes Absolute 0.00 0.00 - 0.04 K/UL    Differential Type MANUAL      RBC Comment ANISOCYTOSIS  1+

## 2025-06-19 ENCOUNTER — OFFICE VISIT (OUTPATIENT)
Age: 40
End: 2025-06-19
Payer: COMMERCIAL

## 2025-06-19 VITALS
WEIGHT: 188 LBS | BODY MASS INDEX: 28.59 KG/M2 | TEMPERATURE: 97.6 F | DIASTOLIC BLOOD PRESSURE: 87 MMHG | HEART RATE: 98 BPM | SYSTOLIC BLOOD PRESSURE: 125 MMHG | OXYGEN SATURATION: 97 % | RESPIRATION RATE: 17 BRPM

## 2025-06-19 DIAGNOSIS — R03.0 BLOOD PRESSURE ELEVATED WITHOUT HISTORY OF HTN: Primary | ICD-10-CM

## 2025-06-19 DIAGNOSIS — M79.601 PAIN OF RIGHT UPPER EXTREMITY: ICD-10-CM

## 2025-06-19 PROCEDURE — 3079F DIAST BP 80-89 MM HG: CPT | Performed by: FAMILY MEDICINE

## 2025-06-19 PROCEDURE — 99213 OFFICE O/P EST LOW 20 MIN: CPT | Performed by: FAMILY MEDICINE

## 2025-06-19 PROCEDURE — 3074F SYST BP LT 130 MM HG: CPT | Performed by: FAMILY MEDICINE

## 2025-06-19 RX ORDER — TAMOXIFEN CITRATE 20 MG/1
TABLET ORAL
COMMUNITY
Start: 2024-08-21

## 2025-06-19 SDOH — ECONOMIC STABILITY: FOOD INSECURITY: WITHIN THE PAST 12 MONTHS, THE FOOD YOU BOUGHT JUST DIDN'T LAST AND YOU DIDN'T HAVE MONEY TO GET MORE.: NEVER TRUE

## 2025-06-19 SDOH — ECONOMIC STABILITY: FOOD INSECURITY: WITHIN THE PAST 12 MONTHS, YOU WORRIED THAT YOUR FOOD WOULD RUN OUT BEFORE YOU GOT MONEY TO BUY MORE.: NEVER TRUE

## 2025-06-19 ASSESSMENT — PATIENT HEALTH QUESTIONNAIRE - PHQ9
SUM OF ALL RESPONSES TO PHQ QUESTIONS 1-9: 0
2. FEELING DOWN, DEPRESSED OR HOPELESS: NOT AT ALL
1. LITTLE INTEREST OR PLEASURE IN DOING THINGS: NOT AT ALL
SUM OF ALL RESPONSES TO PHQ QUESTIONS 1-9: 0

## 2025-06-19 NOTE — PROGRESS NOTES
Екатерина Mckee (:  1985) is a 39 y.o. female,Established patient, here for evaluation of the following chief complaint(s):  Blood Pressure Check    HTN   pt also present for Bp check , on no bp meds, compliant w/ the , a low salt diet, an  active life style, patient does not obtain the bp at home , today the pt denies Chest Pain, has no legs swelling no lightheadedness,  the pat has not been feeling anxious, and  Has not been feeling stressed out, otherwise feeling better since the last visit    ROS:    Constitutional: no fever, nl energy levels, nl sleep patterns, nl appetite, and nl weight fluctuations,  - exercise habits,  nad     HENT: no ear pain or nosebleeds. No blurred vision  Respiratory: no shortness of breath, wheezing cough   Cardiovascular: Has no chest pain, ,and racing heart .   Gastrointestinal: No constipation, diarrhea, nausea and vomiting.   Genitourinary: No frequency.   Musculoskeletal: Negative for joint pain.   Skin: no itching, no rash.   Neurological: Negative for dizziness, no tremors  Psychiatric/Behavioral: no for depression  no nervous/anxious, nl stress levels, and overall emotional well-being .      PE:    Constitutional: Well developed, well nourished.  non-toxic in appearance, not diaphoretic.   HEENT: PERRL. EOMI. The left TM is unremarkable. The right TM is unremarkable. No nasal  erythema noted.  THROAT: Posterior pharynx has no erythema, no exudates.    Neck:  no cervical lymphadenopathy. Neck is supple   Cardiovascular: Regular rate and rhythm, no murmurs, rubs, or gallops.   Pulmonary: Clear to auscultation bilaterally. Has no wheezing, rales or rhonchi.,  speaking in full sentences, has no accessory muscle used.  Abdomen: Bowel sounds are normal. Having no distension, no palpable mass. Soft,  No tenderness, rebound or guarding.   Musculoskeletal: No peripheral edema, having normal ROM  Skin:   warm and dry. No diaphoresis, rashes, or lesions.   Neurological:

## 2025-06-19 NOTE — PROGRESS NOTES
Chief Complaint   Patient presents with    Blood Pressure Check       \"Have you been to the ER, urgent care clinic since your last visit?  Hospitalized since your last visit?\"    NO    “Have you seen or consulted any other health care providers outside of [facility] since your last visit?”    NO    Click Here for Release of Records Request     No results found for this visit on 25.   Vitals:    25 1504   BP: 125/87   Pulse: 98   Resp: 17   Temp: 97.6 °F (36.4 °C)   TempSrc: Temporal   SpO2: 97%   Weight: 85.3 kg (188 lb)           The patient, Екатерина Mckee, identity was verified by name and .

## 2025-07-01 ENCOUNTER — TELEPHONE (OUTPATIENT)
Age: 40
End: 2025-07-01

## 2025-08-25 ENCOUNTER — OFFICE VISIT (OUTPATIENT)
Age: 40
End: 2025-08-25
Payer: COMMERCIAL

## 2025-08-25 VITALS
TEMPERATURE: 97.2 F | SYSTOLIC BLOOD PRESSURE: 130 MMHG | DIASTOLIC BLOOD PRESSURE: 70 MMHG | OXYGEN SATURATION: 98 % | RESPIRATION RATE: 18 BRPM | HEART RATE: 76 BPM | WEIGHT: 190 LBS | BODY MASS INDEX: 28.89 KG/M2

## 2025-08-25 DIAGNOSIS — R41.840 POOR CONCENTRATION: ICD-10-CM

## 2025-08-25 DIAGNOSIS — R53.83 LOW ENERGY: ICD-10-CM

## 2025-08-25 DIAGNOSIS — E66.811 OBESITY (BMI 30.0-34.9): ICD-10-CM

## 2025-08-25 DIAGNOSIS — R53.83 OTHER FATIGUE: ICD-10-CM

## 2025-08-25 DIAGNOSIS — F90.2 ADHD (ATTENTION DEFICIT HYPERACTIVITY DISORDER), COMBINED TYPE: Primary | ICD-10-CM

## 2025-08-25 DIAGNOSIS — R53.82 CHRONIC FATIGUE: ICD-10-CM

## 2025-08-25 PROCEDURE — 99214 OFFICE O/P EST MOD 30 MIN: CPT | Performed by: FAMILY MEDICINE

## 2025-08-25 PROCEDURE — 3075F SYST BP GE 130 - 139MM HG: CPT | Performed by: FAMILY MEDICINE

## 2025-08-25 PROCEDURE — 3078F DIAST BP <80 MM HG: CPT | Performed by: FAMILY MEDICINE

## 2025-08-25 RX ORDER — PHENTERMINE HYDROCHLORIDE 37.5 MG/1
37.5 TABLET ORAL
Qty: 30 TABLET | Refills: 0 | Status: SHIPPED | OUTPATIENT
Start: 2025-08-25 | End: 2025-09-24

## 2025-08-25 ASSESSMENT — PATIENT HEALTH QUESTIONNAIRE - PHQ9
1. LITTLE INTEREST OR PLEASURE IN DOING THINGS: NOT AT ALL
2. FEELING DOWN, DEPRESSED OR HOPELESS: SEVERAL DAYS
SUM OF ALL RESPONSES TO PHQ QUESTIONS 1-9: 1

## (undated) DEVICE — ROYALSILK SURGICAL GOWN, L: Brand: CONVERTORS

## (undated) DEVICE — STAPLER SKIN SQ 30 ABSRB STPL DISP INSORB

## (undated) DEVICE — KENDALL SCD EXPRESS SLEEVES, KNEE LENGTH, MEDIUM: Brand: KENDALL SCD

## (undated) DEVICE — MEDI-VAC NON-CONDUCTIVE SUCTION TUBING: Brand: CARDINAL HEALTH

## (undated) DEVICE — DEVON™ KNEE AND BODY STRAP 60" X 3" (1.5 M X 7.6 CM): Brand: DEVON

## (undated) DEVICE — (D)PREP SKN CHLRAPRP APPL 26ML -- CONVERT TO ITEM 371833

## (undated) DEVICE — STERILE POLYISOPRENE POWDER-FREE SURGICAL GLOVES: Brand: PROTEXIS

## (undated) DEVICE — SOLUTION IV 1000ML 0.9% SOD CHL

## (undated) DEVICE — CATH FOLEY 16F LUBRI-SIL IC --

## (undated) DEVICE — SOLUTION IRRIG 1000ML H2O STRL BLT

## (undated) DEVICE — LIGHT HANDLE: Brand: DEVON

## (undated) DEVICE — SUTURE VCRL SZ 0 L36IN ABSRB UD L40MM CT 1/2 CIR TAPERPOINT J958H

## (undated) DEVICE — PACK PROCEDURE SURG C SECT KT SMH

## (undated) DEVICE — SOLIDIFIER MEDC 1200ML -- CONVERT TO 356117

## (undated) DEVICE — COVERALL PREM SMS 2XL KNIT --

## (undated) DEVICE — 3000CC GUARDIAN II: Brand: GUARDIAN

## (undated) DEVICE — REM POLYHESIVE ADULT PATIENT RETURN ELECTRODE: Brand: VALLEYLAB

## (undated) DEVICE — DRAPE FLD WRM W44XL66IN C6L FOR INTRATEMP SYS THERMABASIN